# Patient Record
Sex: FEMALE | Race: WHITE | Employment: FULL TIME | URBAN - METROPOLITAN AREA
[De-identification: names, ages, dates, MRNs, and addresses within clinical notes are randomized per-mention and may not be internally consistent; named-entity substitution may affect disease eponyms.]

---

## 2019-03-21 ENCOUNTER — HOSPITAL ENCOUNTER (OUTPATIENT)
Age: 39
Setting detail: SPECIMEN
Discharge: HOME | End: 2019-03-21
Payer: COMMERCIAL

## 2019-03-21 LAB
ABSOLUTE EOS #: 0.41 K/UL (ref 0–0.44)
ABSOLUTE IMMATURE GRANULOCYTE: <0.03 K/UL (ref 0–0.3)
ABSOLUTE LYMPH #: 2.27 K/UL (ref 1.1–3.7)
ABSOLUTE MONO #: 0.56 K/UL (ref 0.1–1.2)
ALBUMIN SERPL-MCNC: 4.3 G/DL (ref 3.5–5.2)
ALBUMIN/GLOBULIN RATIO: 1.5 (ref 1–2.5)
ALP BLD-CCNC: 118 U/L (ref 35–104)
ALT SERPL-CCNC: 39 U/L (ref 5–33)
ANION GAP SERPL CALCULATED.3IONS-SCNC: 13 MMOL/L (ref 9–17)
AST SERPL-CCNC: 46 U/L
BASOPHILS # BLD: 1 % (ref 0–2)
BASOPHILS ABSOLUTE: 0.04 K/UL (ref 0–0.2)
BILIRUB SERPL-MCNC: 0.72 MG/DL (ref 0.3–1.2)
BUN BLDV-MCNC: 10 MG/DL (ref 6–20)
BUN/CREAT BLD: ABNORMAL (ref 9–20)
CALCIUM SERPL-MCNC: 9.3 MG/DL (ref 8.6–10.4)
CHLORIDE BLD-SCNC: 106 MMOL/L (ref 98–107)
CHOLESTEROL/HDL RATIO: 5
CHOLESTEROL: 164 MG/DL
CO2: 24 MMOL/L (ref 20–31)
CREAT SERPL-MCNC: 0.75 MG/DL (ref 0.5–0.9)
DIFFERENTIAL TYPE: ABNORMAL
EOSINOPHILS RELATIVE PERCENT: 6 % (ref 1–4)
FOLATE: 4.3 NG/ML
GFR AFRICAN AMERICAN: >60 ML/MIN
GFR NON-AFRICAN AMERICAN: >60 ML/MIN
GFR SERPL CREATININE-BSD FRML MDRD: ABNORMAL ML/MIN/{1.73_M2}
GFR SERPL CREATININE-BSD FRML MDRD: ABNORMAL ML/MIN/{1.73_M2}
GLUCOSE BLD-MCNC: 90 MG/DL (ref 70–99)
HCT VFR BLD CALC: 48.8 % (ref 36.3–47.1)
HDLC SERPL-MCNC: 33 MG/DL
HEMOGLOBIN: 15.9 G/DL (ref 11.9–15.1)
IMMATURE GRANULOCYTES: 0 %
LDL CHOLESTEROL: 92 MG/DL (ref 0–130)
LYMPHOCYTES # BLD: 34 % (ref 24–43)
MAGNESIUM: 2.3 MG/DL (ref 1.6–2.6)
MCH RBC QN AUTO: 28.9 PG (ref 25.2–33.5)
MCHC RBC AUTO-ENTMCNC: 32.6 G/DL (ref 28.4–34.8)
MCV RBC AUTO: 88.6 FL (ref 82.6–102.9)
MONOCYTES # BLD: 8 % (ref 3–12)
NRBC AUTOMATED: 0 PER 100 WBC
PDW BLD-RTO: 13.8 % (ref 11.8–14.4)
PLATELET # BLD: ABNORMAL K/UL (ref 138–453)
PLATELET ESTIMATE: ABNORMAL
PLATELET, FLUORESCENCE: 208 K/UL (ref 138–453)
PLATELET, IMMATURE FRACTION: 11.7 % (ref 1.1–10.3)
PMV BLD AUTO: ABNORMAL FL (ref 8.1–13.5)
POTASSIUM SERPL-SCNC: 4.6 MMOL/L (ref 3.7–5.3)
PREALBUMIN: 19 MG/DL (ref 20–40)
RBC # BLD: 5.51 M/UL (ref 3.95–5.11)
RBC # BLD: ABNORMAL 10*6/UL
SEG NEUTROPHILS: 51 % (ref 36–65)
SEGMENTED NEUTROPHILS ABSOLUTE COUNT: 3.4 K/UL (ref 1.5–8.1)
SODIUM BLD-SCNC: 143 MMOL/L (ref 135–144)
THYROXINE, FREE: 0.82 NG/DL (ref 0.93–1.7)
TOTAL PROTEIN: 7.2 G/DL (ref 6.4–8.3)
TRIGL SERPL-MCNC: 196 MG/DL
TSH SERPL DL<=0.05 MIU/L-ACNC: 15.13 MIU/L (ref 0.3–5)
VITAMIN B-12: 342 PG/ML (ref 232–1245)
VITAMIN D 25-HYDROXY: 10.5 NG/ML (ref 30–100)
VLDLC SERPL CALC-MCNC: ABNORMAL MG/DL (ref 1–30)
WBC # BLD: 6.7 K/UL (ref 3.5–11.3)
WBC # BLD: ABNORMAL 10*3/UL

## 2019-04-11 ENCOUNTER — HOSPITAL ENCOUNTER (OUTPATIENT)
Dept: ULTRASOUND IMAGING | Age: 39
Discharge: HOME | End: 2019-04-11
Payer: COMMERCIAL

## 2019-04-11 DIAGNOSIS — E03.9 HYPOTHYROIDISM, UNSPECIFIED TYPE: ICD-10-CM

## 2019-04-11 PROCEDURE — 76536 US EXAM OF HEAD AND NECK: CPT

## 2019-05-15 ENCOUNTER — TELEPHONE (OUTPATIENT)
Dept: BARIATRICS/WEIGHT MGMT | Age: 39
End: 2019-05-15

## 2019-05-15 NOTE — TELEPHONE ENCOUNTER
Pt has int visit with Dr. Iraj Dumont on 5/17/2019. She had gastric bypass surgery Nov. 15, 2018 In [place] Dr. Landon Mejía po visit was scheduled for [facility] [place]. 17. 2018- she had moved to [place] by then-no post op visits with bariatric doctor. She has been waiting to \"get insurance\" to have a visit with us. Is taking vitamins-will bring all meds to 5/1/19 visit.

## 2019-05-17 ENCOUNTER — OFFICE VISIT (OUTPATIENT)
Dept: BARIATRICS/WEIGHT MGMT | Age: 39
End: 2019-05-17
Payer: COMMERCIAL

## 2019-05-17 VITALS
SYSTOLIC BLOOD PRESSURE: 114 MMHG | HEIGHT: 63 IN | HEART RATE: 72 BPM | WEIGHT: 269.2 LBS | DIASTOLIC BLOOD PRESSURE: 70 MMHG | RESPIRATION RATE: 18 BRPM | TEMPERATURE: 98.2 F | BODY MASS INDEX: 47.7 KG/M2

## 2019-05-17 DIAGNOSIS — F41.9 ANXIETY: ICD-10-CM

## 2019-05-17 DIAGNOSIS — E66.01 MORBID OBESITY (HCC): Primary | ICD-10-CM

## 2019-05-17 DIAGNOSIS — E03.9 HYPOTHYROIDISM, UNSPECIFIED TYPE: ICD-10-CM

## 2019-05-17 DIAGNOSIS — K21.9 GASTROESOPHAGEAL REFLUX DISEASE, ESOPHAGITIS PRESENCE NOT SPECIFIED: ICD-10-CM

## 2019-05-17 DIAGNOSIS — Z98.84 STATUS POST GASTRIC BYPASS FOR OBESITY: ICD-10-CM

## 2019-05-17 DIAGNOSIS — Z13.21 SCREENING FOR MALNUTRITION: ICD-10-CM

## 2019-05-17 PROCEDURE — 99203 OFFICE O/P NEW LOW 30 MIN: CPT | Performed by: SURGERY

## 2019-05-17 PROCEDURE — 4004F PT TOBACCO SCREEN RCVD TLK: CPT | Performed by: SURGERY

## 2019-05-17 PROCEDURE — G8419 CALC BMI OUT NRM PARAM NOF/U: HCPCS | Performed by: SURGERY

## 2019-05-17 PROCEDURE — G8427 DOCREV CUR MEDS BY ELIG CLIN: HCPCS | Performed by: SURGERY

## 2019-05-17 RX ORDER — M-VIT,TX,IRON,MINS/CALC/FOLIC 27MG-0.4MG
1 TABLET ORAL DAILY
COMMUNITY
End: 2019-08-14 | Stop reason: ALTCHOICE

## 2019-05-17 RX ORDER — LEVOTHYROXINE SODIUM 0.15 MG/1
1 TABLET ORAL DAILY
Refills: 0 | COMMUNITY
Start: 2019-04-06 | End: 2019-08-14 | Stop reason: ALTCHOICE

## 2019-05-17 RX ORDER — ESTRADIOL 2 MG/1
1 TABLET ORAL DAILY
Refills: 6 | COMMUNITY
Start: 2019-04-06

## 2019-05-17 RX ORDER — IBUPROFEN 200 MG
1 CAPSULE ORAL DAILY
Status: ON HOLD | COMMUNITY
End: 2019-09-27 | Stop reason: HOSPADM

## 2019-05-17 RX ORDER — SPIRONOLACTONE 50 MG/1
1 TABLET, FILM COATED ORAL PRN
Refills: 6 | COMMUNITY
Start: 2019-04-08

## 2019-05-17 RX ORDER — KETOTIFEN FUMARATE 0.25 MG/ML
SOLUTION OPHTHALMIC PRN
Refills: 6 | Status: ON HOLD | COMMUNITY
Start: 2019-05-09 | End: 2020-10-31

## 2019-05-17 RX ORDER — POLYVINYL ALCOHOL 14 MG/ML
SOLUTION/ DROPS OPHTHALMIC PRN
Refills: 3 | Status: ON HOLD | COMMUNITY
Start: 2019-05-11 | End: 2020-10-31 | Stop reason: ALTCHOICE

## 2019-05-17 RX ORDER — FAMOTIDINE 20 MG/1
1 TABLET, FILM COATED ORAL DAILY
Refills: 6 | COMMUNITY
Start: 2019-04-06 | End: 2019-08-14 | Stop reason: ALTCHOICE

## 2019-05-17 RX ORDER — FUROSEMIDE 40 MG/1
1 TABLET ORAL PRN
Refills: 6 | COMMUNITY
Start: 2019-04-06

## 2019-05-17 RX ORDER — CHOLECALCIFEROL (VITAMIN D3) 125 MCG
1 CAPSULE ORAL DAILY
Refills: 6 | COMMUNITY
Start: 2019-04-06

## 2019-05-17 ASSESSMENT — ENCOUNTER SYMPTOMS
EYE PAIN: 0
CHEST TIGHTNESS: 0
SHORTNESS OF BREATH: 0
WHEEZING: 0
RECTAL PAIN: 0
ALLERGIC/IMMUNOLOGIC NEGATIVE: 1
RHINORRHEA: 0
COLOR CHANGE: 0
VOMITING: 0
DIARRHEA: 0
ABDOMINAL PAIN: 0
CHOKING: 0
FACIAL SWELLING: 0
STRIDOR: 0
BLOOD IN STOOL: 0
TROUBLE SWALLOWING: 0
COUGH: 0
CONSTIPATION: 0
ABDOMINAL DISTENTION: 0
SINUS PRESSURE: 0
EYE ITCHING: 1
EYE REDNESS: 0
NAUSEA: 0
SORE THROAT: 0
EYE DISCHARGE: 0
ANAL BLEEDING: 0
APNEA: 0
BACK PAIN: 0
PHOTOPHOBIA: 0
VOICE CHANGE: 0

## 2019-05-18 NOTE — PROGRESS NOTES
Subjective:      Patient ID: Moises Pepe is a 45 y.o. female. Chief Complaint   Patient presents with    Bariatric, Initial Visit     New patient ; previous bariatric surg in [place] ; also has thyroid nodule      HPI  Sandhya Mckeon is a 51-year-old female who presents for initial evaluation at the weight management program secondary to her morbid obesity. BMI 47. She underwent a laparoscopic Adam-en-Y gastric bypass in November 2018 in [place]. She then had to move to [place] in December. She states she never had a follow-up after surgery. She states starting her weight loss 30 she was 422 pounds. At surgery time she was 389 pounds. Currently 269 pounds. She admits currently taking a Flintstones vitamin, vitamin B-12 and a multivitamin daily. She is taking calcium twice a day. She brought her medications with her. She states she has always had problems with her thyroid. She has had hypothyroidism for a long time and has known multinodular goiter. She has symptoms with heaviness and pressure on her neck because of it. Notices the thyroid enlargement in the mere and with swallowing. Currently taking Synthroid. Had been discussing with physicians in [place] about radioactive iodine treatment versus surgical intervention with total thyroidectomy. She states her overall weight loss goal is to weigh around 150 pounds. She denies having any issues with food intake other than a eggs and bread. She has been tolerating most foods and staying well-hydrated. Admits she would like to become more active and join a gym. She states she used to smoke 2 packs per day but is now down to half a pack a day. Occasionally does state being also. Denies current chest or abdominal pain. No hematochezia or melena. No new urinary complaints. She denies having any kind of laboratory work done since surgery. Review of Systems   Constitutional: Positive for appetite change and chills.  Negative for activity change, diaphoresis, fatigue, fever and unexpected weight change. HENT: Negative for congestion, dental problem, drooling, ear discharge, ear pain, facial swelling, hearing loss, mouth sores, nosebleeds, postnasal drip, rhinorrhea, sinus pressure, sneezing, sore throat, tinnitus, trouble swallowing and voice change. Eyes: Positive for itching. Negative for photophobia, pain, discharge, redness and visual disturbance. Respiratory: Negative for apnea, cough, choking, chest tightness, shortness of breath, wheezing and stridor. Cardiovascular: Positive for leg swelling. Negative for chest pain and palpitations. Gastrointestinal: Negative for abdominal distention, abdominal pain, anal bleeding, blood in stool, constipation, diarrhea, nausea, rectal pain and vomiting. Endocrine: Positive for heat intolerance. Negative for cold intolerance, polydipsia, polyphagia and polyuria. Genitourinary: Negative for decreased urine volume, difficulty urinating, dyspareunia, dysuria, enuresis, flank pain, frequency, genital sores, hematuria, menstrual problem, pelvic pain, urgency, vaginal bleeding, vaginal discharge and vaginal pain. Musculoskeletal: Negative for arthralgias, back pain, gait problem, joint swelling, myalgias, neck pain and neck stiffness. Skin: Negative for color change, pallor, rash and wound. Allergic/Immunologic: Negative. Neurological: Negative for dizziness, tremors, seizures, syncope, facial asymmetry, speech difficulty, weakness, light-headedness, numbness and headaches. Hematological: Negative for adenopathy. Does not bruise/bleed easily. Psychiatric/Behavioral: Negative for agitation, behavioral problems, confusion, decreased concentration, dysphoric mood, hallucinations, self-injury, sleep disturbance and suicidal ideas. The patient is nervous/anxious and is hyperactive.         Past Medical History:   Diagnosis Date    Anxiety     GERD (gastroesophageal reflux disease)     Hypothyroidism     Thyroid nodule        Past Surgical History:   Procedure Laterality Date    BARIATRIC SURGERY       SECTION      CHOLECYSTECTOMY      HYSTERECTOMY      TUBAL LIGATION         Current Outpatient Medications   Medication Sig Dispense Refill    vitamin B-12 (CYANOCOBALAMIN) 500 MCG tablet Take 1 tablet by mouth daily  6    levothyroxine (SYNTHROID) 150 MCG tablet Take 1 tablet by mouth daily  0    famotidine (PEPCID) 20 MG tablet Take 1 tablet by mouth daily  6    spironolactone (ALDACTONE) 50 MG tablet Take 1 tablet by mouth daily  6    EQ EYE ITCH RELIEF 0.025 % ophthalmic solution as needed  6    furosemide (LASIX) 40 MG tablet Take 1 tablet by mouth daily  6    ARTIFICIAL TEARS 1.4 % ophthalmic solution as needed  3    estradiol (ESTRACE) 2 MG tablet Take 1 tablet by mouth daily  6    calcium citrate (CALCITRATE) 950 MG tablet Take 1 tablet by mouth daily      Multiple Vitamins-Minerals (THERAPEUTIC MULTIVITAMIN-MINERALS) tablet Take 1 tablet by mouth daily Indications: centrium      Multiple Vitamin (MULTI VITAMIN DAILY PO) Take by mouth Indications: flinstones      White Petrolatum-Mineral Oil ([employer] EYE LUBRICANT NIGHTTIME) OINT Apply to eye       No current facility-administered medications for this visit.         Allergies   Allergen Reactions    Latex Itching and Rash       Family History   Problem Relation Age of Onset    Other Mother         gallbladder disease    Diabetes Father     Hypertension Father     Arthritis Father     Diabetes Paternal Grandfather     Cancer Paternal Grandmother     Diabetes Maternal Grandmother     Obesity Maternal Grandmother     Diabetes Maternal Grandfather        Social History     Socioeconomic History    Marital status:      Spouse name: Not on file    Number of children: Not on file    Years of education: Not on file    Highest education level: Not on file   Occupational History    Not on file   Social Needs  Financial resource strain: Not on file   Rj-Haja insecurity:     Worry: Not on file     Inability: Not on file    Transportation needs:     Medical: Not on file     Non-medical: Not on file   Tobacco Use    Smoking status: Current Every Day Smoker     Packs/day: 0.50    Smokeless tobacco: Never Used   Substance and Sexual Activity    Alcohol use: Yes     Comment: social holidays     Drug use: Never    Sexual activity: Not on file   Lifestyle    Physical activity:     Days per week: Not on file     Minutes per session: Not on file    Stress: Not on file   Relationships    Social connections:     Talks on phone: Not on file     Gets together: Not on file     Attends [employer] service: Not on file     Active member of club or organization: Not on file     Attends meetings of clubs or organizations: Not on file     Relationship status: Not on file    Intimate partner violence:     Fear of current or ex partner: Not on file     Emotionally abused: Not on file     Physically abused: Not on file     Forced sexual activity: Not on file   Other Topics Concern    Not on file   Social History Narrative    Not on file     Vitals:    05/17/19 1215   BP: 114/70   Pulse: 72   Resp: 18   Temp: 98.2 °F (36.8 °C)     Body mass index is 47.69 kg/m². Weight: 269 lb 3.2 oz (122.1 kg)     Objective:   Physical Exam   Constitutional: She is oriented to person, place, and time. She appears well-developed and well-nourished. No distress. HENT:   Head: Normocephalic and atraumatic. Right Ear: External ear normal.   Left Ear: External ear normal.   Nose: Nose normal.   Mouth/Throat: Oropharynx is clear and moist.   Eyes: Conjunctivae and EOM are normal. Right eye exhibits no discharge. Left eye exhibits no discharge. No scleral icterus. Neck: Normal range of motion. Neck supple. Cardiovascular: Normal rate, regular rhythm, normal heart sounds and intact distal pulses.    Pulmonary/Chest: Effort normal and breath fitness/exercise discussed with patient - encouraged fitness Center. 6.  Encourage smoking cessation. Discussed with patient possible risks and side effects of smoking with recent surgery. 7.  Follow-up in one month at weight management program at [address] [place] [address]. 8.  Signs and symptoms reviewed with patient that would be concerning and need her to return to office for re-evaluation. Patient states she will call if she has questions or concerns. 9.  Follow-up with general surgery office in regards to further discussions and care for thyroid disease. Recommended endocrinology consultation/evaluation as well. 10.  Psychology evaluation as needed  11. EGD as needed  12. Encouraged support groups  13. Discussed the pros and cons along with possible side effects/complications of weight loss medications. All questions answered. More than 30 minutes spent with patient today. Greater than 50% of the time was involved counseling, educaton and coordinating care.         Lynna Claude, MD

## 2019-05-22 ENCOUNTER — TELEPHONE (OUTPATIENT)
Dept: BARIATRICS/WEIGHT MGMT | Age: 39
End: 2019-05-22

## 2019-05-22 ENCOUNTER — HOSPITAL ENCOUNTER (OUTPATIENT)
Age: 39
Discharge: HOME | End: 2019-05-22
Payer: COMMERCIAL

## 2019-05-22 DIAGNOSIS — Z98.84 STATUS POST GASTRIC BYPASS FOR OBESITY: ICD-10-CM

## 2019-05-22 DIAGNOSIS — Z13.21 SCREENING FOR MALNUTRITION: ICD-10-CM

## 2019-05-22 LAB
ALBUMIN SERPL-MCNC: 4 G/DL (ref 3.5–5.1)
ALP BLD-CCNC: 134 U/L (ref 38–126)
ALT SERPL-CCNC: 19 U/L (ref 11–66)
ANION GAP SERPL CALCULATED.3IONS-SCNC: 16 MEQ/L (ref 8–16)
AST SERPL-CCNC: 26 U/L (ref 5–40)
AVERAGE GLUCOSE: 93 MG/DL (ref 70–126)
BASOPHILS # BLD: 0.5 %
BASOPHILS ABSOLUTE: 0 THOU/MM3 (ref 0–0.1)
BILIRUB SERPL-MCNC: 0.6 MG/DL (ref 0.3–1.2)
BUN BLDV-MCNC: 15 MG/DL (ref 7–22)
CALCIUM SERPL-MCNC: 9.6 MG/DL (ref 8.5–10.5)
CHLORIDE BLD-SCNC: 104 MEQ/L (ref 98–111)
CO2: 22 MEQ/L (ref 23–33)
CREAT SERPL-MCNC: 0.8 MG/DL (ref 0.4–1.2)
EOSINOPHIL # BLD: 5.5 %
EOSINOPHILS ABSOLUTE: 0.4 THOU/MM3 (ref 0–0.4)
ERYTHROCYTE [DISTWIDTH] IN BLOOD BY AUTOMATED COUNT: 13.1 % (ref 11.5–14.5)
ERYTHROCYTE [DISTWIDTH] IN BLOOD BY AUTOMATED COUNT: 42.6 FL (ref 35–45)
FERRITIN: 110 NG/ML (ref 10–291)
FOLATE: 5.7 NG/ML (ref 4.8–24.2)
GFR SERPL CREATININE-BSD FRML MDRD: 80 ML/MIN/1.73M2
GLUCOSE BLD-MCNC: 88 MG/DL (ref 70–108)
HBA1C MFR BLD: 5.1 % (ref 4.4–6.4)
HCT VFR BLD CALC: 48.9 % (ref 37–47)
HEMOGLOBIN: 16.1 GM/DL (ref 12–16)
IMMATURE GRANS (ABS): 0.02 THOU/MM3 (ref 0–0.07)
IMMATURE GRANULOCYTES: 0.3 %
IRON: 109 UG/DL (ref 50–170)
LYMPHOCYTES # BLD: 39.3 %
LYMPHOCYTES ABSOLUTE: 3.1 THOU/MM3 (ref 1–4.8)
MCH RBC QN AUTO: 29.3 PG (ref 26–33)
MCHC RBC AUTO-ENTMCNC: 32.9 GM/DL (ref 32.2–35.5)
MCV RBC AUTO: 89.1 FL (ref 81–99)
MONOCYTES # BLD: 7.7 %
MONOCYTES ABSOLUTE: 0.6 THOU/MM3 (ref 0.4–1.3)
NUCLEATED RED BLOOD CELLS: 0 /100 WBC
PLATELET # BLD: 263 THOU/MM3 (ref 130–400)
PMV BLD AUTO: 12 FL (ref 9.4–12.4)
POTASSIUM SERPL-SCNC: 4.3 MEQ/L (ref 3.5–5.2)
PREALBUMIN: 17.2 MG/DL (ref 20–40)
PTH INTACT: 57.9 PG/ML (ref 15–65)
RBC # BLD: 5.49 MILL/MM3 (ref 4.2–5.4)
SEG NEUTROPHILS: 46.7 %
SEGMENTED NEUTROPHILS ABSOLUTE COUNT: 3.6 THOU/MM3 (ref 1.8–7.7)
SODIUM BLD-SCNC: 142 MEQ/L (ref 135–145)
T4 FREE: 0.98 NG/DL (ref 0.93–1.76)
TOTAL IRON BINDING CAPACITY: 334 UG/DL (ref 171–450)
TOTAL PROTEIN: 7.9 G/DL (ref 6.1–8)
TSH SERPL DL<=0.05 MIU/L-ACNC: 14.28 UIU/ML (ref 0.4–4.2)
VITAMIN B-12: 366 PG/ML (ref 211–911)
VITAMIN D 25-HYDROXY: 12 NG/ML (ref 30–100)
WBC # BLD: 7.8 THOU/MM3 (ref 4.8–10.8)

## 2019-05-22 PROCEDURE — 84255 ASSAY OF SELENIUM: CPT

## 2019-05-22 PROCEDURE — 84630 ASSAY OF ZINC: CPT

## 2019-05-22 PROCEDURE — 80053 COMPREHEN METABOLIC PANEL: CPT

## 2019-05-22 PROCEDURE — 83540 ASSAY OF IRON: CPT

## 2019-05-22 PROCEDURE — 83036 HEMOGLOBIN GLYCOSYLATED A1C: CPT

## 2019-05-22 PROCEDURE — 36415 COLL VENOUS BLD VENIPUNCTURE: CPT

## 2019-05-22 PROCEDURE — 84439 ASSAY OF FREE THYROXINE: CPT

## 2019-05-22 PROCEDURE — 85025 COMPLETE CBC W/AUTO DIFF WBC: CPT

## 2019-05-22 PROCEDURE — 84134 ASSAY OF PREALBUMIN: CPT

## 2019-05-22 PROCEDURE — 84443 ASSAY THYROID STIM HORMONE: CPT

## 2019-05-22 PROCEDURE — 82728 ASSAY OF FERRITIN: CPT

## 2019-05-22 PROCEDURE — 83970 ASSAY OF PARATHORMONE: CPT

## 2019-05-22 PROCEDURE — 84590 ASSAY OF VITAMIN A: CPT

## 2019-05-22 PROCEDURE — 84425 ASSAY OF VITAMIN B-1: CPT

## 2019-05-22 PROCEDURE — 82306 VITAMIN D 25 HYDROXY: CPT

## 2019-05-22 PROCEDURE — 82607 VITAMIN B-12: CPT

## 2019-05-22 PROCEDURE — 83550 IRON BINDING TEST: CPT

## 2019-05-22 PROCEDURE — 82525 ASSAY OF COPPER: CPT

## 2019-05-22 PROCEDURE — 82746 ASSAY OF FOLIC ACID SERUM: CPT

## 2019-05-23 ENCOUNTER — OFFICE VISIT (OUTPATIENT)
Dept: BARIATRICS/WEIGHT MGMT | Age: 39
End: 2019-05-23

## 2019-05-23 DIAGNOSIS — Z98.84 STATUS POST BARIATRIC SURGERY: Primary | ICD-10-CM

## 2019-05-23 DIAGNOSIS — E55.9 VITAMIN D DEFICIENCY: Primary | ICD-10-CM

## 2019-05-23 RX ORDER — CHOLECALCIFEROL (VITAMIN D3) 1250 MCG
1 CAPSULE ORAL WEEKLY
Qty: 12 CAPSULE | Refills: 0 | Status: ON HOLD | OUTPATIENT
Start: 2019-05-23 | End: 2019-09-27 | Stop reason: HOSPADM

## 2019-05-23 NOTE — PROGRESS NOTES
Patient is a 45 y.o. female seen for initial MNT visit for six month post op bypass. Pt requesting RD visit as no follow up care since gastric bypass six months ago. Vitals from current and previous visits:  Vitals 6/67/9995   SYSTOLIC 005   DIASTOLIC 70   Site Right Lower Arm   Position Sitting   Cuff Size Large Adult   Pulse 72   Temp 98.2   Resp 18   Weight 269 lb 3.2 oz   Height 5' 3\"   BMI (wt*703/ht~2) 47.68 kg/m2        Recent Post Op Lab Work:    Some lab work pending that was done yesterday. Await copper, selenium, zinc, and B1 lab levels to return. Lab Results   Component Value Date    VITD25 12 05/22/2019       Date of Surgery: November 2018   Type of Surgery: gastric bypass in [place], [place]. Started with program June 2018 weight was 422 lbs. States after high school weight was ~ 189 lbs     Obesity Classification: Class III    Initial Weight: 389 lbs at pre-op teaching class   Excess  Body Weight Pre-Op: 248  lbs    Weight Loss: 120 lbs. Patient's Target Weight =  141 lbs for a BMI of ~25  Percentage of Excess Body Weight Lost to date is : 48 %    How pleased are you with your current weight loss: Pt is pleased with weight loss    Are you experiencing any physical problems post surgery: No: - States bowels move ok    Are you experiencing any dietary problems post surgery: Yes- heart burn with tomato products. Reports will get n/v if eats too fast- this happens less than once a week  Food Intolerances: Is not tolerating  eggs and bread that isn't toasted, hotdogs. How frequently are you eating? Has difficulty eating breakfast, will eat lunch and dinner. May snack on a cracker in the morning. In afternoon may have some popcorn  How long does it take you to finish a meal? Pt taking time to eat  How full do you feel after eating?  Pt feels full after eats      Protein intake: <60 grams/day without regular protein supplementation or drinking milk  Patient taking protein supplement: No.  Brand of Supplement:     Fluid intake: Drinks Unsweet Tea and 24 oz water four times a day. Multivitamin/mineral intake: Two Centrum  Adult MVI Tablets a day = 2,000IU's D3, 3 mg Thiamin, 36 mg iron, zinc 22 mg, copper- 1.0 mg, selenium - 110 mcg  Also taking One Lyman Chewable Complete- 1.5 mg Thiamin, Vitamin D-600 IU, Zinc- 12 mg, Copper- 2mg    Calcium intake: Yes. Citracal two per day = 630 mg /day and 500 IU's Vitamin D3. Advised pt to increase the Citracal to two tablets twice a day = 1260 mg Calcium per day to meet ASMBS guidelines. Other: B12- 500 micrograms daily  Pepcid 20 mg once a day    Does patient exercise: no exercise  Is planning to join [employer]. Assessment  Suspect inadequate protein intake without routine protein supplementation and skipping breakfast.  Protein goal is 60-80 grams daily. Taking vitamins to prevent deficiencies. Adequate water intake. Plan  Plan/Recommendations: Will have PA order Vitamin D3 50,000IU's weekly times twelve weeks. Increase Calcium to two tablets twice a day. Continue current MVI's and B12. Will adjust these vitamin recommendations if needed pending rest of lab work results  Educational handout reviewed with patient re: protein source foods and grams. Additional instructions below. 1. Continue bariatric vitamins as you are currently taking. Can increase your CitraCal to two tablets twice a day. 2. Goal continues to be 60-80 grams protein per day. Focus on choosing protein foods first at meals to remain full and maintain muscle mass while you continue to lose from body fat stores. 3. Regular physical activity is important if desire to continue weight loss efforts. Recommend regular cardiac activity and strength training 2-3 days per week. 4.  Water goal is 64 oz per day and make sure no liquids 30 minutes before meals and no liquids 30 minutes after meals  5.   Take 20-30 minutes to eat meals and chew all foods well for best tolerance    Follow up with your family doctor about your Thyroid level and medication. Your TSH level was 14.2  T4-0.98      Recommended Follow-Up: 9 month post op with RD and $25 co-pay. Order one year post gastric bypass lab work at 10 month post op visit.     Brandt Villanueva RD, LD  Dietitian- [facility]

## 2019-05-23 NOTE — PATIENT INSTRUCTIONS
1. Continue bariatric vitamins as you are currently taking. Can increase your CitraCal to two tablets twice a day. 2. Goal continues to be 60-80 grams protein per day. Focus on choosing protein foods first at meals to remain full and maintain muscle mass while you continue to lose from body fat stores. 3. Regular physical activity is important if desire to continue weight loss efforts. Recommend regular cardiac activity and strength training 2-3 days per week. 4.  Water goal is 64 oz per day and make sure no liquids 30 minutes before meals and no liquids 30 minutes after meals  5. Take 20-30 minutes to eat meals and chew all foods well for best tolerance    Follow up with your family doctor about your Thyroid level and medication.   Your TSH level was 14.2  T4-0.98

## 2019-05-25 LAB
RETINOL (VITAMIN A): NORMAL
SELENIUM: 130 UG/L (ref 23–190)
VITAMIN B1 WHOLE BLOOD: 90 NMOL/L (ref 70–180)
ZINC: 69.4 UG/DL (ref 60–120)

## 2019-05-26 LAB — COPPER: 193.1 UG/DL (ref 80–155)

## 2019-05-27 ENCOUNTER — HOSPITAL ENCOUNTER (EMERGENCY)
Age: 39
Discharge: HOME | End: 2019-05-27
Attending: FAMILY MEDICINE
Payer: COMMERCIAL

## 2019-05-27 ENCOUNTER — APPOINTMENT (OUTPATIENT)
Dept: GENERAL RADIOLOGY | Age: 39
End: 2019-05-27
Payer: COMMERCIAL

## 2019-05-27 VITALS
DIASTOLIC BLOOD PRESSURE: 86 MMHG | RESPIRATION RATE: 18 BRPM | OXYGEN SATURATION: 97 % | BODY MASS INDEX: 46.95 KG/M2 | HEART RATE: 84 BPM | HEIGHT: 63 IN | SYSTOLIC BLOOD PRESSURE: 121 MMHG | WEIGHT: 265 LBS | TEMPERATURE: 97.6 F

## 2019-05-27 DIAGNOSIS — K59.00 CONSTIPATION, UNSPECIFIED CONSTIPATION TYPE: Primary | ICD-10-CM

## 2019-05-27 PROCEDURE — 99283 EMERGENCY DEPT VISIT LOW MDM: CPT

## 2019-05-27 PROCEDURE — 74018 RADEX ABDOMEN 1 VIEW: CPT

## 2019-05-27 PROCEDURE — 2709999900 HC NON-CHARGEABLE SUPPLY

## 2019-05-27 RX ORDER — POLYETHYLENE GLYCOL 3350 17 G/17G
17 POWDER, FOR SOLUTION ORAL DAILY
Qty: 1 BOTTLE | Refills: 0 | Status: SHIPPED | OUTPATIENT
Start: 2019-05-27 | End: 2019-06-03

## 2019-05-27 ASSESSMENT — ENCOUNTER SYMPTOMS
DIARRHEA: 0
SHORTNESS OF BREATH: 0
EYE PAIN: 0
ABDOMINAL PAIN: 1
EYE DISCHARGE: 0
BACK PAIN: 0
CONSTIPATION: 1
RHINORRHEA: 0
VOMITING: 0
SORE THROAT: 0
WHEEZING: 0
COUGH: 0
NAUSEA: 0

## 2019-05-27 ASSESSMENT — PAIN DESCRIPTION - ORIENTATION: ORIENTATION: LOWER

## 2019-05-27 ASSESSMENT — PAIN DESCRIPTION - LOCATION: LOCATION: ABDOMEN

## 2019-05-27 ASSESSMENT — PAIN DESCRIPTION - DESCRIPTORS: DESCRIPTORS: TIGHTNESS

## 2019-05-27 ASSESSMENT — PAIN DESCRIPTION - PAIN TYPE: TYPE: CHRONIC PAIN

## 2019-05-27 ASSESSMENT — PAIN SCALES - GENERAL: PAINLEVEL_OUTOF10: 8

## 2019-05-28 NOTE — ED PROVIDER NOTES
[facility]  eMERGENCY dEPARTMENT eNCOUnter          CHIEF COMPLAINT       Chief Complaint   Patient presents with    Constipation    Fecal Impaction       Nurses Notes reviewed and I agree except as noted in the HPI. HISTORY OF PRESENT ILLNESS    Raúl Pacheco is a 45 y.o. female who presents to the Emergency Department for the evaluation of constipation. The patient reports that she has not had a normal bowel movement in about 1 month. She states she woke up this morning with abdominal pain due to the constipation. Patient tried taking a stool softener and laxatives today, but these provided no relief. The patient explains that she is able to feel stool in the rectum but is not able to push it out. Patient reports having bariatric surgery in November and states that it may be her diet since the surgery that is causing the constipation. There are no other complaints at this time. She has no vomiting. No abdominal distention. No melena or hematochezia. No hematemesis. She denies any  symptoms at this time. She has no cardiac respiratory system symptoms. No headache neck pain or back pain. No extremity or musculoskeletal issues. No fevers or any other constitutional symptoms. The HPI was provided by the patient. REVIEW OF SYSTEMS     Review of Systems   Constitutional: Negative for appetite change, chills, fatigue and fever. HENT: Negative for congestion, ear pain, rhinorrhea and sore throat. Eyes: Negative for pain, discharge and visual disturbance. Respiratory: Negative for cough, shortness of breath and wheezing. Cardiovascular: Negative for chest pain, palpitations and leg swelling. Gastrointestinal: Positive for abdominal pain and constipation. Negative for diarrhea, nausea and vomiting. Genitourinary: Negative for difficulty urinating, dysuria, hematuria and vaginal discharge.    Musculoskeletal: Negative for arthralgias, back pain, joint swelling indicated that her maternal grandfather is . She indicated that her paternal grandmother is alive. She indicated that her paternal grandfather is . family history includes Arthritis in her father; Cancer in her paternal grandmother; Diabetes in her father, maternal grandfather, maternal grandmother, and paternal grandfather; Hypertension in her father; Obesity in her maternal grandmother; Other in her mother. SOCIAL HISTORY      reports that she has been smoking. She has been smoking about 0.50 packs per day. She has never used smokeless tobacco. She reports that she drinks alcohol. She reports that she does not use drugs. PHYSICAL EXAM     INITIAL VITALS:  height is 5' 3\" (1.6 m) and weight is 265 lb (120.2 kg). Her oral temperature is 97.6 °F (36.4 °C). Her blood pressure is 121/86 and her pulse is 84. Her respiration is 18 and oxygen saturation is 97%. Physical Exam   Constitutional: She is oriented to person, place, and time. She appears well-developed and well-nourished. Non-toxic appearance. HENT:   Head: Normocephalic and atraumatic. Right Ear: Tympanic membrane and external ear normal.   Left Ear: Tympanic membrane and external ear normal.   Nose: Nose normal.   Mouth/Throat: Oropharynx is clear and moist and mucous membranes are normal. No oropharyngeal exudate, posterior oropharyngeal edema or posterior oropharyngeal erythema. Eyes: Conjunctivae and EOM are normal.   Neck: Normal range of motion. Neck supple. No JVD present. Cardiovascular: Normal rate, regular rhythm, normal heart sounds, intact distal pulses and normal pulses. Exam reveals no gallop and no friction rub. No murmur heard. Pulmonary/Chest: Effort normal and breath sounds normal. No respiratory distress. She has no decreased breath sounds. She has no wheezes. She has no rhonchi. She has no rales. Abdominal: Soft. Bowel sounds are normal. She exhibits no distension. There is no tenderness.  There is no rebound, no guarding and no CVA tenderness. Genitourinary:   Genitourinary Comments: Soft stool in the rectum. Musculoskeletal: Normal range of motion. She exhibits no edema. Neurological: She is alert and oriented to person, place, and time. She exhibits normal muscle tone. Coordination normal.   Skin: Skin is warm and dry. No rash noted. She is not diaphoretic. Nursing note and vitals reviewed. DIFFERENTIAL DIAGNOSIS:   constipation    DIAGNOSTIC RESULTS     EKG: All EKG's are interpreted by the Emergency Department Physician who either signs or Co-signs this chart in the absence of a cardiologist.  EKG interpreted by Heidi Valdez MD:    None     RADIOLOGY: non-plain film images(s) such as CT, Ultrasound and MRI are read by the radiologist.    XR ABDOMEN (KUB) (SINGLE AP VIEW)   Final Result      Non obstructive bowel gas pattern. No significant stool retention. **This report has been created using voice recognition software. It may contain minor errors which are inherent in voice recognition technology. **      Final report electronically signed by Dr. Jasmyne Sanford on 5/27/2019 9:21 PM           LABS:   Labs Reviewed - No data to display    EMERGENCY DEPARTMENT COURSE:   Vitals:    Vitals:    05/27/19 2008 05/27/19 2010   BP:  121/86   Pulse: 84    Resp: 18    Temp: 97.6 °F (36.4 °C)    TempSrc: Oral    SpO2: 97%    Weight: 265 lb (120.2 kg)    Height: 5' 3\" (1.6 m)          MDM:    Patient presents to the emergency department constipation KUB confirms severe constipation she is clinically well with a normal exam.  An enema  has been mildly to moderately successful. She can be discharged with MiraLAX for outpatient treatment. I asked her to come back to the ED if she is not any better in the next 48-72 hours of miralax. Or if she gets worse at any point she is agreeable to the plan and discharged. X-rays show no significant amounts of retention with normal bowel gas pattern.

## 2019-05-28 NOTE — ED TRIAGE NOTES
Pt to ED with c/o constipation and fecal impaction X 1 month. Pt stated she feels bowel in rectum but unable to push it out. Pt stated she has taken laxatives and stool softeners it has only moved small bowels.

## 2019-06-11 ENCOUNTER — HOSPITAL ENCOUNTER (OUTPATIENT)
Age: 39
Setting detail: SPECIMEN
Discharge: HOME | End: 2019-06-11
Payer: COMMERCIAL

## 2019-06-13 LAB — CYTOLOGY REPORT: NORMAL

## 2019-07-09 ENCOUNTER — TELEPHONE (OUTPATIENT)
Dept: SURGERY | Age: 39
End: 2019-07-09

## 2019-07-22 ENCOUNTER — HOSPITAL ENCOUNTER (EMERGENCY)
Age: 39
Discharge: HOME | End: 2019-07-22
Payer: COMMERCIAL

## 2019-07-22 VITALS
OXYGEN SATURATION: 97 % | SYSTOLIC BLOOD PRESSURE: 122 MMHG | BODY MASS INDEX: 45 KG/M2 | HEART RATE: 74 BPM | RESPIRATION RATE: 16 BRPM | HEIGHT: 63 IN | DIASTOLIC BLOOD PRESSURE: 74 MMHG | WEIGHT: 254 LBS | TEMPERATURE: 97.7 F

## 2019-07-22 DIAGNOSIS — K29.00 ACUTE GASTRITIS WITHOUT HEMORRHAGE, UNSPECIFIED GASTRITIS TYPE: Primary | ICD-10-CM

## 2019-07-22 LAB
ALBUMIN SERPL-MCNC: 4.1 G/DL (ref 3.5–5.1)
ALP BLD-CCNC: 107 U/L (ref 38–126)
ALT SERPL-CCNC: 14 U/L (ref 11–66)
AMPHETAMINE+METHAMPHETAMINE URINE SCREEN: NEGATIVE
ANION GAP SERPL CALCULATED.3IONS-SCNC: 14 MEQ/L (ref 8–16)
AST SERPL-CCNC: 19 U/L (ref 5–40)
BARBITURATE QUANTITATIVE URINE: NEGATIVE
BASOPHILS # BLD: 0.6 %
BASOPHILS ABSOLUTE: 0 THOU/MM3 (ref 0–0.1)
BENZODIAZEPINE QUANTITATIVE URINE: NEGATIVE
BILIRUB SERPL-MCNC: 0.5 MG/DL (ref 0.3–1.2)
BILIRUBIN DIRECT: < 0.2 MG/DL (ref 0–0.3)
BILIRUBIN URINE: ABNORMAL
BLOOD, URINE: NEGATIVE
BUN BLDV-MCNC: 16 MG/DL (ref 7–22)
CALCIUM SERPL-MCNC: 9.3 MG/DL (ref 8.5–10.5)
CANNABINOID QUANTITATIVE URINE: NEGATIVE
CHARACTER, URINE: CLEAR
CHLORIDE BLD-SCNC: 106 MEQ/L (ref 98–111)
CO2: 22 MEQ/L (ref 23–33)
COCAINE METABOLITE QUANTITATIVE URINE: NEGATIVE
COLOR: ABNORMAL
CREAT SERPL-MCNC: 0.7 MG/DL (ref 0.4–1.2)
EOSINOPHIL # BLD: 5.4 %
EOSINOPHILS ABSOLUTE: 0.4 THOU/MM3 (ref 0–0.4)
ERYTHROCYTE [DISTWIDTH] IN BLOOD BY AUTOMATED COUNT: 13.1 % (ref 11.5–14.5)
ERYTHROCYTE [DISTWIDTH] IN BLOOD BY AUTOMATED COUNT: 42.8 FL (ref 35–45)
GFR SERPL CREATININE-BSD FRML MDRD: > 90 ML/MIN/1.73M2
GLUCOSE BLD-MCNC: 94 MG/DL (ref 70–108)
GLUCOSE URINE: NEGATIVE MG/DL
HCT VFR BLD CALC: 46 % (ref 37–47)
HEMOGLOBIN: 15.1 GM/DL (ref 12–16)
ICTOTEST: NEGATIVE
IMMATURE GRANS (ABS): 0.02 THOU/MM3 (ref 0–0.07)
IMMATURE GRANULOCYTES: 0 %
KETONES, URINE: NEGATIVE
LEUKOCYTE ESTERASE, URINE: NEGATIVE
LIPASE: 27.5 U/L (ref 5.6–51.3)
LYMPHOCYTES # BLD: 45.7 %
LYMPHOCYTES ABSOLUTE: 3.1 THOU/MM3 (ref 1–4.8)
MAGNESIUM: 2.2 MG/DL (ref 1.6–2.4)
MCH RBC QN AUTO: 29.3 PG (ref 26–33)
MCHC RBC AUTO-ENTMCNC: 32.8 GM/DL (ref 32.2–35.5)
MCV RBC AUTO: 89.3 FL (ref 81–99)
MONOCYTES # BLD: 6.9 %
MONOCYTES ABSOLUTE: 0.5 THOU/MM3 (ref 0.4–1.3)
NITRITE, URINE: NEGATIVE
NUCLEATED RED BLOOD CELLS: 0 /100 WBC
OPIATES, URINE: NEGATIVE
OSMOLALITY CALCULATION: 284.1 MOSMOL/KG (ref 275–300)
OXYCODONE: NEGATIVE
PH UA: 5.5 (ref 5–9)
PHENCYCLIDINE QUANTITATIVE URINE: NEGATIVE
PLATELET # BLD: 212 THOU/MM3 (ref 130–400)
PMV BLD AUTO: 12 FL (ref 9.4–12.4)
POTASSIUM SERPL-SCNC: 4 MEQ/L (ref 3.5–5.2)
PROTEIN UA: NEGATIVE
RBC # BLD: 5.15 MILL/MM3 (ref 4.2–5.4)
SEG NEUTROPHILS: 41.1 %
SEGMENTED NEUTROPHILS ABSOLUTE COUNT: 2.8 THOU/MM3 (ref 1.8–7.7)
SODIUM BLD-SCNC: 142 MEQ/L (ref 135–145)
SPECIFIC GRAVITY, URINE: >= 1.03 (ref 1–1.03)
TOTAL PROTEIN: 7.5 G/DL (ref 6.1–8)
UROBILINOGEN, URINE: 1 EU/DL (ref 0–1)
WBC # BLD: 6.7 THOU/MM3 (ref 4.8–10.8)

## 2019-07-22 PROCEDURE — 83735 ASSAY OF MAGNESIUM: CPT

## 2019-07-22 PROCEDURE — 81003 URINALYSIS AUTO W/O SCOPE: CPT

## 2019-07-22 PROCEDURE — 96374 THER/PROPH/DIAG INJ IV PUSH: CPT

## 2019-07-22 PROCEDURE — 80307 DRUG TEST PRSMV CHEM ANLYZR: CPT

## 2019-07-22 PROCEDURE — 36415 COLL VENOUS BLD VENIPUNCTURE: CPT

## 2019-07-22 PROCEDURE — 83690 ASSAY OF LIPASE: CPT

## 2019-07-22 PROCEDURE — 6360000002 HC RX W HCPCS: Performed by: PHYSICIAN ASSISTANT

## 2019-07-22 PROCEDURE — 99284 EMERGENCY DEPT VISIT MOD MDM: CPT

## 2019-07-22 PROCEDURE — 2580000003 HC RX 258: Performed by: PHYSICIAN ASSISTANT

## 2019-07-22 PROCEDURE — 80053 COMPREHEN METABOLIC PANEL: CPT

## 2019-07-22 PROCEDURE — 85025 COMPLETE CBC W/AUTO DIFF WBC: CPT

## 2019-07-22 PROCEDURE — 82248 BILIRUBIN DIRECT: CPT

## 2019-07-22 RX ORDER — ONDANSETRON 4 MG/1
4 TABLET, ORALLY DISINTEGRATING ORAL EVERY 8 HOURS PRN
Qty: 20 TABLET | Refills: 0 | Status: SHIPPED | OUTPATIENT
Start: 2019-07-22

## 2019-07-22 RX ORDER — 0.9 % SODIUM CHLORIDE 0.9 %
1000 INTRAVENOUS SOLUTION INTRAVENOUS ONCE
Status: COMPLETED | OUTPATIENT
Start: 2019-07-22 | End: 2019-07-22

## 2019-07-22 RX ORDER — ONDANSETRON 2 MG/ML
4 INJECTION INTRAMUSCULAR; INTRAVENOUS ONCE
Status: COMPLETED | OUTPATIENT
Start: 2019-07-22 | End: 2019-07-22

## 2019-07-22 RX ADMIN — ONDANSETRON 4 MG: 2 INJECTION INTRAMUSCULAR; INTRAVENOUS at 09:15

## 2019-07-22 RX ADMIN — SODIUM CHLORIDE 1000 ML: 9 INJECTION, SOLUTION INTRAVENOUS at 09:15

## 2019-07-22 ASSESSMENT — ENCOUNTER SYMPTOMS
VOMITING: 1
COUGH: 0
COLOR CHANGE: 0
EYE DISCHARGE: 0
DIARRHEA: 0
WHEEZING: 0
SHORTNESS OF BREATH: 0
EYE REDNESS: 0
RHINORRHEA: 0
CONSTIPATION: 0
ABDOMINAL PAIN: 0
BACK PAIN: 0
BLOOD IN STOOL: 0
SORE THROAT: 0

## 2019-07-22 ASSESSMENT — PAIN DESCRIPTION - LOCATION: LOCATION: ABDOMEN

## 2019-07-22 ASSESSMENT — PAIN DESCRIPTION - DESCRIPTORS: DESCRIPTORS: CRAMPING

## 2019-07-22 ASSESSMENT — PAIN DESCRIPTION - ORIENTATION: ORIENTATION: MID

## 2019-07-22 ASSESSMENT — PAIN DESCRIPTION - PAIN TYPE: TYPE: ACUTE PAIN

## 2019-07-22 ASSESSMENT — PAIN SCALES - GENERAL: PAINLEVEL_OUTOF10: 3

## 2019-07-22 ASSESSMENT — PAIN DESCRIPTION - FREQUENCY: FREQUENCY: CONTINUOUS

## 2019-07-22 NOTE — ED NOTES
Patient resting in bed, lights off per patient request. No concerns voiced at this time will continue to monitor      Debara Homans, RN  07/22/19 0988

## 2019-07-23 ASSESSMENT — ENCOUNTER SYMPTOMS: NAUSEA: 1

## 2019-08-08 ENCOUNTER — HOSPITAL ENCOUNTER (OUTPATIENT)
Age: 39
Setting detail: SPECIMEN
Discharge: HOME | End: 2019-08-08
Payer: COMMERCIAL

## 2019-08-08 LAB
THYROXINE, FREE: 0.99 NG/DL (ref 0.93–1.7)
TSH SERPL DL<=0.05 MIU/L-ACNC: 9.99 MIU/L (ref 0.3–5)

## 2019-08-14 ENCOUNTER — PREP FOR PROCEDURE (OUTPATIENT)
Dept: SURGERY | Age: 39
End: 2019-08-14

## 2019-08-14 ENCOUNTER — OFFICE VISIT (OUTPATIENT)
Dept: SURGERY | Age: 39
End: 2019-08-14
Payer: COMMERCIAL

## 2019-08-14 VITALS
TEMPERATURE: 95.1 F | SYSTOLIC BLOOD PRESSURE: 122 MMHG | DIASTOLIC BLOOD PRESSURE: 60 MMHG | WEIGHT: 242.6 LBS | OXYGEN SATURATION: 98 % | BODY MASS INDEX: 42.98 KG/M2 | HEART RATE: 80 BPM | HEIGHT: 63 IN | RESPIRATION RATE: 18 BRPM

## 2019-08-14 DIAGNOSIS — E01.0 THYROMEGALY: Primary | ICD-10-CM

## 2019-08-14 DIAGNOSIS — E03.9 HYPOTHYROIDISM, UNSPECIFIED TYPE: ICD-10-CM

## 2019-08-14 PROCEDURE — 99214 OFFICE O/P EST MOD 30 MIN: CPT | Performed by: SURGERY

## 2019-08-14 PROCEDURE — G8417 CALC BMI ABV UP PARAM F/U: HCPCS | Performed by: SURGERY

## 2019-08-14 PROCEDURE — 4004F PT TOBACCO SCREEN RCVD TLK: CPT | Performed by: SURGERY

## 2019-08-14 PROCEDURE — G8427 DOCREV CUR MEDS BY ELIG CLIN: HCPCS | Performed by: SURGERY

## 2019-08-14 RX ORDER — SODIUM CHLORIDE 9 MG/ML
INJECTION, SOLUTION INTRAVENOUS CONTINUOUS
Status: CANCELLED | OUTPATIENT
Start: 2019-08-14

## 2019-08-14 RX ORDER — LEVOTHYROXINE SODIUM 0.2 MG/1
200 TABLET ORAL DAILY
COMMUNITY

## 2019-08-14 RX ORDER — BUSPIRONE HYDROCHLORIDE 5 MG/1
7.5 TABLET ORAL DAILY
Status: ON HOLD | COMMUNITY
End: 2019-09-19

## 2019-08-23 ASSESSMENT — ENCOUNTER SYMPTOMS
EYE REDNESS: 0
EYE DISCHARGE: 0
SHORTNESS OF BREATH: 0
WHEEZING: 0
SINUS PRESSURE: 0
SORE THROAT: 0
RHINORRHEA: 0
ANAL BLEEDING: 0
COUGH: 0
BACK PAIN: 0
VOMITING: 0
BLOOD IN STOOL: 0
APNEA: 0
DIARRHEA: 0
EYE ITCHING: 0
STRIDOR: 0
ALLERGIC/IMMUNOLOGIC NEGATIVE: 1
TROUBLE SWALLOWING: 1
FACIAL SWELLING: 0
CHEST TIGHTNESS: 0
CONSTIPATION: 0
COLOR CHANGE: 0
RECTAL PAIN: 0
ABDOMINAL DISTENTION: 0
NAUSEA: 0
ABDOMINAL PAIN: 0
PHOTOPHOBIA: 0
CHOKING: 1
VOICE CHANGE: 0
EYE PAIN: 0

## 2019-08-23 NOTE — PROGRESS NOTES
discharge, ear pain, facial swelling, hearing loss, mouth sores, nosebleeds, postnasal drip, rhinorrhea, sinus pressure, sneezing, sore throat, tinnitus and voice change. Eyes: Negative for photophobia, pain, discharge, redness, itching and visual disturbance. Respiratory: Positive for choking. Negative for apnea, cough, chest tightness, shortness of breath, wheezing and stridor. Cardiovascular: Negative for chest pain, palpitations and leg swelling. Gastrointestinal: Negative for abdominal distention, abdominal pain, anal bleeding, blood in stool, constipation, diarrhea, nausea, rectal pain and vomiting. Endocrine: Negative. Genitourinary: Negative for decreased urine volume, difficulty urinating, dyspareunia, dysuria, enuresis, flank pain, frequency, genital sores, hematuria, menstrual problem, pelvic pain, urgency, vaginal bleeding, vaginal discharge and vaginal pain. Musculoskeletal: Negative for arthralgias, back pain, gait problem, joint swelling, myalgias, neck pain and neck stiffness. Skin: Negative for color change, pallor, rash and wound. Allergic/Immunologic: Negative. Neurological: Positive for headaches. Negative for dizziness, tremors, seizures, syncope, facial asymmetry, speech difficulty, weakness, light-headedness and numbness. Hematological: Negative for adenopathy. Does not bruise/bleed easily. Psychiatric/Behavioral: Negative for agitation, behavioral problems, confusion, decreased concentration, dysphoric mood, hallucinations, self-injury, sleep disturbance and suicidal ideas. The patient is not nervous/anxious and is not hyperactive.       Past Medical History:   Diagnosis Date    Adjustment disorder     Anxiety     GERD (gastroesophageal reflux disease)     Hypothyroidism     Obesity     Seasonal allergies     Thyroid nodule        Past Surgical History:   Procedure Laterality Date    BARIATRIC SURGERY  11/15/2018    Dr Sienna Moulton

## 2019-09-11 NOTE — H&P
Subjective:      Patient ID: Emilie Ng is a 45 y.o. female.          Chief Complaint   Patient presents with    Surgical Consult       New patient-referred by JOSUE Osman-Enlarged thyroid goiter      HPI  Chayo Prasad is a 60-year-old female who presents for evaluation of gallbladder disease. She had recently been seen down in the weight management program secondary to her morbid obesity. Underwent bariatric surgery in [place] in 2018. She has been doing really well with weight loss. She has been having complaints of thyroid disease. She states she was originally planning thyroidectomy in [place] but with the move to the area she has not proceeded with it yet. She has history of thyroiditis and worsening thyromegaly causing compression. She feels it is difficult to swallow. Wakes up at night sometimes with a suffocating type of sensation. Thyroid is very visible clinically. Difficulty with her hypothyroidism and control. She has been increasing Synthroid without much success. This has not improved with weight loss since her gastric bypass in member 2018. She does not want to proceed with any type of iodine radiation treatment. Previous ultrasound did not demonstrate many large nodules but overall size of lobes appear to be around 7.6 cm in diameter. Occasional headaches. No nausea or vomiting. Tolerating diet. No chest pain or shortness of breath. No new paresthesia/numbness. Feels tired. She states when her thyroid gets inflamed the size increases even more making compression on throat even worse. Sometimes feels difficult to breathe because of it. Choking sensation occasionally. No new urinary complaints. No hematochezia or melena.     Review of Systems   Constitutional: Positive for fatigue. Negative for activity change, appetite change, chills, diaphoresis, fever and unexpected weight change. HENT: Positive for trouble swallowing.  Negative for congestion, dental problem, drooling, ear discharge, ear pain, facial swelling, hearing loss, mouth sores, nosebleeds, postnasal drip, rhinorrhea, sinus pressure, sneezing, sore throat, tinnitus and voice change. Eyes: Negative for photophobia, pain, discharge, redness, itching and visual disturbance. Respiratory: Positive for choking. Negative for apnea, cough, chest tightness, shortness of breath, wheezing and stridor. Cardiovascular: Negative for chest pain, palpitations and leg swelling. Gastrointestinal: Negative for abdominal distention, abdominal pain, anal bleeding, blood in stool, constipation, diarrhea, nausea, rectal pain and vomiting. Endocrine: Negative. Genitourinary: Negative for decreased urine volume, difficulty urinating, dyspareunia, dysuria, enuresis, flank pain, frequency, genital sores, hematuria, menstrual problem, pelvic pain, urgency, vaginal bleeding, vaginal discharge and vaginal pain. Musculoskeletal: Negative for arthralgias, back pain, gait problem, joint swelling, myalgias, neck pain and neck stiffness. Skin: Negative for color change, pallor, rash and wound. Allergic/Immunologic: Negative. Neurological: Positive for headaches. Negative for dizziness, tremors, seizures, syncope, facial asymmetry, speech difficulty, weakness, light-headedness and numbness. Hematological: Negative for adenopathy. Does not bruise/bleed easily. Psychiatric/Behavioral: Negative for agitation, behavioral problems, confusion, decreased concentration, dysphoric mood, hallucinations, self-injury, sleep disturbance and suicidal ideas.  The patient is not nervous/anxious and is not hyperactive.       Past Medical History        Past Medical History:   Diagnosis Date    Adjustment disorder      Anxiety      GERD (gastroesophageal reflux disease)      Hypothyroidism      Obesity      Seasonal allergies      Thyroid nodule              Past Surgical History         Past Surgical History:   Procedure Laterality gland were obtained with a linear transducer.       FINDINGS:       The right thyroid lobe is enlarged, measuring 7.5 x 2.9 x 3.7 cm. There is heterogeneous echogenicity throughout the thyroid parenchyma. Color Doppler flow is within normal limits. No focal nodule is identified.       The left thyroid lobe is also enlarged measuring 7.6 x 2.7 x 3.8 cm. There is also heterogeneous echogenicity throughout the thyroid parenchyma. No nodule is present. Color Doppler flow is within normal limits.       The thyroid isthmus measures 1.9 cm in the AP dimension and is also enlarged with heterogeneous thyroid parenchyma. No nodule or abnormal color Doppler flow pattern is present.       No abnormal lymph node is identified within the visualized portions of the neck.           Impression       The thyroid is enlarged suggesting a thyroid goiter. Heterogeneous echogenicity is present throughout the thyroid parenchyma which likely corresponds to sequela of prior thyroiditis.                 **This report has been created using voice recognition software. It may contain minor errors which are inherent in voice recognition technology. **       Final report electronically signed by Dr. Balta Goodwin on 4/11/2019 12:58 PM         There is no problem list on file for this patient.     Assessment:   1. Thyromegaly  2. Hypothyroidism  3. Morbid obesity (BMI 42)                Plan:   1. Schedule [facility] for total thyroidectomy. 2. She will undergo pre-operative clearance per anesthesia guidelines with risk factors listed under the past medical history diagnosis & problem list.  3. The risks, benefits and alternatives were discussed with Delfin Mccord including non-operative management. All questions answered. She understands and wishes to proceed with surgical intervention. 4. Restrictions discussed with Delfin Mccord and she expresses understanding.   5. She is advised to call back directly if there are further questions/concerns,

## 2019-09-19 ENCOUNTER — ANESTHESIA (OUTPATIENT)
Dept: OPERATING ROOM | Age: 39
End: 2019-09-19
Payer: COMMERCIAL

## 2019-09-19 ENCOUNTER — HOSPITAL ENCOUNTER (OUTPATIENT)
Age: 39
Discharge: HOME | End: 2019-09-20
Attending: SURGERY | Admitting: SURGERY
Payer: COMMERCIAL

## 2019-09-19 ENCOUNTER — ANESTHESIA EVENT (OUTPATIENT)
Dept: OPERATING ROOM | Age: 39
End: 2019-09-19
Payer: COMMERCIAL

## 2019-09-19 VITALS
OXYGEN SATURATION: 98 % | DIASTOLIC BLOOD PRESSURE: 76 MMHG | SYSTOLIC BLOOD PRESSURE: 114 MMHG | TEMPERATURE: 56.5 F | RESPIRATION RATE: 1 BRPM

## 2019-09-19 DIAGNOSIS — E89.0 S/P THYROIDECTOMY: Primary | ICD-10-CM

## 2019-09-19 PROBLEM — E03.9 HYPOTHYROIDISM: Status: ACTIVE | Noted: 2019-09-19

## 2019-09-19 PROBLEM — E01.0 THYROMEGALY: Status: ACTIVE | Noted: 2019-09-19

## 2019-09-19 LAB
CALCIUM IONIZED: 1.13 MMOL/L (ref 1.12–1.32)
POTASSIUM SERPL-SCNC: 4.4 MEQ/L (ref 3.5–5.2)

## 2019-09-19 PROCEDURE — 2500000003 HC RX 250 WO HCPCS: Performed by: SURGERY

## 2019-09-19 PROCEDURE — 2500000003 HC RX 250 WO HCPCS: Performed by: NURSE ANESTHETIST, CERTIFIED REGISTERED

## 2019-09-19 PROCEDURE — 3600000003 HC SURGERY LEVEL 3 BASE: Performed by: SURGERY

## 2019-09-19 PROCEDURE — 6360000002 HC RX W HCPCS: Performed by: ANESTHESIOLOGY

## 2019-09-19 PROCEDURE — 3700000000 HC ANESTHESIA ATTENDED CARE: Performed by: SURGERY

## 2019-09-19 PROCEDURE — 94760 N-INVAS EAR/PLS OXIMETRY 1: CPT

## 2019-09-19 PROCEDURE — 3700000001 HC ADD 15 MINUTES (ANESTHESIA): Performed by: SURGERY

## 2019-09-19 PROCEDURE — 2580000003 HC RX 258

## 2019-09-19 PROCEDURE — 2580000003 HC RX 258: Performed by: SURGERY

## 2019-09-19 PROCEDURE — 7100000001 HC PACU RECOVERY - ADDTL 15 MIN: Performed by: SURGERY

## 2019-09-19 PROCEDURE — 7100000000 HC PACU RECOVERY - FIRST 15 MIN: Performed by: SURGERY

## 2019-09-19 PROCEDURE — 88307 TISSUE EXAM BY PATHOLOGIST: CPT

## 2019-09-19 PROCEDURE — 84132 ASSAY OF SERUM POTASSIUM: CPT

## 2019-09-19 PROCEDURE — 6360000002 HC RX W HCPCS: Performed by: SURGERY

## 2019-09-19 PROCEDURE — 2709999900 HC NON-CHARGEABLE SUPPLY

## 2019-09-19 PROCEDURE — 82330 ASSAY OF CALCIUM: CPT

## 2019-09-19 PROCEDURE — 2709999900 HC NON-CHARGEABLE SUPPLY: Performed by: SURGERY

## 2019-09-19 PROCEDURE — 36415 COLL VENOUS BLD VENIPUNCTURE: CPT

## 2019-09-19 PROCEDURE — 2720000010 HC SURG SUPPLY STERILE: Performed by: SURGERY

## 2019-09-19 PROCEDURE — 6370000000 HC RX 637 (ALT 250 FOR IP): Performed by: SURGERY

## 2019-09-19 PROCEDURE — 3600000013 HC SURGERY LEVEL 3 ADDTL 15MIN: Performed by: SURGERY

## 2019-09-19 PROCEDURE — 60240 REMOVAL OF THYROID: CPT | Performed by: SURGERY

## 2019-09-19 PROCEDURE — 6360000002 HC RX W HCPCS: Performed by: NURSE ANESTHETIST, CERTIFIED REGISTERED

## 2019-09-19 RX ORDER — DEXAMETHASONE SODIUM PHOSPHATE 4 MG/ML
INJECTION, SOLUTION INTRA-ARTICULAR; INTRALESIONAL; INTRAMUSCULAR; INTRAVENOUS; SOFT TISSUE PRN
Status: DISCONTINUED | OUTPATIENT
Start: 2019-09-19 | End: 2019-09-19 | Stop reason: SDUPTHER

## 2019-09-19 RX ORDER — MEPERIDINE HYDROCHLORIDE 25 MG/ML
12.5 INJECTION INTRAMUSCULAR; INTRAVENOUS; SUBCUTANEOUS EVERY 5 MIN PRN
Status: DISCONTINUED | OUTPATIENT
Start: 2019-09-19 | End: 2019-09-19 | Stop reason: HOSPADM

## 2019-09-19 RX ORDER — SODIUM CHLORIDE 9 MG/ML
INJECTION, SOLUTION INTRAVENOUS CONTINUOUS
Status: DISCONTINUED | OUTPATIENT
Start: 2019-09-19 | End: 2019-09-20 | Stop reason: HOSPADM

## 2019-09-19 RX ORDER — FUROSEMIDE 40 MG/1
40 TABLET ORAL DAILY
Status: DISCONTINUED | OUTPATIENT
Start: 2019-09-19 | End: 2019-09-20 | Stop reason: HOSPADM

## 2019-09-19 RX ORDER — FENTANYL CITRATE 50 UG/ML
50 INJECTION, SOLUTION INTRAMUSCULAR; INTRAVENOUS EVERY 5 MIN PRN
Status: DISCONTINUED | OUTPATIENT
Start: 2019-09-19 | End: 2019-09-19 | Stop reason: HOSPADM

## 2019-09-19 RX ORDER — LANOLIN ALCOHOL/MO/W.PET/CERES
500 CREAM (GRAM) TOPICAL DAILY
Status: DISCONTINUED | OUTPATIENT
Start: 2019-09-20 | End: 2019-09-20 | Stop reason: HOSPADM

## 2019-09-19 RX ORDER — MORPHINE SULFATE 4 MG/ML
4 INJECTION, SOLUTION INTRAMUSCULAR; INTRAVENOUS
Status: DISCONTINUED | OUTPATIENT
Start: 2019-09-19 | End: 2019-09-20 | Stop reason: HOSPADM

## 2019-09-19 RX ORDER — GLYCOPYRROLATE 1 MG/5 ML
SYRINGE (ML) INTRAVENOUS PRN
Status: DISCONTINUED | OUTPATIENT
Start: 2019-09-19 | End: 2019-09-19 | Stop reason: SDUPTHER

## 2019-09-19 RX ORDER — PROMETHAZINE HYDROCHLORIDE 25 MG/ML
6.25 INJECTION, SOLUTION INTRAMUSCULAR; INTRAVENOUS
Status: DISCONTINUED | OUTPATIENT
Start: 2019-09-19 | End: 2019-09-19

## 2019-09-19 RX ORDER — FENTANYL CITRATE 50 UG/ML
INJECTION, SOLUTION INTRAMUSCULAR; INTRAVENOUS PRN
Status: DISCONTINUED | OUTPATIENT
Start: 2019-09-19 | End: 2019-09-19 | Stop reason: SDUPTHER

## 2019-09-19 RX ORDER — ONDANSETRON 2 MG/ML
4 INJECTION INTRAMUSCULAR; INTRAVENOUS
Status: DISCONTINUED | OUTPATIENT
Start: 2019-09-19 | End: 2019-09-19 | Stop reason: HOSPADM

## 2019-09-19 RX ORDER — PROPOFOL 10 MG/ML
INJECTION, EMULSION INTRAVENOUS PRN
Status: DISCONTINUED | OUTPATIENT
Start: 2019-09-19 | End: 2019-09-19 | Stop reason: SDUPTHER

## 2019-09-19 RX ORDER — HYDROCODONE BITARTRATE AND ACETAMINOPHEN 5; 325 MG/1; MG/1
1 TABLET ORAL EVERY 6 HOURS PRN
Status: DISCONTINUED | OUTPATIENT
Start: 2019-09-19 | End: 2019-09-20 | Stop reason: HOSPADM

## 2019-09-19 RX ORDER — MIDAZOLAM HYDROCHLORIDE 1 MG/ML
INJECTION INTRAMUSCULAR; INTRAVENOUS PRN
Status: DISCONTINUED | OUTPATIENT
Start: 2019-09-19 | End: 2019-09-19 | Stop reason: SDUPTHER

## 2019-09-19 RX ORDER — KETOROLAC TROMETHAMINE 30 MG/ML
INJECTION, SOLUTION INTRAMUSCULAR; INTRAVENOUS PRN
Status: DISCONTINUED | OUTPATIENT
Start: 2019-09-19 | End: 2019-09-19 | Stop reason: SDUPTHER

## 2019-09-19 RX ORDER — FENTANYL CITRATE 50 UG/ML
25 INJECTION, SOLUTION INTRAMUSCULAR; INTRAVENOUS EVERY 5 MIN PRN
Status: DISCONTINUED | OUTPATIENT
Start: 2019-09-19 | End: 2019-09-19 | Stop reason: HOSPADM

## 2019-09-19 RX ORDER — LEVOTHYROXINE SODIUM 0.1 MG/1
200 TABLET ORAL DAILY
Status: DISCONTINUED | OUTPATIENT
Start: 2019-09-20 | End: 2019-09-20 | Stop reason: HOSPADM

## 2019-09-19 RX ORDER — HYDROCODONE BITARTRATE AND ACETAMINOPHEN 5; 325 MG/1; MG/1
2 TABLET ORAL EVERY 6 HOURS PRN
Status: DISCONTINUED | OUTPATIENT
Start: 2019-09-19 | End: 2019-09-20 | Stop reason: HOSPADM

## 2019-09-19 RX ORDER — PROMETHAZINE HYDROCHLORIDE 25 MG/ML
25 INJECTION, SOLUTION INTRAMUSCULAR; INTRAVENOUS ONCE
Status: COMPLETED | OUTPATIENT
Start: 2019-09-19 | End: 2019-09-19

## 2019-09-19 RX ORDER — NEOSTIGMINE METHYLSULFATE 5 MG/5 ML
SYRINGE (ML) INTRAVENOUS PRN
Status: DISCONTINUED | OUTPATIENT
Start: 2019-09-19 | End: 2019-09-19 | Stop reason: SDUPTHER

## 2019-09-19 RX ORDER — SODIUM CHLORIDE 9 MG/ML
INJECTION, SOLUTION INTRAVENOUS CONTINUOUS
Status: DISCONTINUED | OUTPATIENT
Start: 2019-09-19 | End: 2019-09-19

## 2019-09-19 RX ORDER — ROCURONIUM BROMIDE 10 MG/ML
INJECTION, SOLUTION INTRAVENOUS PRN
Status: DISCONTINUED | OUTPATIENT
Start: 2019-09-19 | End: 2019-09-19 | Stop reason: SDUPTHER

## 2019-09-19 RX ORDER — ONDANSETRON 2 MG/ML
4 INJECTION INTRAMUSCULAR; INTRAVENOUS EVERY 6 HOURS PRN
Status: DISCONTINUED | OUTPATIENT
Start: 2019-09-19 | End: 2019-09-20 | Stop reason: HOSPADM

## 2019-09-19 RX ORDER — MORPHINE SULFATE 2 MG/ML
2 INJECTION, SOLUTION INTRAMUSCULAR; INTRAVENOUS
Status: DISCONTINUED | OUTPATIENT
Start: 2019-09-19 | End: 2019-09-20 | Stop reason: HOSPADM

## 2019-09-19 RX ORDER — SODIUM CHLORIDE 0.9 % (FLUSH) 0.9 %
10 SYRINGE (ML) INJECTION PRN
Status: DISCONTINUED | OUTPATIENT
Start: 2019-09-19 | End: 2019-09-20 | Stop reason: HOSPADM

## 2019-09-19 RX ORDER — ERGOCALCIFEROL 1.25 MG/1
50000 CAPSULE ORAL WEEKLY
Status: DISCONTINUED | OUTPATIENT
Start: 2019-09-25 | End: 2019-09-20 | Stop reason: HOSPADM

## 2019-09-19 RX ORDER — HYOSCYAMINE SULFATE 0.125 MG
125 TABLET,DISINTEGRATING ORAL EVERY 4 HOURS PRN
Status: DISCONTINUED | OUTPATIENT
Start: 2019-09-19 | End: 2019-09-20 | Stop reason: HOSPADM

## 2019-09-19 RX ORDER — LIDOCAINE HCL/PF 100 MG/5ML
SYRINGE (ML) INJECTION PRN
Status: DISCONTINUED | OUTPATIENT
Start: 2019-09-19 | End: 2019-09-19 | Stop reason: SDUPTHER

## 2019-09-19 RX ORDER — CALCIUM CARBONATE 500(1250)
1000 TABLET ORAL DAILY
Status: DISCONTINUED | OUTPATIENT
Start: 2019-09-20 | End: 2019-09-20 | Stop reason: HOSPADM

## 2019-09-19 RX ORDER — ONDANSETRON 2 MG/ML
INJECTION INTRAMUSCULAR; INTRAVENOUS PRN
Status: DISCONTINUED | OUTPATIENT
Start: 2019-09-19 | End: 2019-09-19 | Stop reason: SDUPTHER

## 2019-09-19 RX ORDER — ESTRADIOL 1 MG/1
2 TABLET ORAL DAILY
Status: DISCONTINUED | OUTPATIENT
Start: 2019-09-20 | End: 2019-09-20 | Stop reason: HOSPADM

## 2019-09-19 RX ORDER — BUPIVACAINE HYDROCHLORIDE AND EPINEPHRINE 5; 5 MG/ML; UG/ML
INJECTION, SOLUTION EPIDURAL; INTRACAUDAL; PERINEURAL PRN
Status: DISCONTINUED | OUTPATIENT
Start: 2019-09-19 | End: 2019-09-19 | Stop reason: HOSPADM

## 2019-09-19 RX ORDER — SPIRONOLACTONE 25 MG/1
50 TABLET ORAL DAILY
Status: DISCONTINUED | OUTPATIENT
Start: 2019-09-20 | End: 2019-09-20 | Stop reason: HOSPADM

## 2019-09-19 RX ORDER — SODIUM CHLORIDE 0.9 % (FLUSH) 0.9 %
10 SYRINGE (ML) INJECTION EVERY 12 HOURS SCHEDULED
Status: DISCONTINUED | OUTPATIENT
Start: 2019-09-19 | End: 2019-09-20 | Stop reason: HOSPADM

## 2019-09-19 RX ORDER — LABETALOL 20 MG/4 ML (5 MG/ML) INTRAVENOUS SYRINGE
5 EVERY 10 MIN PRN
Status: DISCONTINUED | OUTPATIENT
Start: 2019-09-19 | End: 2019-09-19 | Stop reason: HOSPADM

## 2019-09-19 RX ADMIN — ROCURONIUM BROMIDE 50 MG: 10 INJECTION INTRAVENOUS at 14:06

## 2019-09-19 RX ADMIN — SODIUM CHLORIDE: 9 INJECTION, SOLUTION INTRAVENOUS at 12:20

## 2019-09-19 RX ADMIN — FENTANYL CITRATE 50 MCG: 50 INJECTION INTRAMUSCULAR; INTRAVENOUS at 16:20

## 2019-09-19 RX ADMIN — ROCURONIUM BROMIDE 10 MG: 10 INJECTION INTRAVENOUS at 15:03

## 2019-09-19 RX ADMIN — FENTANYL CITRATE 50 MCG: 50 INJECTION INTRAMUSCULAR; INTRAVENOUS at 15:01

## 2019-09-19 RX ADMIN — HYDROCODONE BITARTRATE AND ACETAMINOPHEN 2 TABLET: 5; 325 TABLET ORAL at 22:50

## 2019-09-19 RX ADMIN — Medication 2 MG: at 15:57

## 2019-09-19 RX ADMIN — PROMETHAZINE HYDROCHLORIDE 25 MG: 25 INJECTION INTRAMUSCULAR; INTRAVENOUS at 16:25

## 2019-09-19 RX ADMIN — FAMOTIDINE 20 MG: 10 INJECTION INTRAVENOUS at 20:28

## 2019-09-19 RX ADMIN — Medication 0.4 MG: at 15:57

## 2019-09-19 RX ADMIN — DEXAMETHASONE SODIUM PHOSPHATE 10 MG: 4 INJECTION, SOLUTION INTRAMUSCULAR; INTRAVENOUS at 14:06

## 2019-09-19 RX ADMIN — FENTANYL CITRATE 100 MCG: 50 INJECTION INTRAMUSCULAR; INTRAVENOUS at 14:06

## 2019-09-19 RX ADMIN — Medication 0.2 MG: at 15:43

## 2019-09-19 RX ADMIN — SODIUM CHLORIDE: 9 INJECTION, SOLUTION INTRAVENOUS at 20:29

## 2019-09-19 RX ADMIN — Medication 2 G: at 14:06

## 2019-09-19 RX ADMIN — ONDANSETRON HYDROCHLORIDE 4 MG: 4 INJECTION, SOLUTION INTRAMUSCULAR; INTRAVENOUS at 15:42

## 2019-09-19 RX ADMIN — MIDAZOLAM HYDROCHLORIDE 2 MG: 1 INJECTION, SOLUTION INTRAMUSCULAR; INTRAVENOUS at 14:03

## 2019-09-19 RX ADMIN — Medication 100 MG: at 14:06

## 2019-09-19 RX ADMIN — SODIUM CHLORIDE: 9 INJECTION, SOLUTION INTRAVENOUS at 14:02

## 2019-09-19 RX ADMIN — KETOROLAC TROMETHAMINE 30 MG: 30 INJECTION, SOLUTION INTRAMUSCULAR; INTRAVENOUS at 15:44

## 2019-09-19 RX ADMIN — FENTANYL CITRATE 50 MCG: 50 INJECTION INTRAMUSCULAR; INTRAVENOUS at 16:15

## 2019-09-19 RX ADMIN — PROPOFOL 200 MG: 10 INJECTION, EMULSION INTRAVENOUS at 14:06

## 2019-09-19 RX ADMIN — ROCURONIUM BROMIDE 10 MG: 10 INJECTION INTRAVENOUS at 15:24

## 2019-09-19 RX ADMIN — FENTANYL CITRATE 50 MCG: 50 INJECTION INTRAMUSCULAR; INTRAVENOUS at 15:27

## 2019-09-19 RX ADMIN — FENTANYL CITRATE 50 MCG: 50 INJECTION INTRAMUSCULAR; INTRAVENOUS at 14:37

## 2019-09-19 ASSESSMENT — PAIN SCALES - GENERAL
PAINLEVEL_OUTOF10: 5
PAINLEVEL_OUTOF10: 8
PAINLEVEL_OUTOF10: 0
PAINLEVEL_OUTOF10: 0
PAINLEVEL_OUTOF10: 7
PAINLEVEL_OUTOF10: 6
PAINLEVEL_OUTOF10: 8

## 2019-09-19 ASSESSMENT — PULMONARY FUNCTION TESTS
PIF_VALUE: 28
PIF_VALUE: 28
PIF_VALUE: 2
PIF_VALUE: 28
PIF_VALUE: 29
PIF_VALUE: 28
PIF_VALUE: 27
PIF_VALUE: 3
PIF_VALUE: 31
PIF_VALUE: 28
PIF_VALUE: 30
PIF_VALUE: 29
PIF_VALUE: 27
PIF_VALUE: 28
PIF_VALUE: 28
PIF_VALUE: 18
PIF_VALUE: 26
PIF_VALUE: 27
PIF_VALUE: 28
PIF_VALUE: 27
PIF_VALUE: 29
PIF_VALUE: 28
PIF_VALUE: 28
PIF_VALUE: 30
PIF_VALUE: 28
PIF_VALUE: 26
PIF_VALUE: 28
PIF_VALUE: 28
PIF_VALUE: 16
PIF_VALUE: 5
PIF_VALUE: 30
PIF_VALUE: 0
PIF_VALUE: 28
PIF_VALUE: 25
PIF_VALUE: 28
PIF_VALUE: 28
PIF_VALUE: 27
PIF_VALUE: 2
PIF_VALUE: 28
PIF_VALUE: 25
PIF_VALUE: 28
PIF_VALUE: 28
PIF_VALUE: 31
PIF_VALUE: 26
PIF_VALUE: 29
PIF_VALUE: 29
PIF_VALUE: 2
PIF_VALUE: 29
PIF_VALUE: 28
PIF_VALUE: 1
PIF_VALUE: 28
PIF_VALUE: 29
PIF_VALUE: 3
PIF_VALUE: 26
PIF_VALUE: 28
PIF_VALUE: 27
PIF_VALUE: 28
PIF_VALUE: 30
PIF_VALUE: 28
PIF_VALUE: 29
PIF_VALUE: 26
PIF_VALUE: 28
PIF_VALUE: 18
PIF_VALUE: 28
PIF_VALUE: 28
PIF_VALUE: 26
PIF_VALUE: 28
PIF_VALUE: 26
PIF_VALUE: 1
PIF_VALUE: 28
PIF_VALUE: 26
PIF_VALUE: 29
PIF_VALUE: 25
PIF_VALUE: 28
PIF_VALUE: 29
PIF_VALUE: 2
PIF_VALUE: 28
PIF_VALUE: 2
PIF_VALUE: 28
PIF_VALUE: 26
PIF_VALUE: 1
PIF_VALUE: 28
PIF_VALUE: 29
PIF_VALUE: 28
PIF_VALUE: 28
PIF_VALUE: 27
PIF_VALUE: 29
PIF_VALUE: 28
PIF_VALUE: 27
PIF_VALUE: 29
PIF_VALUE: 27
PIF_VALUE: 28
PIF_VALUE: 26
PIF_VALUE: 3
PIF_VALUE: 27
PIF_VALUE: 1
PIF_VALUE: 26
PIF_VALUE: 28
PIF_VALUE: 29
PIF_VALUE: 28
PIF_VALUE: 28
PIF_VALUE: 31
PIF_VALUE: 18
PIF_VALUE: 30
PIF_VALUE: 28
PIF_VALUE: 28

## 2019-09-19 ASSESSMENT — PAIN - FUNCTIONAL ASSESSMENT: PAIN_FUNCTIONAL_ASSESSMENT: 0-10

## 2019-09-19 ASSESSMENT — PAIN DESCRIPTION - ONSET
ONSET: ON-GOING
ONSET: ON-GOING

## 2019-09-19 ASSESSMENT — PAIN DESCRIPTION - PAIN TYPE
TYPE: SURGICAL PAIN
TYPE: SURGICAL PAIN

## 2019-09-19 ASSESSMENT — PAIN DESCRIPTION - PROGRESSION
CLINICAL_PROGRESSION: NOT CHANGED
CLINICAL_PROGRESSION: NOT CHANGED

## 2019-09-19 ASSESSMENT — PAIN DESCRIPTION - FREQUENCY
FREQUENCY: CONTINUOUS
FREQUENCY: CONTINUOUS

## 2019-09-19 ASSESSMENT — PAIN DESCRIPTION - LOCATION
LOCATION: NECK
LOCATION: NECK

## 2019-09-19 ASSESSMENT — PAIN DESCRIPTION - ORIENTATION
ORIENTATION: ANTERIOR
ORIENTATION: ANTERIOR

## 2019-09-19 ASSESSMENT — PAIN DESCRIPTION - DESCRIPTORS
DESCRIPTORS: ACHING
DESCRIPTORS: ACHING

## 2019-09-19 NOTE — ANESTHESIA PRE PROCEDURE
Last Dose    ceFAZolin (ANCEF) 2 g in dextrose 5 % 50 mL IVPB  2 g Intravenous 30 Min Pre-Op Irving Burt MD        0.9 % sodium chloride infusion   Intravenous Continuous Ladi Diaz  mL/hr at 23/71/45 1220         Allergies:     Allergies   Allergen Reactions    Latex Itching and Rash       Problem List:    Patient Active Problem List   Diagnosis Code    Hypothyroidism E03.9       Past Medical History:        Diagnosis Date    Adjustment disorder     Anxiety     GERD (gastroesophageal reflux disease)     Hypothyroidism     Obesity     Seasonal allergies     Thyroid nodule        Past Surgical History:        Procedure Laterality Date    BARIATRIC SURGERY  11/15/2018    Dr Eulalio Campos  [id number]    x3    [place]LE[place]    [place]    OTHER SURGICAL HISTORY  11/15/2018    [facility] liver biopsy,extensive complex lysis of adhesions from pelvis and small bowel from omentum -Dr Pascual Apley      age 4-[address] [facility]  2009    [place]-Dr Christina Wyatt    UPPER GASTROINTESTINAL ENDOSCOPY  11/15/2018    Dr Avelino Leonard       Social History:    Social History     Tobacco Use    Smoking status: Current Every Day Smoker     Packs/day: 0.50    Smokeless tobacco: Never Used   Substance Use Topics    Alcohol use: Yes     Comment: social holidays                                 Ready to quit: Not Answered  Counseling given: Not Answered      Vital Signs (Current):   Vitals:    09/19/19 1142   BP: 102/65   Pulse: 62   Resp: 16   Temp: 98.2 °F (36.8 °C)   TempSrc: Temporal   SpO2: 98%   Weight: 236 lb 12.8 oz (107.4 kg)   Height: 5' 3\" (1.6 m)                                              BP Readings from Last 3 Encounters:   09/19/19 102/65   08/14/19 122/60   07/22/19 122/74       NPO Status: Time of last liquid consumption: 2200                        Time of last solid consumption: 2200                        Date of last liquid consumption: 09/18/19                        Date of last solid food consumption: 09/18/19    BMI:   Wt Readings from Last 3 Encounters:   09/19/19 236 lb 12.8 oz (107.4 kg)   08/14/19 242 lb 9.6 oz (110 kg)   07/22/19 254 lb (115.2 kg)     Body mass index is 41.95 kg/m². CBC:   Lab Results   Component Value Date    WBC 6.7 07/22/2019    RBC 5.15 07/22/2019    HGB 15.1 07/22/2019    HCT 46.0 07/22/2019    MCV 89.3 07/22/2019    RDW 13.8 03/21/2019     07/22/2019       CMP:   Lab Results   Component Value Date     07/22/2019    K 4.4 09/19/2019     07/22/2019    CO2 22 07/22/2019    BUN 16 07/22/2019    CREATININE 0.7 07/22/2019    GFRAA >60 03/21/2019    LABGLOM >90 07/22/2019    GLUCOSE 94 07/22/2019    PROT 7.5 07/22/2019    CALCIUM 9.3 07/22/2019    BILITOT 0.5 07/22/2019    ALKPHOS 107 07/22/2019    AST 19 07/22/2019    ALT 14 07/22/2019       POC Tests: No results for input(s): POCGLU, POCNA, POCK, POCCL, POCBUN, POCHEMO, POCHCT in the last 72 hours. Coags: No results found for: PROTIME, INR, APTT    HCG (If Applicable): No results found for: PREGTESTUR, PREGSERUM, HCG, HCGQUANT     ABGs: No results found for: PHART, PO2ART, SFM0AVG, MCE9OKD, BEART, G5GCYNTY     Type & Screen (If Applicable):  No results found for: LABABO, LABRH    Anesthesia Evaluation   no history of anesthetic complications:   Airway: Mallampati: II  TM distance: >3 FB   Neck ROM: full  Mouth opening: > = 3 FB Dental:          Pulmonary:normal exam              Patient did not smoke on day of surgery. Cardiovascular:  Exercise tolerance: good (>4 METS),                     Neuro/Psych:   (+) psychiatric history:            GI/Hepatic/Renal:   (+) GERD:, morbid obesity          Endo/Other:    (+) hypothyroidism::., .          Pt had no PAT visit       Abdominal:   (+) obese,         Vascular: negative vascular ROS.                                        Anesthesia Plan      general

## 2019-09-20 VITALS
RESPIRATION RATE: 18 BRPM | WEIGHT: 236.8 LBS | BODY MASS INDEX: 41.96 KG/M2 | SYSTOLIC BLOOD PRESSURE: 100 MMHG | HEIGHT: 63 IN | TEMPERATURE: 97.6 F | OXYGEN SATURATION: 96 % | DIASTOLIC BLOOD PRESSURE: 67 MMHG | HEART RATE: 55 BPM

## 2019-09-20 LAB
ANION GAP SERPL CALCULATED.3IONS-SCNC: 15 MEQ/L (ref 8–16)
BUN BLDV-MCNC: 19 MG/DL (ref 7–22)
CALCIUM IONIZED: 0.84 MMOL/L (ref 1.12–1.32)
CALCIUM SERPL-MCNC: 8.1 MG/DL (ref 8.5–10.5)
CHLORIDE BLD-SCNC: 106 MEQ/L (ref 98–111)
CO2: 19 MEQ/L (ref 23–33)
CREAT SERPL-MCNC: 0.9 MG/DL (ref 0.4–1.2)
GFR SERPL CREATININE-BSD FRML MDRD: 70 ML/MIN/1.73M2
GLUCOSE BLD-MCNC: 165 MG/DL (ref 70–108)
HCT VFR BLD CALC: 40.6 % (ref 37–47)
HEMOGLOBIN: 13.5 GM/DL (ref 12–16)
POTASSIUM SERPL-SCNC: 4.7 MEQ/L (ref 3.5–5.2)
SODIUM BLD-SCNC: 140 MEQ/L (ref 135–145)

## 2019-09-20 PROCEDURE — 96361 HYDRATE IV INFUSION ADD-ON: CPT

## 2019-09-20 PROCEDURE — 96374 THER/PROPH/DIAG INJ IV PUSH: CPT

## 2019-09-20 PROCEDURE — 80048 BASIC METABOLIC PNL TOTAL CA: CPT

## 2019-09-20 PROCEDURE — 99024 POSTOP FOLLOW-UP VISIT: CPT | Performed by: NURSE PRACTITIONER

## 2019-09-20 PROCEDURE — 85014 HEMATOCRIT: CPT

## 2019-09-20 PROCEDURE — 36415 COLL VENOUS BLD VENIPUNCTURE: CPT

## 2019-09-20 PROCEDURE — 2500000003 HC RX 250 WO HCPCS: Performed by: SURGERY

## 2019-09-20 PROCEDURE — 85018 HEMOGLOBIN: CPT

## 2019-09-20 PROCEDURE — 96360 HYDRATION IV INFUSION INIT: CPT

## 2019-09-20 PROCEDURE — 6370000000 HC RX 637 (ALT 250 FOR IP): Performed by: SURGERY

## 2019-09-20 PROCEDURE — 82330 ASSAY OF CALCIUM: CPT

## 2019-09-20 PROCEDURE — 2580000003 HC RX 258: Performed by: SURGERY

## 2019-09-20 RX ORDER — HYDROCODONE BITARTRATE AND ACETAMINOPHEN 5; 325 MG/1; MG/1
1 TABLET ORAL EVERY 6 HOURS PRN
Qty: 25 TABLET | Refills: 0 | Status: SHIPPED | OUTPATIENT
Start: 2019-09-20 | End: 2019-09-27

## 2019-09-20 RX ADMIN — CALCIUM 1000 MG: 500 TABLET ORAL at 07:43

## 2019-09-20 RX ADMIN — HYDROCODONE BITARTRATE AND ACETAMINOPHEN 2 TABLET: 5; 325 TABLET ORAL at 04:21

## 2019-09-20 RX ADMIN — Medication 10 ML: at 07:43

## 2019-09-20 RX ADMIN — FAMOTIDINE 20 MG: 10 INJECTION INTRAVENOUS at 07:43

## 2019-09-20 RX ADMIN — LEVOTHYROXINE SODIUM 200 MCG: 0.1 TABLET ORAL at 06:42

## 2019-09-20 ASSESSMENT — PAIN SCALES - GENERAL
PAINLEVEL_OUTOF10: 5
PAINLEVEL_OUTOF10: 4
PAINLEVEL_OUTOF10: 0
PAINLEVEL_OUTOF10: 7

## 2019-09-20 NOTE — PROGRESS NOTES
1605  Pt. Responds slightly to name on adm. To pacu. Anterior neck dressing intact and dry. Ice applied. 1615  Pt. Becoming tearful. Pt. Complains of pain in neck. Pt. Medicated for pain. 1620  Pt. Taking ice chips for \"sore throat\". 1625  Pt. Medicated for nausea. 1630  Pt. Asleep. o2 sat < 90%. respers < 10.  o2 per nasal cannula applied at 2 liters. 1640  Pt. Resting soundly with eyes closed. 1  Dr. Jeanne Rosario to see pt. Pt. Continues to rest soundly with eyes closed. 1700  Report called to Manuel 83  pacu criteria met. Transfer to [facility]. No family in waiting room at this time.
EBEN Pierce - CNP on 9/20/2019 at 7:47 AM     Patient seen and examined independently by me early this AM. Above discussed and I agree with Christopher Nicole CNP. See my additional comments below for updated orders and plan. Labs, cultures, and radiographs where available were reviewed. I discussed patient concerns with the patient's nurse and instructions were given. Please see our orders for the updated patient care plan. -Advance diet as tolerated. Decrease IV fluids. Normal phonation. DVT prophylaxis. Calcium levels okay. No signs of hypocalcemia. Pathology pending. Synthroid. Home today. Follow-up in office.     Electronically signed by Ladonna Skiff, MD on 9/20/2019 at 4:28 PM

## 2019-09-20 NOTE — OP NOTE
[address] [place] [address]                                OPERATIVE REPORT    PATIENT NAME: Obed Friday                :        1980  MED REC NO:   [id number]                           ROOM:       5912  ACCOUNT NO:   [de-identified]                           ADMIT DATE: 2019  PROVIDER:     Thalia Cooks, M.D.    DATE OF PROCEDURE:  2019    PREOPERATIVE DIAGNOSES:  1. Thyromegaly. 2.  Hypothyroidism. 3.  Dysphagia. POSTOPERATIVE DIAGNOSES:  1. Thyromegaly. 2.  Hypothyroidism. 3.  Dysphagia. PROCEDURE:  Total thyroidectomy. SURGEON:  Thalia Cooks, MD    ASSISTANT:  Antonio Lopez. ALICIA Pacheco    ANESTHESIA:  General/local.    ESTIMATED BLOOD LOSS:  20 mL. DRAINS:  None. COMPLICATIONS:  None. DISPOSITION:  Stable to the recovery room. INDICATIONS:  The patient is a 68-year-old female who I had seen in the  office secondary to worsening dysphagia and choking secondary to  enlarging thyroid. She was found to have hypothyroidism along with  thyromegaly. Both operative and nonoperative intervention plans were  discussed. She understood and wished to proceed with surgery. The  risks of surgery were then further discussed. Some of the risks  included, but were not limited to bleeding, infection, the need for  reoperation, severe chronic postoperative pain or nausea,  cardiopulmonary complications, anesthetic complications, seroma/hematoma  formation, wound breakdown, recurrent laryngeal nerve damage,  hypocalcemia secondary to parathyroid damage, chronic pain and death. After all of the questions were answered in their entirety and the  patient was completely aware of the current situation, she elected to  proceed with the procedure.     DESCRIPTION OF THE PROCEDURE:  After informed consent was signed and  placed on the chart, the patient was taken back to the operating room  and placed supine on the operating room table. General anesthesia was  induced. She tolerated this well throughout the case. All pressure  points were padded. She was on preoperative antibiotics. Bilateral  lower extremity sequential compression devices were placed prior to  incision. Her neck and upper chest were all prepped and draped in the  usual sterile standard fashion after it was slightly hyperextended. A  timeout occurred prior to the operation, which not only identified the  patient, but also the planned procedure to be performed. At the end of  the timeout, there were no questions or concerns. I began the operation by making a symmetrical skin crease incision about  2 cm above the sternal notch. This was deepened through the deep dermal  and subcutaneous tissues. Platysma muscle dissected through. The  infra-platysma flaps were then developed cephalad towards the thyroid  cartilage in the lateral towards the SCM muscle. Further dissection was  then carried out anteriorly to the sternal notch with electrocautery. The sternohyoid, sternothyroid and omohyoid muscles were then retracted  laterally. Thyroid was exposed. This appeared enlarged and very hard. It was multinodular throughout. I began the operation there on the  right side. I gently displaced the right lobe more towards the midline  and the middle thyroid vein was identified and taken with the harmonic. I then mobilized the right upper pole as we retracted more inferiorly  and medially exposing the superior thyroid artery and vein. These were  all skeletonized and taken close to the gland, trying to avoid the  superior laryngeal nerve. The lobe was further retracted medially and what was felt to be the  recurrent laryngeal nerve was identified, but this was difficult given  the hardness of the thyroid along with the scar throughout the area next  to the trachea. This was felt to be fair throughout the rest of the  dissection.   The inferior thyroid artery and vein were taken. What was  felt to be parathyroids were identified and spared. The posterior  medial thyroid was then taken off of the trachea. The isthmus was then  divided. This right lobe was then sent to Pathology for permanent with  a stitch in the superior pole. Similar dissection was then carried out  on the left side. The recurrent laryngeal nerve was much more clearly  able to be identified there on the left side and felt to be protected  throughout the rest of the operation. Dissection was carried out  similarly there using the harmonic. Eventually then the left lobe was  sent to Pathology for permanent with again a stitch in the superior  pole. Irrigation. Irrigant return was clear. Hemostasis appeared to  be adequate, about 20 mL of blood loss. Fibrillar was then placed to  ensure hemostasis. Strap muscles were reapproximated with interrupted  Vicryl suture. Platysma muscle was then reapproximated with interrupted  Vicryl suture. Skin then reapproximated with a running 4-0 Vicryl in a  subcuticular fashion. Closed incision was then cleaned, dried and  Steri-Strips applied. Marcaine 0.5%  with epinephrine was used for  local anesthetic. The patient tolerated the injection of local  anesthetic without difficulty. Sponge, needle and instrumentation count  were correct at the end of the procedure. The patient tolerated the  procedure well with no apparent complications and only about 10 to 20 mL  of blood loss. She was brought out of general anesthesia and then  transferred to postanesthesia care unit in stable condition.         Prince Gomez M.D.    D: 09/19/2019 16:01:59       T: 09/19/2019 23:45:35     [email or web address]  Job#: [id number]     Doc#: [id number]    CC:

## 2019-09-21 ENCOUNTER — HOSPITAL ENCOUNTER (EMERGENCY)
Age: 39
Discharge: HOME | DRG: 427 | End: 2019-09-22
Attending: EMERGENCY MEDICINE
Payer: COMMERCIAL

## 2019-09-21 VITALS
HEART RATE: 68 BPM | TEMPERATURE: 98 F | RESPIRATION RATE: 20 BRPM | SYSTOLIC BLOOD PRESSURE: 119 MMHG | OXYGEN SATURATION: 98 % | DIASTOLIC BLOOD PRESSURE: 78 MMHG

## 2019-09-21 DIAGNOSIS — E89.0 STATUS POST COMPLETE THYROIDECTOMY: ICD-10-CM

## 2019-09-21 DIAGNOSIS — R20.2 NUMBNESS AND TINGLING: ICD-10-CM

## 2019-09-21 DIAGNOSIS — R20.0 NUMBNESS AND TINGLING: ICD-10-CM

## 2019-09-21 DIAGNOSIS — E83.51 HYPOCALCEMIA: Primary | ICD-10-CM

## 2019-09-21 LAB
ALBUMIN SERPL-MCNC: 3.8 G/DL (ref 3.5–5.1)
ALP BLD-CCNC: 111 U/L (ref 38–126)
ALT SERPL-CCNC: 11 U/L (ref 11–66)
ANION GAP SERPL CALCULATED.3IONS-SCNC: 13 MEQ/L (ref 8–16)
AST SERPL-CCNC: 23 U/L (ref 5–40)
BASOPHILS # BLD: 0.3 %
BASOPHILS ABSOLUTE: 0 THOU/MM3 (ref 0–0.1)
BILIRUB SERPL-MCNC: 0.2 MG/DL (ref 0.3–1.2)
BUN BLDV-MCNC: 18 MG/DL (ref 7–22)
CALCIUM IONIZED: 0.87 MMOL/L (ref 1.12–1.32)
CALCIUM SERPL-MCNC: 7 MG/DL (ref 8.5–10.5)
CHLORIDE BLD-SCNC: 103 MEQ/L (ref 98–111)
CO2: 24 MEQ/L (ref 23–33)
CREAT SERPL-MCNC: 1 MG/DL (ref 0.4–1.2)
EKG ATRIAL RATE: 67 BPM
EKG P AXIS: 62 DEGREES
EKG P-R INTERVAL: 136 MS
EKG Q-T INTERVAL: 410 MS
EKG QRS DURATION: 70 MS
EKG QTC CALCULATION (BAZETT): 433 MS
EKG R AXIS: 69 DEGREES
EKG T AXIS: 53 DEGREES
EKG VENTRICULAR RATE: 67 BPM
EOSINOPHIL # BLD: 3.6 %
EOSINOPHILS ABSOLUTE: 0.4 THOU/MM3 (ref 0–0.4)
ERYTHROCYTE [DISTWIDTH] IN BLOOD BY AUTOMATED COUNT: 13.3 % (ref 11.5–14.5)
ERYTHROCYTE [DISTWIDTH] IN BLOOD BY AUTOMATED COUNT: 44.3 FL (ref 35–45)
GFR SERPL CREATININE-BSD FRML MDRD: 62 ML/MIN/1.73M2
GLUCOSE BLD-MCNC: 97 MG/DL (ref 70–108)
HCT VFR BLD CALC: 40.3 % (ref 37–47)
HEMOGLOBIN: 13 GM/DL (ref 12–16)
IMMATURE GRANS (ABS): 0.03 THOU/MM3 (ref 0–0.07)
IMMATURE GRANULOCYTES: 0 %
LYMPHOCYTES # BLD: 32.5 %
LYMPHOCYTES ABSOLUTE: 3.2 THOU/MM3 (ref 1–4.8)
MAGNESIUM: 1.6 MG/DL (ref 1.6–2.4)
MCH RBC QN AUTO: 29.2 PG (ref 26–33)
MCHC RBC AUTO-ENTMCNC: 32.3 GM/DL (ref 32.2–35.5)
MCV RBC AUTO: 90.6 FL (ref 81–99)
MONOCYTES # BLD: 5.2 %
MONOCYTES ABSOLUTE: 0.5 THOU/MM3 (ref 0.4–1.3)
NUCLEATED RED BLOOD CELLS: 0 /100 WBC
OSMOLALITY CALCULATION: 281.2 MOSMOL/KG (ref 275–300)
PLATELET # BLD: 206 THOU/MM3 (ref 130–400)
PMV BLD AUTO: 11.8 FL (ref 9.4–12.4)
POTASSIUM SERPL-SCNC: 3.9 MEQ/L (ref 3.5–5.2)
RBC # BLD: 4.45 MILL/MM3 (ref 4.2–5.4)
SEG NEUTROPHILS: 58.1 %
SEGMENTED NEUTROPHILS ABSOLUTE COUNT: 5.7 THOU/MM3 (ref 1.8–7.7)
SODIUM BLD-SCNC: 140 MEQ/L (ref 135–145)
TOTAL PROTEIN: 6.8 G/DL (ref 6.1–8)
WBC # BLD: 9.8 THOU/MM3 (ref 4.8–10.8)

## 2019-09-21 PROCEDURE — 85025 COMPLETE CBC W/AUTO DIFF WBC: CPT

## 2019-09-21 PROCEDURE — 93005 ELECTROCARDIOGRAM TRACING: CPT | Performed by: EMERGENCY MEDICINE

## 2019-09-21 PROCEDURE — 80053 COMPREHEN METABOLIC PANEL: CPT

## 2019-09-21 PROCEDURE — 36415 COLL VENOUS BLD VENIPUNCTURE: CPT

## 2019-09-21 PROCEDURE — 83735 ASSAY OF MAGNESIUM: CPT

## 2019-09-21 PROCEDURE — 99284 EMERGENCY DEPT VISIT MOD MDM: CPT

## 2019-09-21 PROCEDURE — 82330 ASSAY OF CALCIUM: CPT

## 2019-09-21 ASSESSMENT — ENCOUNTER SYMPTOMS
SHORTNESS OF BREATH: 0
EYE ITCHING: 0
EYE DISCHARGE: 0
ABDOMINAL DISTENTION: 0
ABDOMINAL PAIN: 0
WHEEZING: 0
NAUSEA: 0
DIARRHEA: 0
COUGH: 0
RHINORRHEA: 0
VOMITING: 0

## 2019-09-22 PROCEDURE — 96366 THER/PROPH/DIAG IV INF ADDON: CPT

## 2019-09-22 PROCEDURE — 96365 THER/PROPH/DIAG IV INF INIT: CPT

## 2019-09-22 PROCEDURE — 2580000003 HC RX 258: Performed by: EMERGENCY MEDICINE

## 2019-09-22 PROCEDURE — 6360000002 HC RX W HCPCS: Performed by: EMERGENCY MEDICINE

## 2019-09-22 RX ADMIN — CALCIUM GLUCONATE 2 G: 98 INJECTION, SOLUTION INTRAVENOUS at 00:00

## 2019-09-23 ENCOUNTER — TELEPHONE (OUTPATIENT)
Dept: SURGERY | Age: 39
End: 2019-09-23

## 2019-09-23 ENCOUNTER — OFFICE VISIT (OUTPATIENT)
Dept: SURGERY | Age: 39
End: 2019-09-23

## 2019-09-23 ENCOUNTER — HOSPITAL ENCOUNTER (INPATIENT)
Age: 39
LOS: 4 days | Discharge: HOME | DRG: 427 | End: 2019-09-27
Attending: SURGERY | Admitting: SURGERY
Payer: COMMERCIAL

## 2019-09-23 ENCOUNTER — HOSPITAL ENCOUNTER (OUTPATIENT)
Age: 39
Discharge: HOME | DRG: 427 | End: 2019-09-23
Attending: SURGERY | Admitting: SURGERY
Payer: COMMERCIAL

## 2019-09-23 VITALS
HEART RATE: 80 BPM | BODY MASS INDEX: 42.88 KG/M2 | HEIGHT: 63 IN | TEMPERATURE: 96.4 F | SYSTOLIC BLOOD PRESSURE: 120 MMHG | OXYGEN SATURATION: 96 % | RESPIRATION RATE: 18 BRPM | WEIGHT: 242 LBS | DIASTOLIC BLOOD PRESSURE: 62 MMHG

## 2019-09-23 DIAGNOSIS — E83.51 HYPOCALCEMIA: Primary | ICD-10-CM

## 2019-09-23 DIAGNOSIS — E89.0 S/P TOTAL THYROIDECTOMY: ICD-10-CM

## 2019-09-23 DIAGNOSIS — E83.51 HYPOCALCEMIA: ICD-10-CM

## 2019-09-23 LAB
ANION GAP SERPL CALCULATED.3IONS-SCNC: 14 MEQ/L (ref 8–16)
BUN BLDV-MCNC: 14 MG/DL (ref 7–22)
CALCIUM IONIZED: 0.8 MMOL/L (ref 1.12–1.32)
CALCIUM IONIZED: 0.81 MMOL/L (ref 1.12–1.32)
CALCIUM SERPL-MCNC: 6.8 MG/DL (ref 8.5–10.5)
CHLORIDE BLD-SCNC: 101 MEQ/L (ref 98–111)
CO2: 27 MEQ/L (ref 23–33)
CREAT SERPL-MCNC: 0.8 MG/DL (ref 0.4–1.2)
EKG ATRIAL RATE: 57 BPM
EKG P AXIS: 54 DEGREES
EKG P-R INTERVAL: 138 MS
EKG Q-T INTERVAL: 472 MS
EKG QRS DURATION: 72 MS
EKG QTC CALCULATION (BAZETT): 459 MS
EKG R AXIS: 61 DEGREES
EKG T AXIS: 42 DEGREES
EKG VENTRICULAR RATE: 57 BPM
GFR SERPL CREATININE-BSD FRML MDRD: 80 ML/MIN/1.73M2
GLUCOSE BLD-MCNC: 108 MG/DL (ref 70–108)
MAGNESIUM: 1.4 MG/DL (ref 1.6–2.4)
POTASSIUM SERPL-SCNC: 3.8 MEQ/L (ref 3.5–5.2)
SODIUM BLD-SCNC: 142 MEQ/L (ref 135–145)
VITAMIN D 25-HYDROXY: 17 NG/ML (ref 30–100)

## 2019-09-23 PROCEDURE — 6370000000 HC RX 637 (ALT 250 FOR IP): Performed by: NURSE PRACTITIONER

## 2019-09-23 PROCEDURE — 83735 ASSAY OF MAGNESIUM: CPT

## 2019-09-23 PROCEDURE — 2580000003 HC RX 258: Performed by: NURSE PRACTITIONER

## 2019-09-23 PROCEDURE — 82330 ASSAY OF CALCIUM: CPT

## 2019-09-23 PROCEDURE — 99024 POSTOP FOLLOW-UP VISIT: CPT | Performed by: NURSE PRACTITIONER

## 2019-09-23 PROCEDURE — 80048 BASIC METABOLIC PNL TOTAL CA: CPT

## 2019-09-23 PROCEDURE — 6360000002 HC RX W HCPCS: Performed by: NURSE PRACTITIONER

## 2019-09-23 PROCEDURE — 2709999900 HC NON-CHARGEABLE SUPPLY

## 2019-09-23 PROCEDURE — APPSS45 APP SPLIT SHARED TIME 31-45 MINUTES: Performed by: NURSE PRACTITIONER

## 2019-09-23 PROCEDURE — 93005 ELECTROCARDIOGRAM TRACING: CPT | Performed by: NURSE PRACTITIONER

## 2019-09-23 PROCEDURE — 36415 COLL VENOUS BLD VENIPUNCTURE: CPT

## 2019-09-23 PROCEDURE — 1200000003 HC TELEMETRY R&B

## 2019-09-23 PROCEDURE — 94760 N-INVAS EAR/PLS OXIMETRY 1: CPT

## 2019-09-23 PROCEDURE — 82306 VITAMIN D 25 HYDROXY: CPT

## 2019-09-23 RX ORDER — MAGNESIUM SULFATE IN WATER 40 MG/ML
2 INJECTION, SOLUTION INTRAVENOUS ONCE
Status: COMPLETED | OUTPATIENT
Start: 2019-09-23 | End: 2019-09-23

## 2019-09-23 RX ORDER — CALCIUM CARBONATE 200(500)MG
500 TABLET,CHEWABLE ORAL 4 TIMES DAILY PRN
Status: DISCONTINUED | OUTPATIENT
Start: 2019-09-23 | End: 2019-09-23

## 2019-09-23 RX ORDER — HYDROCODONE BITARTRATE AND ACETAMINOPHEN 5; 325 MG/1; MG/1
1 TABLET ORAL EVERY 4 HOURS PRN
Status: DISCONTINUED | OUTPATIENT
Start: 2019-09-23 | End: 2019-09-27 | Stop reason: HOSPADM

## 2019-09-23 RX ORDER — ESTRADIOL 1 MG/1
2 TABLET ORAL DAILY
Status: DISCONTINUED | OUTPATIENT
Start: 2019-09-23 | End: 2019-09-27 | Stop reason: HOSPADM

## 2019-09-23 RX ORDER — LEVOTHYROXINE SODIUM 0.1 MG/1
200 TABLET ORAL DAILY
Status: DISCONTINUED | OUTPATIENT
Start: 2019-09-23 | End: 2019-09-27 | Stop reason: HOSPADM

## 2019-09-23 RX ORDER — SODIUM CHLORIDE 0.9 % (FLUSH) 0.9 %
10 SYRINGE (ML) INJECTION EVERY 12 HOURS SCHEDULED
Status: DISCONTINUED | OUTPATIENT
Start: 2019-09-23 | End: 2019-09-27 | Stop reason: HOSPADM

## 2019-09-23 RX ORDER — HYDROCODONE BITARTRATE AND ACETAMINOPHEN 5; 325 MG/1; MG/1
2 TABLET ORAL EVERY 4 HOURS PRN
Status: DISCONTINUED | OUTPATIENT
Start: 2019-09-23 | End: 2019-09-27 | Stop reason: HOSPADM

## 2019-09-23 RX ORDER — SODIUM CHLORIDE, SODIUM LACTATE, POTASSIUM CHLORIDE, CALCIUM CHLORIDE 600; 310; 30; 20 MG/100ML; MG/100ML; MG/100ML; MG/100ML
INJECTION, SOLUTION INTRAVENOUS CONTINUOUS
Status: DISCONTINUED | OUTPATIENT
Start: 2019-09-23 | End: 2019-09-24

## 2019-09-23 RX ORDER — FUROSEMIDE 40 MG/1
40 TABLET ORAL DAILY
Status: DISCONTINUED | OUTPATIENT
Start: 2019-09-23 | End: 2019-09-27 | Stop reason: HOSPADM

## 2019-09-23 RX ORDER — ONDANSETRON 2 MG/ML
4 INJECTION INTRAMUSCULAR; INTRAVENOUS EVERY 6 HOURS PRN
Status: DISCONTINUED | OUTPATIENT
Start: 2019-09-23 | End: 2019-09-27 | Stop reason: HOSPADM

## 2019-09-23 RX ORDER — LANOLIN ALCOHOL/MO/W.PET/CERES
500 CREAM (GRAM) TOPICAL DAILY
Status: DISCONTINUED | OUTPATIENT
Start: 2019-09-23 | End: 2019-09-27 | Stop reason: HOSPADM

## 2019-09-23 RX ORDER — SODIUM CHLORIDE 0.9 % (FLUSH) 0.9 %
10 SYRINGE (ML) INJECTION PRN
Status: DISCONTINUED | OUTPATIENT
Start: 2019-09-23 | End: 2019-09-27 | Stop reason: HOSPADM

## 2019-09-23 RX ORDER — CALCIUM CARBONATE 200(500)MG
500 TABLET,CHEWABLE ORAL 4 TIMES DAILY
Status: DISCONTINUED | OUTPATIENT
Start: 2019-09-23 | End: 2019-09-25

## 2019-09-23 RX ORDER — ACETAMINOPHEN 325 MG/1
650 TABLET ORAL EVERY 4 HOURS PRN
Status: DISCONTINUED | OUTPATIENT
Start: 2019-09-23 | End: 2019-09-27 | Stop reason: HOSPADM

## 2019-09-23 RX ORDER — SPIRONOLACTONE 25 MG/1
50 TABLET ORAL DAILY
Status: DISCONTINUED | OUTPATIENT
Start: 2019-09-23 | End: 2019-09-27 | Stop reason: HOSPADM

## 2019-09-23 RX ORDER — CALCITRIOL 0.25 UG/1
0.25 CAPSULE, LIQUID FILLED ORAL 2 TIMES DAILY
Status: DISCONTINUED | OUTPATIENT
Start: 2019-09-23 | End: 2019-09-24

## 2019-09-23 RX ADMIN — CALCITRIOL 0.25 MCG: 0.25 CAPSULE ORAL at 15:36

## 2019-09-23 RX ADMIN — MAGNESIUM SULFATE HEPTAHYDRATE 2 G: 40 INJECTION, SOLUTION INTRAVENOUS at 15:39

## 2019-09-23 RX ADMIN — HYDROCODONE BITARTRATE AND ACETAMINOPHEN 2 TABLET: 5; 325 TABLET ORAL at 15:28

## 2019-09-23 RX ADMIN — HYDROCODONE BITARTRATE AND ACETAMINOPHEN 2 TABLET: 5; 325 TABLET ORAL at 21:13

## 2019-09-23 RX ADMIN — SODIUM CHLORIDE, POTASSIUM CHLORIDE, SODIUM LACTATE AND CALCIUM CHLORIDE: 600; 310; 30; 20 INJECTION, SOLUTION INTRAVENOUS at 15:27

## 2019-09-23 RX ADMIN — CALCITRIOL 0.25 MCG: 0.25 CAPSULE ORAL at 21:09

## 2019-09-23 RX ADMIN — ANTACID TABLETS 500 MG: 500 TABLET, CHEWABLE ORAL at 21:09

## 2019-09-23 RX ADMIN — CALCIUM GLUCONATE 2 G: 98 INJECTION, SOLUTION INTRAVENOUS at 17:25

## 2019-09-23 ASSESSMENT — ENCOUNTER SYMPTOMS
ABDOMINAL DISTENTION: 0
EYE REDNESS: 0
STRIDOR: 0
WHEEZING: 0
EYE PAIN: 0
SINUS PRESSURE: 0
BLOOD IN STOOL: 0
TROUBLE SWALLOWING: 0
DIARRHEA: 0
ABDOMINAL PAIN: 0
SORE THROAT: 0
EYE ITCHING: 0
EYE DISCHARGE: 0
NAUSEA: 0
COUGH: 0
SHORTNESS OF BREATH: 0
CONSTIPATION: 0
BACK PAIN: 0
VOICE CHANGE: 0
CHOKING: 0
RHINORRHEA: 0
FACIAL SWELLING: 0
ANAL BLEEDING: 0
PHOTOPHOBIA: 0
COLOR CHANGE: 0
APNEA: 0
CHEST TIGHTNESS: 0
RECTAL PAIN: 0
VOMITING: 0

## 2019-09-23 ASSESSMENT — PAIN DESCRIPTION - LOCATION
LOCATION: NECK
LOCATION: NECK

## 2019-09-23 ASSESSMENT — PAIN DESCRIPTION - PAIN TYPE
TYPE: ACUTE PAIN;SURGICAL PAIN

## 2019-09-23 ASSESSMENT — PAIN SCALES - GENERAL
PAINLEVEL_OUTOF10: 7
PAINLEVEL_OUTOF10: 0

## 2019-09-23 ASSESSMENT — PAIN DESCRIPTION - ONSET: ONSET: ON-GOING

## 2019-09-23 ASSESSMENT — PAIN DESCRIPTION - PROGRESSION: CLINICAL_PROGRESSION: NOT CHANGED

## 2019-09-23 ASSESSMENT — PAIN - FUNCTIONAL ASSESSMENT: PAIN_FUNCTIONAL_ASSESSMENT: ACTIVITIES ARE NOT PREVENTED

## 2019-09-23 ASSESSMENT — PAIN DESCRIPTION - FREQUENCY: FREQUENCY: CONTINUOUS

## 2019-09-23 ASSESSMENT — PAIN DESCRIPTION - DESCRIPTORS: DESCRIPTORS: ACHING

## 2019-09-23 NOTE — PROGRESS NOTES
Oral BID Argyle Kava, APRN - CNP   0.25 mcg at 09/23/19 1536    lactated ringers infusion   Intravenous Continuous Argyle Kava, APRN -  mL/hr at 09/23/19 1527      glycerin-hypromellose- 0.2-0.2-1 % opthalmic solution 1 drop  1 drop Both Eyes PRN Argyle Kava, APRN - CNP        estradiol (ESTRACE) tablet 2 mg  2 mg Oral Daily Argyle Kava, APRN - CNP        levothyroxine (SYNTHROID) tablet 200 mcg  200 mcg Oral Daily Argyle Kava, APRN - CNP        spironolactone (ALDACTONE) tablet 50 mg  50 mg Oral Daily Argyle Kava, APRN - CNP        vitamin B-12 (CYANOCOBALAMIN) tablet 500 mcg  500 mcg Oral Daily Argyle Kava, APRN - CNP        ondansetron [facility]) injection 4 mg  4 mg Intravenous Q6H PRN Argyle Kava, APRN - CNP        magnesium replacement protocol   Other RX Placeholder Lily Card MD        magnesium sulfate 2 g in 50 mL IVPB premix  2 g Intravenous Once Argyle Kava, APRN - CNP 25 mL/hr at 09/23/19 1539 2 g at 09/23/19 1539    calcium carbonate (TUMS) chewable tablet 500 mg  500 mg Oral 4x Daily Argyle Kava, APRN - CNP        furosemide (LASIX) tablet 40 mg  40 mg Oral Daily Argyle Kava, APRN - CNP        calcium gluconate 2 g in dextrose 5 % 100 mL IVPB  2 g Intravenous Once Argyle Kava, APRN - CNP         Allergies   Allergen Reactions    Latex Itching and Rash       Subjective:     Review of Systems   Constitutional: Positive for activity change, appetite change and fatigue. Negative for chills, diaphoresis, fever and unexpected weight change. HENT: Negative for congestion, dental problem, drooling, ear discharge, ear pain, facial swelling, hearing loss, mouth sores, nosebleeds, postnasal drip, rhinorrhea, sinus pressure, sneezing, sore throat, tinnitus, trouble swallowing and voice change. Eyes: Negative for photophobia, pain, discharge, redness, itching and visual disturbance.    Respiratory: Negative for apnea, Electronically signed by EBEN Balderas CNP on 9/23/2019 at 3:49 PM

## 2019-09-24 LAB
CALCIUM IONIZED: 0.75 MMOL/L (ref 1.12–1.32)
CALCIUM IONIZED: 0.79 MMOL/L (ref 1.12–1.32)
CALCIUM IONIZED: 0.82 MMOL/L (ref 1.12–1.32)
CALCIUM IONIZED: 0.86 MMOL/L (ref 1.12–1.32)

## 2019-09-24 PROCEDURE — 82330 ASSAY OF CALCIUM: CPT

## 2019-09-24 PROCEDURE — 2580000003 HC RX 258: Performed by: NURSE PRACTITIONER

## 2019-09-24 PROCEDURE — 93010 ELECTROCARDIOGRAM REPORT: CPT | Performed by: INTERNAL MEDICINE

## 2019-09-24 PROCEDURE — 6370000000 HC RX 637 (ALT 250 FOR IP): Performed by: NURSE PRACTITIONER

## 2019-09-24 PROCEDURE — 99024 POSTOP FOLLOW-UP VISIT: CPT | Performed by: NURSE PRACTITIONER

## 2019-09-24 PROCEDURE — 1200000003 HC TELEMETRY R&B

## 2019-09-24 PROCEDURE — APPSS30 APP SPLIT SHARED TIME 16-30 MINUTES: Performed by: NURSE PRACTITIONER

## 2019-09-24 PROCEDURE — 2580000003 HC RX 258: Performed by: SURGERY

## 2019-09-24 PROCEDURE — 2709999900 HC NON-CHARGEABLE SUPPLY

## 2019-09-24 PROCEDURE — 36415 COLL VENOUS BLD VENIPUNCTURE: CPT

## 2019-09-24 PROCEDURE — 6360000002 HC RX W HCPCS: Performed by: SURGERY

## 2019-09-24 RX ORDER — CALCITRIOL 0.25 UG/1
0.5 CAPSULE, LIQUID FILLED ORAL DAILY
Status: DISCONTINUED | OUTPATIENT
Start: 2019-09-24 | End: 2019-09-25

## 2019-09-24 RX ADMIN — CALCIUM GLUCONATE 2 G: 98 INJECTION, SOLUTION INTRAVENOUS at 09:17

## 2019-09-24 RX ADMIN — ANTACID TABLETS 500 MG: 500 TABLET, CHEWABLE ORAL at 18:00

## 2019-09-24 RX ADMIN — ANTACID TABLETS 500 MG: 500 TABLET, CHEWABLE ORAL at 09:14

## 2019-09-24 RX ADMIN — CALCIUM GLUCONATE 2 G: 98 INJECTION, SOLUTION INTRAVENOUS at 02:09

## 2019-09-24 RX ADMIN — CALCITRIOL 0.5 MCG: 0.25 CAPSULE ORAL at 09:13

## 2019-09-24 RX ADMIN — ESTRADIOL 2 MG: 1 TABLET ORAL at 10:35

## 2019-09-24 RX ADMIN — FUROSEMIDE 40 MG: 40 TABLET ORAL at 09:16

## 2019-09-24 RX ADMIN — SODIUM CHLORIDE, POTASSIUM CHLORIDE, SODIUM LACTATE AND CALCIUM CHLORIDE: 600; 310; 30; 20 INJECTION, SOLUTION INTRAVENOUS at 11:12

## 2019-09-24 RX ADMIN — LEVOTHYROXINE SODIUM 200 MCG: 100 TABLET ORAL at 05:32

## 2019-09-24 RX ADMIN — Medication 500 MCG: at 09:17

## 2019-09-24 RX ADMIN — HYDROCODONE BITARTRATE AND ACETAMINOPHEN 2 TABLET: 5; 325 TABLET ORAL at 23:22

## 2019-09-24 RX ADMIN — SPIRONOLACTONE 50 MG: 25 TABLET ORAL at 09:17

## 2019-09-24 RX ADMIN — CALCIUM GLUCONATE 2 G: 98 INJECTION, SOLUTION INTRAVENOUS at 23:56

## 2019-09-24 RX ADMIN — ANTACID TABLETS 500 MG: 500 TABLET, CHEWABLE ORAL at 13:37

## 2019-09-24 ASSESSMENT — PAIN DESCRIPTION - ONSET
ONSET: ON-GOING
ONSET_2: ON-GOING

## 2019-09-24 ASSESSMENT — PAIN SCALES - GENERAL
PAINLEVEL_OUTOF10: 0
PAINLEVEL_OUTOF10: 0
PAINLEVEL_OUTOF10: 2
PAINLEVEL_OUTOF10: 0
PAINLEVEL_OUTOF10: 5
PAINLEVEL_OUTOF10: 0
PAINLEVEL_OUTOF10: 0
PAINLEVEL_OUTOF10: 2
PAINLEVEL_OUTOF10: 0
PAINLEVEL_OUTOF10: 0
PAINLEVEL_OUTOF10: 7
PAINLEVEL_OUTOF10: 0

## 2019-09-24 ASSESSMENT — PAIN - FUNCTIONAL ASSESSMENT: PAIN_FUNCTIONAL_ASSESSMENT: ACTIVITIES ARE NOT PREVENTED

## 2019-09-24 ASSESSMENT — PAIN DESCRIPTION - LOCATION
LOCATION_2: LEG
LOCATION: NECK

## 2019-09-24 ASSESSMENT — PAIN DESCRIPTION - ORIENTATION
ORIENTATION: ANTERIOR
ORIENTATION_2: POSTERIOR;RIGHT;LEFT

## 2019-09-24 ASSESSMENT — PAIN DESCRIPTION - INTENSITY: RATING_2: 7

## 2019-09-24 ASSESSMENT — PAIN DESCRIPTION - PAIN TYPE
TYPE_2: ACUTE PAIN
TYPE: ACUTE PAIN;SURGICAL PAIN

## 2019-09-24 ASSESSMENT — PAIN DESCRIPTION - FREQUENCY: FREQUENCY: CONTINUOUS

## 2019-09-24 ASSESSMENT — PAIN DESCRIPTION - PROGRESSION
CLINICAL_PROGRESSION: NOT CHANGED
CLINICAL_PROGRESSION_2: NOT CHANGED

## 2019-09-24 ASSESSMENT — PAIN DESCRIPTION - DURATION: DURATION_2: INTERMITTENT

## 2019-09-24 ASSESSMENT — PAIN DESCRIPTION - DESCRIPTORS
DESCRIPTORS: ACHING
DESCRIPTORS_2: DISCOMFORT;ACHING

## 2019-09-25 LAB
ANION GAP SERPL CALCULATED.3IONS-SCNC: 17 MEQ/L (ref 8–16)
BUN BLDV-MCNC: 13 MG/DL (ref 7–22)
CALCIUM IONIZED: 0.78 MMOL/L (ref 1.12–1.32)
CALCIUM IONIZED: 0.84 MMOL/L (ref 1.12–1.32)
CALCIUM IONIZED: 0.85 MMOL/L (ref 1.12–1.32)
CALCIUM IONIZED: 0.87 MMOL/L (ref 1.12–1.32)
CALCIUM IONIZED: 0.88 MMOL/L (ref 1.12–1.32)
CALCIUM SERPL-MCNC: 7.1 MG/DL (ref 8.5–10.5)
CHLORIDE BLD-SCNC: 100 MEQ/L (ref 98–111)
CO2: 26 MEQ/L (ref 23–33)
CREAT SERPL-MCNC: 0.8 MG/DL (ref 0.4–1.2)
GFR SERPL CREATININE-BSD FRML MDRD: 80 ML/MIN/1.73M2
GLUCOSE BLD-MCNC: 84 MG/DL (ref 70–108)
POTASSIUM SERPL-SCNC: 4 MEQ/L (ref 3.5–5.2)
SODIUM BLD-SCNC: 143 MEQ/L (ref 135–145)

## 2019-09-25 PROCEDURE — 6370000000 HC RX 637 (ALT 250 FOR IP): Performed by: NURSE PRACTITIONER

## 2019-09-25 PROCEDURE — 80048 BASIC METABOLIC PNL TOTAL CA: CPT

## 2019-09-25 PROCEDURE — APPSS30 APP SPLIT SHARED TIME 16-30 MINUTES: Performed by: NURSE PRACTITIONER

## 2019-09-25 PROCEDURE — 94760 N-INVAS EAR/PLS OXIMETRY 1: CPT

## 2019-09-25 PROCEDURE — 6360000002 HC RX W HCPCS: Performed by: NURSE PRACTITIONER

## 2019-09-25 PROCEDURE — 99024 POSTOP FOLLOW-UP VISIT: CPT | Performed by: NURSE PRACTITIONER

## 2019-09-25 PROCEDURE — 36415 COLL VENOUS BLD VENIPUNCTURE: CPT

## 2019-09-25 PROCEDURE — 2580000003 HC RX 258: Performed by: NURSE PRACTITIONER

## 2019-09-25 PROCEDURE — 6360000002 HC RX W HCPCS: Performed by: SURGERY

## 2019-09-25 PROCEDURE — 1200000003 HC TELEMETRY R&B

## 2019-09-25 PROCEDURE — 2709999900 HC NON-CHARGEABLE SUPPLY

## 2019-09-25 PROCEDURE — 2580000003 HC RX 258: Performed by: SURGERY

## 2019-09-25 PROCEDURE — 82330 ASSAY OF CALCIUM: CPT

## 2019-09-25 RX ORDER — CALCITRIOL 0.25 UG/1
0.5 CAPSULE, LIQUID FILLED ORAL 2 TIMES DAILY
Status: DISCONTINUED | OUTPATIENT
Start: 2019-09-25 | End: 2019-09-27 | Stop reason: HOSPADM

## 2019-09-25 RX ORDER — CALCIUM CARBONATE 500(1250)
500 TABLET ORAL DAILY
Status: DISCONTINUED | OUTPATIENT
Start: 2019-09-25 | End: 2019-09-26

## 2019-09-25 RX ADMIN — CALCIUM GLUCONATE 2 G: 94 INJECTION, SOLUTION INTRAVENOUS at 16:48

## 2019-09-25 RX ADMIN — CALCITRIOL 0.5 MCG: 0.25 CAPSULE ORAL at 20:00

## 2019-09-25 RX ADMIN — LEVOTHYROXINE SODIUM 200 MCG: 100 TABLET ORAL at 06:33

## 2019-09-25 RX ADMIN — SODIUM CHLORIDE, PRESERVATIVE FREE 10 ML: 5 INJECTION INTRAVENOUS at 20:00

## 2019-09-25 RX ADMIN — CALCIUM GLUCONATE 2 G: 98 INJECTION, SOLUTION INTRAVENOUS at 10:36

## 2019-09-25 RX ADMIN — Medication 500 MCG: at 10:35

## 2019-09-25 RX ADMIN — CALCIUM 500 MG: 500 TABLET ORAL at 10:34

## 2019-09-25 RX ADMIN — SPIRONOLACTONE 50 MG: 25 TABLET ORAL at 10:35

## 2019-09-25 RX ADMIN — CALCITRIOL 0.5 MCG: 0.25 CAPSULE ORAL at 10:34

## 2019-09-25 RX ADMIN — CALCIUM GLUCONATE 2 G: 98 INJECTION, SOLUTION INTRAVENOUS at 22:12

## 2019-09-25 RX ADMIN — FUROSEMIDE 40 MG: 40 TABLET ORAL at 10:35

## 2019-09-25 RX ADMIN — NICOTINE 1 PUFF: 4 INHALANT RESPIRATORY (INHALATION) at 15:30

## 2019-09-25 RX ADMIN — SODIUM CHLORIDE, PRESERVATIVE FREE 10 ML: 5 INJECTION INTRAVENOUS at 10:35

## 2019-09-25 RX ADMIN — NICOTINE 1 PUFF: 4 INHALANT RESPIRATORY (INHALATION) at 23:19

## 2019-09-25 RX ADMIN — ESTRADIOL 2 MG: 1 TABLET ORAL at 10:35

## 2019-09-25 ASSESSMENT — PAIN SCALES - GENERAL
PAINLEVEL_OUTOF10: 0
PAINLEVEL_OUTOF10: 5

## 2019-09-25 ASSESSMENT — PAIN DESCRIPTION - LOCATION: LOCATION: LEG

## 2019-09-25 ASSESSMENT — PAIN DESCRIPTION - PAIN TYPE: TYPE: ACUTE PAIN

## 2019-09-26 LAB
ANION GAP SERPL CALCULATED.3IONS-SCNC: 19 MEQ/L (ref 8–16)
BUN BLDV-MCNC: 12 MG/DL (ref 7–22)
CALCIUM IONIZED: 0.78 MMOL/L (ref 1.12–1.32)
CALCIUM IONIZED: 0.8 MMOL/L (ref 1.12–1.32)
CALCIUM SERPL-MCNC: 7.3 MG/DL (ref 8.5–10.5)
CHLORIDE BLD-SCNC: 95 MEQ/L (ref 98–111)
CO2: 29 MEQ/L (ref 23–33)
CREAT SERPL-MCNC: 0.8 MG/DL (ref 0.4–1.2)
GFR SERPL CREATININE-BSD FRML MDRD: 80 ML/MIN/1.73M2
GLUCOSE BLD-MCNC: 124 MG/DL (ref 70–108)
MAGNESIUM: 1.8 MG/DL (ref 1.6–2.4)
POTASSIUM SERPL-SCNC: 4.2 MEQ/L (ref 3.5–5.2)
PTH INTACT: 3 PG/ML (ref 15–65)
SODIUM BLD-SCNC: 143 MEQ/L (ref 135–145)

## 2019-09-26 PROCEDURE — 36415 COLL VENOUS BLD VENIPUNCTURE: CPT

## 2019-09-26 PROCEDURE — 80048 BASIC METABOLIC PNL TOTAL CA: CPT

## 2019-09-26 PROCEDURE — 6370000000 HC RX 637 (ALT 250 FOR IP): Performed by: INTERNAL MEDICINE

## 2019-09-26 PROCEDURE — 1200000003 HC TELEMETRY R&B

## 2019-09-26 PROCEDURE — APPSS30 APP SPLIT SHARED TIME 16-30 MINUTES: Performed by: NURSE PRACTITIONER

## 2019-09-26 PROCEDURE — 2580000003 HC RX 258: Performed by: SURGERY

## 2019-09-26 PROCEDURE — 6370000000 HC RX 637 (ALT 250 FOR IP): Performed by: NURSE PRACTITIONER

## 2019-09-26 PROCEDURE — 99024 POSTOP FOLLOW-UP VISIT: CPT | Performed by: NURSE PRACTITIONER

## 2019-09-26 PROCEDURE — 82330 ASSAY OF CALCIUM: CPT

## 2019-09-26 PROCEDURE — 6360000002 HC RX W HCPCS: Performed by: INTERNAL MEDICINE

## 2019-09-26 PROCEDURE — 2580000003 HC RX 258: Performed by: INTERNAL MEDICINE

## 2019-09-26 PROCEDURE — 2580000003 HC RX 258: Performed by: NURSE PRACTITIONER

## 2019-09-26 PROCEDURE — 83735 ASSAY OF MAGNESIUM: CPT

## 2019-09-26 PROCEDURE — 83970 ASSAY OF PARATHORMONE: CPT

## 2019-09-26 PROCEDURE — 6360000002 HC RX W HCPCS: Performed by: SURGERY

## 2019-09-26 RX ORDER — CALCIUM CARBONATE 500(1250)
1500 TABLET ORAL
Status: DISCONTINUED | OUTPATIENT
Start: 2019-09-26 | End: 2019-09-27 | Stop reason: HOSPADM

## 2019-09-26 RX ADMIN — CALCITRIOL 0.5 MCG: 0.25 CAPSULE ORAL at 10:01

## 2019-09-26 RX ADMIN — ESTRADIOL 2 MG: 1 TABLET ORAL at 10:02

## 2019-09-26 RX ADMIN — CALCIUM 1500 MG: 500 TABLET ORAL at 20:14

## 2019-09-26 RX ADMIN — Medication 10 ML: at 17:25

## 2019-09-26 RX ADMIN — Medication 500 MCG: at 10:02

## 2019-09-26 RX ADMIN — LEVOTHYROXINE SODIUM 200 MCG: 100 TABLET ORAL at 05:14

## 2019-09-26 RX ADMIN — CALCIUM 1500 MG: 500 TABLET ORAL at 16:47

## 2019-09-26 RX ADMIN — CALCIUM GLUCONATE 2 G: 98 INJECTION, SOLUTION INTRAVENOUS at 07:30

## 2019-09-26 RX ADMIN — CALCITRIOL 0.5 MCG: 0.25 CAPSULE ORAL at 20:14

## 2019-09-26 RX ADMIN — CALCIUM 500 MG: 500 TABLET ORAL at 10:01

## 2019-09-26 RX ADMIN — CALCIUM GLUCONATE 2 G: 98 INJECTION, SOLUTION INTRAVENOUS at 17:23

## 2019-09-26 RX ADMIN — SODIUM CHLORIDE, PRESERVATIVE FREE 10 ML: 5 INJECTION INTRAVENOUS at 10:06

## 2019-09-26 ASSESSMENT — PAIN SCALES - GENERAL
PAINLEVEL_OUTOF10: 0

## 2019-09-27 VITALS
SYSTOLIC BLOOD PRESSURE: 109 MMHG | WEIGHT: 237 LBS | HEIGHT: 63 IN | RESPIRATION RATE: 18 BRPM | BODY MASS INDEX: 41.99 KG/M2 | OXYGEN SATURATION: 97 % | HEART RATE: 62 BPM | TEMPERATURE: 97.7 F | DIASTOLIC BLOOD PRESSURE: 61 MMHG

## 2019-09-27 PROBLEM — E89.2 HYPOPARATHYROIDISM AFTER PROCEDURE (HCC): Status: ACTIVE | Noted: 2019-09-27

## 2019-09-27 LAB
CALCIUM IONIZED: 0.78 MMOL/L (ref 1.12–1.32)
CALCIUM IONIZED: 0.86 MMOL/L (ref 1.12–1.32)
T4 FREE: 1.29 NG/DL (ref 0.93–1.76)

## 2019-09-27 PROCEDURE — APPSS30 APP SPLIT SHARED TIME 16-30 MINUTES: Performed by: NURSE PRACTITIONER

## 2019-09-27 PROCEDURE — 2580000003 HC RX 258: Performed by: NURSE PRACTITIONER

## 2019-09-27 PROCEDURE — 6360000002 HC RX W HCPCS: Performed by: INTERNAL MEDICINE

## 2019-09-27 PROCEDURE — 6370000000 HC RX 637 (ALT 250 FOR IP): Performed by: INTERNAL MEDICINE

## 2019-09-27 PROCEDURE — 2580000003 HC RX 258: Performed by: INTERNAL MEDICINE

## 2019-09-27 PROCEDURE — 36415 COLL VENOUS BLD VENIPUNCTURE: CPT

## 2019-09-27 PROCEDURE — 99024 POSTOP FOLLOW-UP VISIT: CPT | Performed by: NURSE PRACTITIONER

## 2019-09-27 PROCEDURE — 6370000000 HC RX 637 (ALT 250 FOR IP): Performed by: NURSE PRACTITIONER

## 2019-09-27 PROCEDURE — 84439 ASSAY OF FREE THYROXINE: CPT

## 2019-09-27 PROCEDURE — 82330 ASSAY OF CALCIUM: CPT

## 2019-09-27 RX ORDER — CALCITRIOL 0.5 UG/1
0.5 CAPSULE, LIQUID FILLED ORAL 2 TIMES DAILY
Qty: 60 CAPSULE | Refills: 0 | Status: ON HOLD | OUTPATIENT
Start: 2019-09-27 | End: 2019-10-02 | Stop reason: HOSPADM

## 2019-09-27 RX ORDER — CALCIUM CARBONATE 500(1250)
1500 TABLET ORAL
Qty: 30 TABLET | Refills: 3 | Status: ON HOLD | OUTPATIENT
Start: 2019-09-27 | End: 2019-10-02 | Stop reason: HOSPADM

## 2019-09-27 RX ADMIN — CALCIUM GLUCONATE 2 G: 98 INJECTION, SOLUTION INTRAVENOUS at 05:58

## 2019-09-27 RX ADMIN — Medication 500 MCG: at 09:25

## 2019-09-27 RX ADMIN — CALCIUM GLUCONATE 2 G: 98 INJECTION, SOLUTION INTRAVENOUS at 01:35

## 2019-09-27 RX ADMIN — CALCIUM 1500 MG: 500 TABLET ORAL at 13:25

## 2019-09-27 RX ADMIN — SODIUM CHLORIDE, PRESERVATIVE FREE 10 ML: 5 INJECTION INTRAVENOUS at 09:27

## 2019-09-27 RX ADMIN — ESTRADIOL 2 MG: 1 TABLET ORAL at 09:25

## 2019-09-27 RX ADMIN — CALCITRIOL 0.5 MCG: 0.25 CAPSULE ORAL at 09:25

## 2019-09-27 RX ADMIN — CALCIUM 1500 MG: 500 TABLET ORAL at 09:25

## 2019-09-27 RX ADMIN — LEVOTHYROXINE SODIUM 200 MCG: 100 TABLET ORAL at 05:58

## 2019-09-27 ASSESSMENT — PAIN SCALES - GENERAL
PAINLEVEL_OUTOF10: 0
PAINLEVEL_OUTOF10: 0

## 2019-09-28 ENCOUNTER — HOSPITAL ENCOUNTER (INPATIENT)
Age: 39
LOS: 4 days | Discharge: HOME | DRG: 425 | End: 2019-10-02
Attending: INTERNAL MEDICINE | Admitting: INTERNAL MEDICINE
Payer: COMMERCIAL

## 2019-09-28 DIAGNOSIS — E83.42 HYPOMAGNESEMIA: ICD-10-CM

## 2019-09-28 DIAGNOSIS — E83.51 HYPOCALCEMIA: Primary | ICD-10-CM

## 2019-09-28 PROBLEM — Z98.84 HISTORY OF ROUX-EN-Y GASTRIC BYPASS: Status: ACTIVE | Noted: 2019-09-28

## 2019-09-28 LAB
ALBUMIN SERPL-MCNC: 3.9 G/DL (ref 3.5–5.1)
ALP BLD-CCNC: 126 U/L (ref 38–126)
ALT SERPL-CCNC: 10 U/L (ref 11–66)
ANION GAP SERPL CALCULATED.3IONS-SCNC: 16 MEQ/L (ref 8–16)
AST SERPL-CCNC: 15 U/L (ref 5–40)
BASOPHILS # BLD: 0.6 %
BASOPHILS ABSOLUTE: 0.1 THOU/MM3 (ref 0–0.1)
BILIRUB SERPL-MCNC: 0.4 MG/DL (ref 0.3–1.2)
BUN BLDV-MCNC: 15 MG/DL (ref 7–22)
CALCIUM IONIZED: 0.74 MMOL/L (ref 1.12–1.32)
CALCIUM IONIZED: 0.85 MMOL/L (ref 1.12–1.32)
CALCIUM SERPL-MCNC: 6.9 MG/DL (ref 8.5–10.5)
CHLORIDE BLD-SCNC: 96 MEQ/L (ref 98–111)
CO2: 29 MEQ/L (ref 23–33)
CREAT SERPL-MCNC: 0.8 MG/DL (ref 0.4–1.2)
EOSINOPHIL # BLD: 6.8 %
EOSINOPHILS ABSOLUTE: 0.6 THOU/MM3 (ref 0–0.4)
ERYTHROCYTE [DISTWIDTH] IN BLOOD BY AUTOMATED COUNT: 13.2 % (ref 11.5–14.5)
ERYTHROCYTE [DISTWIDTH] IN BLOOD BY AUTOMATED COUNT: 43.4 FL (ref 35–45)
GFR SERPL CREATININE-BSD FRML MDRD: 80 ML/MIN/1.73M2
GLUCOSE BLD-MCNC: 91 MG/DL (ref 70–108)
HCT VFR BLD CALC: 44.4 % (ref 37–47)
HEMOGLOBIN: 14.5 GM/DL (ref 12–16)
IMMATURE GRANS (ABS): 0.02 THOU/MM3 (ref 0–0.07)
IMMATURE GRANULOCYTES: 0.2 %
LYMPHOCYTES # BLD: 30.9 %
LYMPHOCYTES ABSOLUTE: 2.8 THOU/MM3 (ref 1–4.8)
MAGNESIUM: 1.9 MG/DL (ref 1.6–2.4)
MCH RBC QN AUTO: 29.4 PG (ref 26–33)
MCHC RBC AUTO-ENTMCNC: 32.7 GM/DL (ref 32.2–35.5)
MCV RBC AUTO: 90.1 FL (ref 81–99)
MONOCYTES # BLD: 6.9 %
MONOCYTES ABSOLUTE: 0.6 THOU/MM3 (ref 0.4–1.3)
NUCLEATED RED BLOOD CELLS: 0 /100 WBC
OSMOLALITY CALCULATION: 281.7 MOSMOL/KG (ref 275–300)
PLATELET # BLD: 257 THOU/MM3 (ref 130–400)
PMV BLD AUTO: 11 FL (ref 9.4–12.4)
POTASSIUM SERPL-SCNC: 4.3 MEQ/L (ref 3.5–5.2)
PTH INTACT: 2.9 PG/ML (ref 15–65)
RBC # BLD: 4.93 MILL/MM3 (ref 4.2–5.4)
SEG NEUTROPHILS: 54.6 %
SEGMENTED NEUTROPHILS ABSOLUTE COUNT: 4.9 THOU/MM3 (ref 1.8–7.7)
SODIUM BLD-SCNC: 141 MEQ/L (ref 135–145)
T4 FREE: 1.36 NG/DL (ref 0.93–1.76)
TOTAL PROTEIN: 7.4 G/DL (ref 6.1–8)
TSH SERPL DL<=0.05 MIU/L-ACNC: 6.35 UIU/ML (ref 0.4–4.2)
WBC # BLD: 9 THOU/MM3 (ref 4.8–10.8)

## 2019-09-28 PROCEDURE — 80053 COMPREHEN METABOLIC PANEL: CPT

## 2019-09-28 PROCEDURE — 84443 ASSAY THYROID STIM HORMONE: CPT

## 2019-09-28 PROCEDURE — 99284 EMERGENCY DEPT VISIT MOD MDM: CPT

## 2019-09-28 PROCEDURE — 2709999900 HC NON-CHARGEABLE SUPPLY

## 2019-09-28 PROCEDURE — 6360000002 HC RX W HCPCS: Performed by: INTERNAL MEDICINE

## 2019-09-28 PROCEDURE — 84439 ASSAY OF FREE THYROXINE: CPT

## 2019-09-28 PROCEDURE — 83970 ASSAY OF PARATHORMONE: CPT

## 2019-09-28 PROCEDURE — 99222 1ST HOSP IP/OBS MODERATE 55: CPT | Performed by: INTERNAL MEDICINE

## 2019-09-28 PROCEDURE — 83735 ASSAY OF MAGNESIUM: CPT

## 2019-09-28 PROCEDURE — 2580000003 HC RX 258: Performed by: INTERNAL MEDICINE

## 2019-09-28 PROCEDURE — 36415 COLL VENOUS BLD VENIPUNCTURE: CPT

## 2019-09-28 PROCEDURE — 1200000003 HC TELEMETRY R&B

## 2019-09-28 PROCEDURE — 85025 COMPLETE CBC W/AUTO DIFF WBC: CPT

## 2019-09-28 PROCEDURE — 82330 ASSAY OF CALCIUM: CPT

## 2019-09-28 RX ORDER — PSEUDOEPHEDRINE HCL 120 MG/1
120 TABLET, FILM COATED, EXTENDED RELEASE ORAL DAILY
Status: DISCONTINUED | OUTPATIENT
Start: 2019-09-29 | End: 2019-09-28 | Stop reason: SDUPTHER

## 2019-09-28 RX ORDER — LEVOTHYROXINE SODIUM 0.1 MG/1
200 TABLET ORAL DAILY
Status: DISCONTINUED | OUTPATIENT
Start: 2019-09-29 | End: 2019-10-02 | Stop reason: HOSPADM

## 2019-09-28 RX ORDER — SODIUM CHLORIDE 0.9 % (FLUSH) 0.9 %
10 SYRINGE (ML) INJECTION EVERY 12 HOURS SCHEDULED
Status: DISCONTINUED | OUTPATIENT
Start: 2019-09-28 | End: 2019-10-02 | Stop reason: HOSPADM

## 2019-09-28 RX ORDER — SODIUM CHLORIDE 0.9 % (FLUSH) 0.9 %
10 SYRINGE (ML) INJECTION PRN
Status: DISCONTINUED | OUTPATIENT
Start: 2019-09-28 | End: 2019-10-02 | Stop reason: HOSPADM

## 2019-09-28 RX ORDER — ONDANSETRON 2 MG/ML
4 INJECTION INTRAMUSCULAR; INTRAVENOUS EVERY 6 HOURS PRN
Status: DISCONTINUED | OUTPATIENT
Start: 2019-09-28 | End: 2019-10-02 | Stop reason: HOSPADM

## 2019-09-28 RX ORDER — ESTRADIOL 1 MG/1
2 TABLET ORAL DAILY
Status: DISCONTINUED | OUTPATIENT
Start: 2019-09-29 | End: 2019-10-02 | Stop reason: HOSPADM

## 2019-09-28 RX ORDER — M-VIT,TX,IRON,MINS/CALC/FOLIC 27MG-0.4MG
1 TABLET ORAL DAILY
Status: DISCONTINUED | OUTPATIENT
Start: 2019-09-29 | End: 2019-10-02 | Stop reason: HOSPADM

## 2019-09-28 RX ORDER — CALCIUM CARBONATE 500(1250)
2000 TABLET ORAL
Status: DISCONTINUED | OUTPATIENT
Start: 2019-09-28 | End: 2019-09-29

## 2019-09-28 RX ORDER — POTASSIUM CHLORIDE 7.45 MG/ML
10 INJECTION INTRAVENOUS PRN
Status: DISCONTINUED | OUTPATIENT
Start: 2019-09-28 | End: 2019-10-02 | Stop reason: HOSPADM

## 2019-09-28 RX ORDER — CETIRIZINE HYDROCHLORIDE 10 MG/1
10 TABLET ORAL DAILY
Status: DISCONTINUED | OUTPATIENT
Start: 2019-09-29 | End: 2019-10-02 | Stop reason: HOSPADM

## 2019-09-28 RX ORDER — POLYVINYL ALCOHOL 14 MG/ML
1 SOLUTION/ DROPS OPHTHALMIC PRN
Status: DISCONTINUED | OUTPATIENT
Start: 2019-09-28 | End: 2019-10-02 | Stop reason: HOSPADM

## 2019-09-28 RX ORDER — POTASSIUM CHLORIDE 20 MEQ/1
40 TABLET, EXTENDED RELEASE ORAL PRN
Status: DISCONTINUED | OUTPATIENT
Start: 2019-09-28 | End: 2019-10-02 | Stop reason: HOSPADM

## 2019-09-28 RX ORDER — LORATADINE AND PSEUDOEPHEDRINE 10; 240 MG/1; MG/1
1 TABLET, EXTENDED RELEASE ORAL DAILY
Status: DISCONTINUED | OUTPATIENT
Start: 2019-09-28 | End: 2019-09-28 | Stop reason: SDUPTHER

## 2019-09-28 RX ORDER — CETIRIZINE HYDROCHLORIDE 10 MG/1
10 TABLET ORAL DAILY
Status: DISCONTINUED | OUTPATIENT
Start: 2019-09-29 | End: 2019-09-28 | Stop reason: SDUPTHER

## 2019-09-28 RX ORDER — FUROSEMIDE 40 MG/1
40 TABLET ORAL DAILY PRN
Status: DISCONTINUED | OUTPATIENT
Start: 2019-09-28 | End: 2019-10-02 | Stop reason: HOSPADM

## 2019-09-28 RX ORDER — SPIRONOLACTONE 25 MG/1
50 TABLET ORAL DAILY PRN
Status: DISCONTINUED | OUTPATIENT
Start: 2019-09-28 | End: 2019-10-02 | Stop reason: HOSPADM

## 2019-09-28 RX ORDER — CALCITRIOL 0.25 UG/1
0.5 CAPSULE, LIQUID FILLED ORAL 2 TIMES DAILY
Status: DISCONTINUED | OUTPATIENT
Start: 2019-09-29 | End: 2019-09-29

## 2019-09-28 RX ORDER — PSEUDOEPHEDRINE HCL 120 MG/1
120 TABLET, FILM COATED, EXTENDED RELEASE ORAL DAILY
Status: DISCONTINUED | OUTPATIENT
Start: 2019-09-29 | End: 2019-10-02 | Stop reason: HOSPADM

## 2019-09-28 RX ORDER — LANOLIN ALCOHOL/MO/W.PET/CERES
500 CREAM (GRAM) TOPICAL DAILY
Status: DISCONTINUED | OUTPATIENT
Start: 2019-09-29 | End: 2019-10-02 | Stop reason: HOSPADM

## 2019-09-28 RX ADMIN — CALCIUM GLUCONATE 2 G: 98 INJECTION, SOLUTION INTRAVENOUS at 16:39

## 2019-09-28 RX ADMIN — SODIUM CHLORIDE, PRESERVATIVE FREE 10 ML: 5 INJECTION INTRAVENOUS at 21:08

## 2019-09-28 ASSESSMENT — PAIN DESCRIPTION - FREQUENCY: FREQUENCY: INTERMITTENT

## 2019-09-28 ASSESSMENT — PAIN DESCRIPTION - PAIN TYPE: TYPE: ACUTE PAIN

## 2019-09-28 ASSESSMENT — PAIN DESCRIPTION - PROGRESSION: CLINICAL_PROGRESSION: GRADUALLY WORSENING

## 2019-09-28 ASSESSMENT — PAIN DESCRIPTION - DESCRIPTORS: DESCRIPTORS: SPASM

## 2019-09-28 ASSESSMENT — PAIN DESCRIPTION - LOCATION: LOCATION: HAND

## 2019-09-28 ASSESSMENT — PAIN SCALES - GENERAL
PAINLEVEL_OUTOF10: 0
PAINLEVEL_OUTOF10: 0
PAINLEVEL_OUTOF10: 10

## 2019-09-28 ASSESSMENT — PAIN DESCRIPTION - ORIENTATION: ORIENTATION: LEFT;RIGHT

## 2019-09-29 LAB
ANION GAP SERPL CALCULATED.3IONS-SCNC: 17 MEQ/L (ref 8–16)
BUN BLDV-MCNC: 17 MG/DL (ref 7–22)
CALCIUM IONIZED: 0.73 MMOL/L (ref 1.12–1.32)
CALCIUM IONIZED: 0.8 MMOL/L (ref 1.12–1.32)
CALCIUM IONIZED: 0.83 MMOL/L (ref 1.12–1.32)
CALCIUM IONIZED: 0.89 MMOL/L (ref 1.12–1.32)
CALCIUM SERPL-MCNC: 6.6 MG/DL (ref 8.5–10.5)
CHLORIDE BLD-SCNC: 97 MEQ/L (ref 98–111)
CO2: 27 MEQ/L (ref 23–33)
CREAT SERPL-MCNC: 0.8 MG/DL (ref 0.4–1.2)
GFR SERPL CREATININE-BSD FRML MDRD: 80 ML/MIN/1.73M2
GLUCOSE BLD-MCNC: 91 MG/DL (ref 70–108)
PHOSPHORUS: 8.3 MG/DL (ref 2.4–4.7)
POTASSIUM REFLEX MAGNESIUM: 4.1 MEQ/L (ref 3.5–5.2)
SODIUM BLD-SCNC: 141 MEQ/L (ref 135–145)

## 2019-09-29 PROCEDURE — 84100 ASSAY OF PHOSPHORUS: CPT

## 2019-09-29 PROCEDURE — 99233 SBSQ HOSP IP/OBS HIGH 50: CPT | Performed by: INTERNAL MEDICINE

## 2019-09-29 PROCEDURE — 6360000002 HC RX W HCPCS: Performed by: PHYSICIAN ASSISTANT

## 2019-09-29 PROCEDURE — 2709999900 HC NON-CHARGEABLE SUPPLY

## 2019-09-29 PROCEDURE — 6370000000 HC RX 637 (ALT 250 FOR IP): Performed by: INTERNAL MEDICINE

## 2019-09-29 PROCEDURE — 1200000003 HC TELEMETRY R&B

## 2019-09-29 PROCEDURE — 82330 ASSAY OF CALCIUM: CPT

## 2019-09-29 PROCEDURE — 6360000002 HC RX W HCPCS: Performed by: INTERNAL MEDICINE

## 2019-09-29 PROCEDURE — G0008 ADMIN INFLUENZA VIRUS VAC: HCPCS | Performed by: INTERNAL MEDICINE

## 2019-09-29 PROCEDURE — 36415 COLL VENOUS BLD VENIPUNCTURE: CPT

## 2019-09-29 PROCEDURE — 2580000003 HC RX 258: Performed by: PHYSICIAN ASSISTANT

## 2019-09-29 PROCEDURE — 80048 BASIC METABOLIC PNL TOTAL CA: CPT

## 2019-09-29 PROCEDURE — 90686 IIV4 VACC NO PRSV 0.5 ML IM: CPT | Performed by: INTERNAL MEDICINE

## 2019-09-29 RX ORDER — CALCITRIOL 0.25 UG/1
1 CAPSULE, LIQUID FILLED ORAL
Status: DISCONTINUED | OUTPATIENT
Start: 2019-09-30 | End: 2019-09-29 | Stop reason: SDUPTHER

## 2019-09-29 RX ORDER — CALCITRIOL 0.5 UG/1
1 CAPSULE, LIQUID FILLED ORAL
Status: DISCONTINUED | OUTPATIENT
Start: 2019-09-29 | End: 2019-09-30

## 2019-09-29 RX ORDER — CALCITRIOL 0.25 UG/1
1 CAPSULE, LIQUID FILLED ORAL 2 TIMES DAILY
Status: DISCONTINUED | OUTPATIENT
Start: 2019-09-29 | End: 2019-09-29

## 2019-09-29 RX ORDER — PSEUDOEPHEDRINE HCL 30 MG
1000 TABLET ORAL
Status: DISCONTINUED | OUTPATIENT
Start: 2019-09-29 | End: 2019-10-01

## 2019-09-29 RX ADMIN — CALCITRIOL 1 MCG: 0.25 CAPSULE ORAL at 13:15

## 2019-09-29 RX ADMIN — MULTIPLE VITAMINS W/ MINERALS TAB 1 TABLET: TAB at 08:30

## 2019-09-29 RX ADMIN — LEVOTHYROXINE SODIUM 200 MCG: 100 TABLET ORAL at 05:58

## 2019-09-29 RX ADMIN — CALCITRIOL 1 MCG: 0.5 CAPSULE, LIQUID FILLED ORAL at 18:22

## 2019-09-29 RX ADMIN — CALCIUM GLUCONATE 2 G: 98 INJECTION, SOLUTION INTRAVENOUS at 05:58

## 2019-09-29 RX ADMIN — CALCIUM GLUCONATE 2 G: 98 INJECTION, SOLUTION INTRAVENOUS at 18:21

## 2019-09-29 RX ADMIN — Medication 1000 MG: at 18:20

## 2019-09-29 RX ADMIN — CALCIUM GLUCONATE 2 G: 98 INJECTION, SOLUTION INTRAVENOUS at 11:52

## 2019-09-29 RX ADMIN — ESTRADIOL 2 MG: 1 TABLET ORAL at 08:30

## 2019-09-29 RX ADMIN — INFLUENZA A VIRUS A/BRISBANE/02/2018 IVR-190 (H1N1) ANTIGEN (PROPIOLACTONE INACTIVATED), INFLUENZA A VIRUS A/KANSAS/14/2017 X-327 (H3N2) ANTIGEN (PROPIOLACTONE INACTIVATED), INFLUENZA B VIRUS B/MARYLAND/15/2016 ANTIGEN (PROPIOLACTONE INACTIVATED), INFLUENZA B VIRUS B/PHUKET/3073/2013 BVR-1B ANTIGEN (PROPIOLACTONE INACTIVATED) 0.5 ML: 15; 15; 15; 15 INJECTION, SUSPENSION INTRAMUSCULAR at 08:34

## 2019-09-29 RX ADMIN — Medication 500 MCG: at 08:30

## 2019-09-29 ASSESSMENT — PAIN SCALES - GENERAL
PAINLEVEL_OUTOF10: 0

## 2019-09-30 LAB
CALCIUM IONIZED: 0.82 MMOL/L (ref 1.12–1.32)
CALCIUM IONIZED: 0.91 MMOL/L (ref 1.12–1.32)

## 2019-09-30 PROCEDURE — 36415 COLL VENOUS BLD VENIPUNCTURE: CPT

## 2019-09-30 PROCEDURE — 6370000000 HC RX 637 (ALT 250 FOR IP): Performed by: INTERNAL MEDICINE

## 2019-09-30 PROCEDURE — 2580000003 HC RX 258: Performed by: INTERNAL MEDICINE

## 2019-09-30 PROCEDURE — 99232 SBSQ HOSP IP/OBS MODERATE 35: CPT | Performed by: INTERNAL MEDICINE

## 2019-09-30 PROCEDURE — APPNB30 APP NON BILLABLE TIME 0-30 MINS: Performed by: NURSE PRACTITIONER

## 2019-09-30 PROCEDURE — 82330 ASSAY OF CALCIUM: CPT

## 2019-09-30 PROCEDURE — 2580000003 HC RX 258: Performed by: PHYSICIAN ASSISTANT

## 2019-09-30 PROCEDURE — 1200000003 HC TELEMETRY R&B

## 2019-09-30 PROCEDURE — 2709999900 HC NON-CHARGEABLE SUPPLY

## 2019-09-30 PROCEDURE — 6360000002 HC RX W HCPCS: Performed by: PHYSICIAN ASSISTANT

## 2019-09-30 RX ORDER — CALCITRIOL 0.25 UG/1
1 CAPSULE, LIQUID FILLED ORAL 4 TIMES DAILY
Status: DISCONTINUED | OUTPATIENT
Start: 2019-10-01 | End: 2019-09-30

## 2019-09-30 RX ORDER — CALCITRIOL 0.25 UG/1
1 CAPSULE, LIQUID FILLED ORAL 4 TIMES DAILY
Status: DISCONTINUED | OUTPATIENT
Start: 2019-09-30 | End: 2019-10-01

## 2019-09-30 RX ADMIN — CALCITRIOL 1 MCG: 0.5 CAPSULE, LIQUID FILLED ORAL at 17:02

## 2019-09-30 RX ADMIN — CALCITRIOL 1 MCG: 0.5 CAPSULE, LIQUID FILLED ORAL at 09:37

## 2019-09-30 RX ADMIN — ESTRADIOL 2 MG: 1 TABLET ORAL at 09:37

## 2019-09-30 RX ADMIN — SODIUM CHLORIDE, PRESERVATIVE FREE 10 ML: 5 INJECTION INTRAVENOUS at 00:09

## 2019-09-30 RX ADMIN — Medication 500 MCG: at 09:38

## 2019-09-30 RX ADMIN — MAGNESIUM GLUCONATE 500 MG ORAL TABLET 400 MG: 500 TABLET ORAL at 21:19

## 2019-09-30 RX ADMIN — Medication 1000 MG: at 12:52

## 2019-09-30 RX ADMIN — Medication 1000 MG: at 21:17

## 2019-09-30 RX ADMIN — CALCITRIOL 1 MCG: 0.5 CAPSULE, LIQUID FILLED ORAL at 12:52

## 2019-09-30 RX ADMIN — CALCIUM GLUCONATE 2 G: 98 INJECTION, SOLUTION INTRAVENOUS at 00:09

## 2019-09-30 RX ADMIN — SODIUM CHLORIDE, PRESERVATIVE FREE 10 ML: 5 INJECTION INTRAVENOUS at 09:39

## 2019-09-30 RX ADMIN — Medication 1000 MG: at 17:02

## 2019-09-30 RX ADMIN — Medication 1000 MG: at 00:09

## 2019-09-30 RX ADMIN — Medication 1000 MG: at 09:36

## 2019-09-30 RX ADMIN — CALCIUM GLUCONATE 2 G: 98 INJECTION, SOLUTION INTRAVENOUS at 15:19

## 2019-09-30 RX ADMIN — SODIUM CHLORIDE, PRESERVATIVE FREE 10 ML: 5 INJECTION INTRAVENOUS at 21:20

## 2019-09-30 RX ADMIN — CALCITRIOL 1 MCG: 0.25 CAPSULE, LIQUID FILLED ORAL at 21:19

## 2019-09-30 RX ADMIN — LEVOTHYROXINE SODIUM 200 MCG: 100 TABLET ORAL at 05:46

## 2019-09-30 RX ADMIN — MULTIPLE VITAMINS W/ MINERALS TAB 1 TABLET: TAB at 09:37

## 2019-09-30 ASSESSMENT — PAIN SCALES - GENERAL
PAINLEVEL_OUTOF10: 0
PAINLEVEL_OUTOF10: 0

## 2019-10-01 LAB
ANION GAP SERPL CALCULATED.3IONS-SCNC: 20 MEQ/L (ref 8–16)
BUN BLDV-MCNC: 15 MG/DL (ref 7–22)
CALCIUM IONIZED: 0.89 MMOL/L (ref 1.12–1.32)
CALCIUM IONIZED: 0.96 MMOL/L (ref 1.12–1.32)
CALCIUM IONIZED: 1 MMOL/L (ref 1.12–1.32)
CALCIUM SERPL-MCNC: 8.8 MG/DL (ref 8.5–10.5)
CHLORIDE BLD-SCNC: 98 MEQ/L (ref 98–111)
CO2: 23 MEQ/L (ref 23–33)
CREAT SERPL-MCNC: 0.8 MG/DL (ref 0.4–1.2)
GFR SERPL CREATININE-BSD FRML MDRD: 80 ML/MIN/1.73M2
GLUCOSE BLD-MCNC: 149 MG/DL (ref 70–108)
POTASSIUM SERPL-SCNC: 4 MEQ/L (ref 3.5–5.2)
SODIUM BLD-SCNC: 141 MEQ/L (ref 135–145)

## 2019-10-01 PROCEDURE — 6370000000 HC RX 637 (ALT 250 FOR IP): Performed by: STUDENT IN AN ORGANIZED HEALTH CARE EDUCATION/TRAINING PROGRAM

## 2019-10-01 PROCEDURE — 2580000003 HC RX 258: Performed by: PHYSICIAN ASSISTANT

## 2019-10-01 PROCEDURE — 2709999900 HC NON-CHARGEABLE SUPPLY

## 2019-10-01 PROCEDURE — 99232 SBSQ HOSP IP/OBS MODERATE 35: CPT | Performed by: FAMILY MEDICINE

## 2019-10-01 PROCEDURE — 6360000002 HC RX W HCPCS: Performed by: PHYSICIAN ASSISTANT

## 2019-10-01 PROCEDURE — 6370000000 HC RX 637 (ALT 250 FOR IP): Performed by: INTERNAL MEDICINE

## 2019-10-01 PROCEDURE — 82330 ASSAY OF CALCIUM: CPT

## 2019-10-01 PROCEDURE — 80048 BASIC METABOLIC PNL TOTAL CA: CPT

## 2019-10-01 PROCEDURE — 36415 COLL VENOUS BLD VENIPUNCTURE: CPT

## 2019-10-01 PROCEDURE — 2580000003 HC RX 258: Performed by: INTERNAL MEDICINE

## 2019-10-01 PROCEDURE — 1200000003 HC TELEMETRY R&B

## 2019-10-01 RX ORDER — PSEUDOEPHEDRINE HCL 30 MG
1800 TABLET ORAL 3 TIMES DAILY
Status: DISCONTINUED | OUTPATIENT
Start: 2019-10-01 | End: 2019-10-01

## 2019-10-01 RX ORDER — PSEUDOEPHEDRINE HCL 30 MG
1000 TABLET ORAL 4 TIMES DAILY
Status: DISCONTINUED | OUTPATIENT
Start: 2019-10-01 | End: 2019-10-02 | Stop reason: HOSPADM

## 2019-10-01 RX ORDER — CALCITRIOL 0.25 UG/1
1 CAPSULE, LIQUID FILLED ORAL 3 TIMES DAILY
Status: DISCONTINUED | OUTPATIENT
Start: 2019-10-01 | End: 2019-10-01

## 2019-10-01 RX ORDER — ERGOCALCIFEROL 1.25 MG/1
50000 CAPSULE ORAL WEEKLY
Status: DISCONTINUED | OUTPATIENT
Start: 2019-10-01 | End: 2019-10-02 | Stop reason: HOSPADM

## 2019-10-01 RX ORDER — CALCITRIOL 0.25 UG/1
1 CAPSULE, LIQUID FILLED ORAL 4 TIMES DAILY
Status: DISCONTINUED | OUTPATIENT
Start: 2019-10-01 | End: 2019-10-02 | Stop reason: HOSPADM

## 2019-10-01 RX ADMIN — CALCIUM GLUCONATE 2 G: 98 INJECTION, SOLUTION INTRAVENOUS at 02:33

## 2019-10-01 RX ADMIN — SODIUM CHLORIDE, PRESERVATIVE FREE 10 ML: 5 INJECTION INTRAVENOUS at 08:20

## 2019-10-01 RX ADMIN — ERGOCALCIFEROL 50000 UNITS: 1.25 CAPSULE ORAL at 14:41

## 2019-10-01 RX ADMIN — CALCITRIOL 1 MCG: 0.25 CAPSULE, LIQUID FILLED ORAL at 18:28

## 2019-10-01 RX ADMIN — CALCITRIOL 1 MCG: 0.25 CAPSULE, LIQUID FILLED ORAL at 22:05

## 2019-10-01 RX ADMIN — ESTRADIOL 2 MG: 1 TABLET ORAL at 08:16

## 2019-10-01 RX ADMIN — Medication 1000 MG: at 18:29

## 2019-10-01 RX ADMIN — LEVOTHYROXINE SODIUM 200 MCG: 100 TABLET ORAL at 08:19

## 2019-10-01 RX ADMIN — CALCIUM GLUCONATE 2 G: 98 INJECTION, SOLUTION INTRAVENOUS at 14:41

## 2019-10-01 RX ADMIN — Medication 1000 MG: at 13:40

## 2019-10-01 RX ADMIN — Medication 1000 MG: at 22:04

## 2019-10-01 RX ADMIN — Medication 500 MCG: at 08:16

## 2019-10-01 RX ADMIN — MULTIPLE VITAMINS W/ MINERALS TAB 1 TABLET: TAB at 08:16

## 2019-10-01 RX ADMIN — Medication 1000 MG: at 08:16

## 2019-10-01 RX ADMIN — CALCITRIOL 1 MCG: 0.25 CAPSULE, LIQUID FILLED ORAL at 13:39

## 2019-10-01 RX ADMIN — MAGNESIUM GLUCONATE 500 MG ORAL TABLET 400 MG: 500 TABLET ORAL at 08:18

## 2019-10-01 RX ADMIN — CALCITRIOL 1 MCG: 0.25 CAPSULE, LIQUID FILLED ORAL at 08:16

## 2019-10-01 ASSESSMENT — PAIN SCALES - GENERAL
PAINLEVEL_OUTOF10: 0

## 2019-10-02 VITALS
HEIGHT: 63 IN | BODY MASS INDEX: 41.99 KG/M2 | TEMPERATURE: 97.9 F | HEART RATE: 81 BPM | DIASTOLIC BLOOD PRESSURE: 71 MMHG | RESPIRATION RATE: 16 BRPM | WEIGHT: 237 LBS | OXYGEN SATURATION: 99 % | SYSTOLIC BLOOD PRESSURE: 97 MMHG

## 2019-10-02 LAB
ANION GAP SERPL CALCULATED.3IONS-SCNC: 14 MEQ/L (ref 8–16)
BUN BLDV-MCNC: 17 MG/DL (ref 7–22)
CALCIUM IONIZED: 0.93 MMOL/L (ref 1.12–1.32)
CALCIUM SERPL-MCNC: 8.8 MG/DL (ref 8.5–10.5)
CHLORIDE BLD-SCNC: 96 MEQ/L (ref 98–111)
CO2: 28 MEQ/L (ref 23–33)
CREAT SERPL-MCNC: 0.8 MG/DL (ref 0.4–1.2)
GFR SERPL CREATININE-BSD FRML MDRD: 80 ML/MIN/1.73M2
GLUCOSE BLD-MCNC: 110 MG/DL (ref 70–108)
MAGNESIUM: 1.8 MG/DL (ref 1.6–2.4)
POTASSIUM SERPL-SCNC: 4.3 MEQ/L (ref 3.5–5.2)
SODIUM BLD-SCNC: 138 MEQ/L (ref 135–145)

## 2019-10-02 PROCEDURE — 6370000000 HC RX 637 (ALT 250 FOR IP): Performed by: PHYSICIAN ASSISTANT

## 2019-10-02 PROCEDURE — 80048 BASIC METABOLIC PNL TOTAL CA: CPT

## 2019-10-02 PROCEDURE — 6370000000 HC RX 637 (ALT 250 FOR IP): Performed by: INTERNAL MEDICINE

## 2019-10-02 PROCEDURE — 99239 HOSP IP/OBS DSCHRG MGMT >30: CPT | Performed by: FAMILY MEDICINE

## 2019-10-02 PROCEDURE — 2580000003 HC RX 258: Performed by: PHYSICIAN ASSISTANT

## 2019-10-02 PROCEDURE — 36415 COLL VENOUS BLD VENIPUNCTURE: CPT

## 2019-10-02 PROCEDURE — 82330 ASSAY OF CALCIUM: CPT

## 2019-10-02 PROCEDURE — 83735 ASSAY OF MAGNESIUM: CPT

## 2019-10-02 PROCEDURE — 2580000003 HC RX 258: Performed by: INTERNAL MEDICINE

## 2019-10-02 PROCEDURE — 6360000002 HC RX W HCPCS: Performed by: PHYSICIAN ASSISTANT

## 2019-10-02 RX ORDER — CALCITRIOL 0.5 UG/1
1 CAPSULE, LIQUID FILLED ORAL 4 TIMES DAILY
Qty: 120 CAPSULE | Refills: 0 | Status: SHIPPED | OUTPATIENT
Start: 2019-10-02 | End: 2021-02-15 | Stop reason: SDUPTHER

## 2019-10-02 RX ORDER — PSEUDOEPHEDRINE HCL 30 MG
1000 TABLET ORAL 4 TIMES DAILY
Qty: 480 TABLET | Refills: 0 | Status: SHIPPED | OUTPATIENT
Start: 2019-10-02 | End: 2020-10-31

## 2019-10-02 RX ORDER — ERGOCALCIFEROL (VITAMIN D2) 1250 MCG
50000 CAPSULE ORAL WEEKLY
Qty: 5 CAPSULE | Refills: 0 | Status: SHIPPED | OUTPATIENT
Start: 2019-10-08 | End: 2021-02-15 | Stop reason: SDUPTHER

## 2019-10-02 RX ORDER — CALCIUM CARBONATE 500(1250)
1000 TABLET ORAL 3 TIMES DAILY
Status: DISCONTINUED | OUTPATIENT
Start: 2019-10-02 | End: 2019-10-02 | Stop reason: HOSPADM

## 2019-10-02 RX ADMIN — CALCITRIOL 1 MCG: 0.25 CAPSULE, LIQUID FILLED ORAL at 09:46

## 2019-10-02 RX ADMIN — MAGNESIUM GLUCONATE 500 MG ORAL TABLET 400 MG: 500 TABLET ORAL at 09:47

## 2019-10-02 RX ADMIN — LEVOTHYROXINE SODIUM 200 MCG: 100 TABLET ORAL at 06:43

## 2019-10-02 RX ADMIN — MULTIPLE VITAMINS W/ MINERALS TAB 1 TABLET: TAB at 09:48

## 2019-10-02 RX ADMIN — ESTRADIOL 2 MG: 1 TABLET ORAL at 09:47

## 2019-10-02 RX ADMIN — Medication 500 MCG: at 09:48

## 2019-10-02 RX ADMIN — SODIUM CHLORIDE, PRESERVATIVE FREE 10 ML: 5 INJECTION INTRAVENOUS at 09:47

## 2019-10-02 RX ADMIN — CALCIUM GLUCONATE 2 G: 98 INJECTION, SOLUTION INTRAVENOUS at 01:30

## 2019-10-02 RX ADMIN — CALCIUM 1000 MG: 500 TABLET ORAL at 09:46

## 2019-10-02 RX ADMIN — Medication 1000 MG: at 09:46

## 2019-10-02 ASSESSMENT — PAIN SCALES - GENERAL
PAINLEVEL_OUTOF10: 0
PAINLEVEL_OUTOF10: 0

## 2019-10-05 ASSESSMENT — ENCOUNTER SYMPTOMS
NAUSEA: 0
EYE DISCHARGE: 0
COUGH: 0
RHINORRHEA: 0
SORE THROAT: 0
DIARRHEA: 0
ABDOMINAL PAIN: 0
WHEEZING: 0
VOMITING: 0
EYE PAIN: 0
BACK PAIN: 0
SHORTNESS OF BREATH: 0

## 2019-10-09 ENCOUNTER — OFFICE VISIT (OUTPATIENT)
Dept: SURGERY | Age: 39
End: 2019-10-09

## 2019-10-09 VITALS
OXYGEN SATURATION: 94 % | BODY MASS INDEX: 42.29 KG/M2 | SYSTOLIC BLOOD PRESSURE: 118 MMHG | HEIGHT: 63 IN | TEMPERATURE: 98.1 F | WEIGHT: 238.7 LBS | RESPIRATION RATE: 18 BRPM | HEART RATE: 103 BPM | DIASTOLIC BLOOD PRESSURE: 60 MMHG

## 2019-10-09 DIAGNOSIS — E89.0 S/P TOTAL THYROIDECTOMY: ICD-10-CM

## 2019-10-09 DIAGNOSIS — E83.51 HYPOCALCEMIA: Primary | ICD-10-CM

## 2019-10-09 PROCEDURE — 99024 POSTOP FOLLOW-UP VISIT: CPT | Performed by: NURSE PRACTITIONER

## 2019-10-09 RX ORDER — CALCITRIOL 0.5 UG/1
1 CAPSULE, LIQUID FILLED ORAL 4 TIMES DAILY
Qty: 56 CAPSULE | Refills: 0 | Status: SHIPPED | OUTPATIENT
Start: 2019-10-09 | End: 2020-06-04 | Stop reason: SDUPTHER

## 2019-10-09 RX ORDER — MAGNESIUM OXIDE 400 MG/1
400 TABLET ORAL DAILY
Qty: 7 TABLET | Refills: 0 | Status: SHIPPED | OUTPATIENT
Start: 2019-10-09 | End: 2019-10-16

## 2019-10-09 ASSESSMENT — ENCOUNTER SYMPTOMS
COUGH: 0
RHINORRHEA: 0
VOMITING: 0
BACK PAIN: 0
ABDOMINAL PAIN: 0
STRIDOR: 0
PHOTOPHOBIA: 0
ABDOMINAL DISTENTION: 0
CHOKING: 0
SINUS PRESSURE: 0
TROUBLE SWALLOWING: 0
SORE THROAT: 0
SHORTNESS OF BREATH: 0
BLOOD IN STOOL: 0
NAUSEA: 0
EYE ITCHING: 0
APNEA: 0
COLOR CHANGE: 0
FACIAL SWELLING: 0
EYE DISCHARGE: 0
WHEEZING: 0
RECTAL PAIN: 0
CHEST TIGHTNESS: 0
DIARRHEA: 0
CONSTIPATION: 0
ANAL BLEEDING: 0
EYE REDNESS: 0
EYE PAIN: 0

## 2019-10-13 ENCOUNTER — HOSPITAL ENCOUNTER (EMERGENCY)
Age: 39
Discharge: HOME | End: 2019-10-14
Payer: COMMERCIAL

## 2019-10-13 DIAGNOSIS — E83.51 HYPOCALCEMIA: Primary | ICD-10-CM

## 2019-10-13 PROBLEM — E01.0 THYROMEGALY: Status: RESOLVED | Noted: 2019-09-19 | Resolved: 2019-10-13

## 2019-10-13 LAB
ALBUMIN SERPL-MCNC: 3.9 G/DL (ref 3.5–5.1)
ALBUMIN SERPL-MCNC: 3.9 G/DL (ref 3.5–5.1)
ALP BLD-CCNC: 133 U/L (ref 38–126)
ALP BLD-CCNC: 138 U/L (ref 38–126)
ALT SERPL-CCNC: 11 U/L (ref 11–66)
ALT SERPL-CCNC: 11 U/L (ref 11–66)
ANION GAP SERPL CALCULATED.3IONS-SCNC: 13 MEQ/L (ref 8–16)
ANION GAP SERPL CALCULATED.3IONS-SCNC: 15 MEQ/L (ref 8–16)
AST SERPL-CCNC: 17 U/L (ref 5–40)
AST SERPL-CCNC: 20 U/L (ref 5–40)
BASOPHILS # BLD: 0.4 %
BASOPHILS ABSOLUTE: 0 THOU/MM3 (ref 0–0.1)
BILIRUB SERPL-MCNC: 0.2 MG/DL (ref 0.3–1.2)
BILIRUB SERPL-MCNC: 0.2 MG/DL (ref 0.3–1.2)
BILIRUBIN DIRECT: < 0.2 MG/DL (ref 0–0.3)
BUN BLDV-MCNC: 19 MG/DL (ref 7–22)
BUN BLDV-MCNC: 20 MG/DL (ref 7–22)
CALCIUM IONIZED: 0.75 MMOL/L (ref 1.12–1.32)
CALCIUM SERPL-MCNC: 6.8 MG/DL (ref 8.5–10.5)
CALCIUM SERPL-MCNC: 6.9 MG/DL (ref 8.5–10.5)
CHLORIDE BLD-SCNC: 100 MEQ/L (ref 98–111)
CHLORIDE BLD-SCNC: 100 MEQ/L (ref 98–111)
CO2: 27 MEQ/L (ref 23–33)
CO2: 29 MEQ/L (ref 23–33)
CREAT SERPL-MCNC: 0.9 MG/DL (ref 0.4–1.2)
CREAT SERPL-MCNC: 0.9 MG/DL (ref 0.4–1.2)
EKG ATRIAL RATE: 88 BPM
EKG P AXIS: 45 DEGREES
EKG P-R INTERVAL: 130 MS
EKG Q-T INTERVAL: 410 MS
EKG QRS DURATION: 70 MS
EKG QTC CALCULATION (BAZETT): 496 MS
EKG R AXIS: 17 DEGREES
EKG T AXIS: 38 DEGREES
EKG VENTRICULAR RATE: 88 BPM
EOSINOPHIL # BLD: 5.2 %
EOSINOPHILS ABSOLUTE: 0.6 THOU/MM3 (ref 0–0.4)
ERYTHROCYTE [DISTWIDTH] IN BLOOD BY AUTOMATED COUNT: 13.2 % (ref 11.5–14.5)
ERYTHROCYTE [DISTWIDTH] IN BLOOD BY AUTOMATED COUNT: 43.6 FL (ref 35–45)
GFR SERPL CREATININE-BSD FRML MDRD: 70 ML/MIN/1.73M2
GFR SERPL CREATININE-BSD FRML MDRD: 70 ML/MIN/1.73M2
GLUCOSE BLD-MCNC: 121 MG/DL (ref 70–108)
GLUCOSE BLD-MCNC: 124 MG/DL (ref 70–108)
HCT VFR BLD CALC: 43.6 % (ref 37–47)
HEMOGLOBIN: 14.2 GM/DL (ref 12–16)
IMMATURE GRANS (ABS): 0.03 THOU/MM3 (ref 0–0.07)
IMMATURE GRANULOCYTES: 0.3 %
LYMPHOCYTES # BLD: 29.7 %
LYMPHOCYTES ABSOLUTE: 3.2 THOU/MM3 (ref 1–4.8)
MCH RBC QN AUTO: 29.5 PG (ref 26–33)
MCHC RBC AUTO-ENTMCNC: 32.6 GM/DL (ref 32.2–35.5)
MCV RBC AUTO: 90.5 FL (ref 81–99)
MONOCYTES # BLD: 4.7 %
MONOCYTES ABSOLUTE: 0.5 THOU/MM3 (ref 0.4–1.3)
NUCLEATED RED BLOOD CELLS: 0 /100 WBC
OSMOLALITY CALCULATION: 286.8 MOSMOL/KG (ref 275–300)
OSMOLALITY CALCULATION: 287 MOSMOL/KG (ref 275–300)
PHOSPHORUS: 6.4 MG/DL (ref 2.4–4.7)
PLATELET # BLD: 258 THOU/MM3 (ref 130–400)
PMV BLD AUTO: 11.3 FL (ref 9.4–12.4)
POTASSIUM REFLEX MAGNESIUM: 4.7 MEQ/L (ref 3.5–5.2)
POTASSIUM SERPL-SCNC: 5.1 MEQ/L (ref 3.5–5.2)
PREGNANCY, SERUM: NEGATIVE
RBC # BLD: 4.82 MILL/MM3 (ref 4.2–5.4)
SEG NEUTROPHILS: 59.7 %
SEGMENTED NEUTROPHILS ABSOLUTE COUNT: 6.4 THOU/MM3 (ref 1.8–7.7)
SODIUM BLD-SCNC: 142 MEQ/L (ref 135–145)
SODIUM BLD-SCNC: 142 MEQ/L (ref 135–145)
TOTAL PROTEIN: 6.8 G/DL (ref 6.1–8)
TOTAL PROTEIN: 6.8 G/DL (ref 6.1–8)
TSH SERPL DL<=0.05 MIU/L-ACNC: 11.92 UIU/ML (ref 0.4–4.2)
WBC # BLD: 10.7 THOU/MM3 (ref 4.8–10.8)

## 2019-10-13 PROCEDURE — 2580000003 HC RX 258: Performed by: PHYSICIAN ASSISTANT

## 2019-10-13 PROCEDURE — 96365 THER/PROPH/DIAG IV INF INIT: CPT

## 2019-10-13 PROCEDURE — 84703 CHORIONIC GONADOTROPIN ASSAY: CPT

## 2019-10-13 PROCEDURE — 84100 ASSAY OF PHOSPHORUS: CPT

## 2019-10-13 PROCEDURE — 82330 ASSAY OF CALCIUM: CPT

## 2019-10-13 PROCEDURE — 6360000002 HC RX W HCPCS: Performed by: PHYSICIAN ASSISTANT

## 2019-10-13 PROCEDURE — 84439 ASSAY OF FREE THYROXINE: CPT

## 2019-10-13 PROCEDURE — 83970 ASSAY OF PARATHORMONE: CPT

## 2019-10-13 PROCEDURE — 80053 COMPREHEN METABOLIC PANEL: CPT

## 2019-10-13 PROCEDURE — 36415 COLL VENOUS BLD VENIPUNCTURE: CPT

## 2019-10-13 PROCEDURE — 82248 BILIRUBIN DIRECT: CPT

## 2019-10-13 PROCEDURE — 93005 ELECTROCARDIOGRAM TRACING: CPT | Performed by: EMERGENCY MEDICINE

## 2019-10-13 PROCEDURE — 84443 ASSAY THYROID STIM HORMONE: CPT

## 2019-10-13 PROCEDURE — 85025 COMPLETE CBC W/AUTO DIFF WBC: CPT

## 2019-10-13 PROCEDURE — 99284 EMERGENCY DEPT VISIT MOD MDM: CPT

## 2019-10-13 RX ADMIN — CALCIUM GLUCONATE 2 G: 98 INJECTION, SOLUTION INTRAVENOUS at 23:28

## 2019-10-13 ASSESSMENT — ENCOUNTER SYMPTOMS
ABDOMINAL PAIN: 0
CONSTIPATION: 0
SHORTNESS OF BREATH: 0
COLOR CHANGE: 0
VOMITING: 0
NAUSEA: 0
DIARRHEA: 0
VOICE CHANGE: 1

## 2019-10-14 VITALS
OXYGEN SATURATION: 99 % | BODY MASS INDEX: 41.64 KG/M2 | RESPIRATION RATE: 16 BRPM | WEIGHT: 235 LBS | SYSTOLIC BLOOD PRESSURE: 117 MMHG | DIASTOLIC BLOOD PRESSURE: 75 MMHG | TEMPERATURE: 98.4 F | HEART RATE: 85 BPM

## 2019-10-14 LAB
PTH INTACT: 3.9 PG/ML (ref 15–65)
T4 FREE: 1.09 NG/DL (ref 0.93–1.76)

## 2019-10-14 PROCEDURE — 93010 ELECTROCARDIOGRAM REPORT: CPT | Performed by: NUCLEAR MEDICINE

## 2019-10-15 ASSESSMENT — ENCOUNTER SYMPTOMS: PHOTOPHOBIA: 0

## 2020-01-06 ENCOUNTER — HOSPITAL ENCOUNTER (EMERGENCY)
Age: 40
Discharge: HOME | End: 2020-01-06
Attending: EMERGENCY MEDICINE
Payer: COMMERCIAL

## 2020-01-06 VITALS
HEART RATE: 62 BPM | RESPIRATION RATE: 16 BRPM | BODY MASS INDEX: 41.64 KG/M2 | OXYGEN SATURATION: 100 % | DIASTOLIC BLOOD PRESSURE: 63 MMHG | SYSTOLIC BLOOD PRESSURE: 103 MMHG | TEMPERATURE: 97.6 F | WEIGHT: 235 LBS

## 2020-01-06 LAB
ALBUMIN SERPL-MCNC: 4.2 G/DL (ref 3.5–5.1)
ALP BLD-CCNC: 97 U/L (ref 38–126)
ALT SERPL-CCNC: 33 U/L (ref 11–66)
ANION GAP SERPL CALCULATED.3IONS-SCNC: 14 MEQ/L (ref 8–16)
AST SERPL-CCNC: 41 U/L (ref 5–40)
BASOPHILS # BLD: 0.9 %
BASOPHILS ABSOLUTE: 0.1 THOU/MM3 (ref 0–0.1)
BILIRUB SERPL-MCNC: 0.4 MG/DL (ref 0.3–1.2)
BILIRUBIN DIRECT: < 0.2 MG/DL (ref 0–0.3)
BUN BLDV-MCNC: 25 MG/DL (ref 7–22)
CALCIUM IONIZED: 0.97 MMOL/L (ref 1.12–1.32)
CALCIUM SERPL-MCNC: 8.7 MG/DL (ref 8.5–10.5)
CHLORIDE BLD-SCNC: 99 MEQ/L (ref 98–111)
CO2: 26 MEQ/L (ref 23–33)
CREAT SERPL-MCNC: 1.2 MG/DL (ref 0.4–1.2)
EKG ATRIAL RATE: 72 BPM
EKG P AXIS: 55 DEGREES
EKG P-R INTERVAL: 142 MS
EKG Q-T INTERVAL: 424 MS
EKG QRS DURATION: 62 MS
EKG QTC CALCULATION (BAZETT): 464 MS
EKG R AXIS: 48 DEGREES
EKG T AXIS: 46 DEGREES
EKG VENTRICULAR RATE: 72 BPM
EOSINOPHIL # BLD: 3.5 %
EOSINOPHILS ABSOLUTE: 0.3 THOU/MM3 (ref 0–0.4)
ERYTHROCYTE [DISTWIDTH] IN BLOOD BY AUTOMATED COUNT: 14.7 % (ref 11.5–14.5)
ERYTHROCYTE [DISTWIDTH] IN BLOOD BY AUTOMATED COUNT: 49 FL (ref 35–45)
GFR SERPL CREATININE-BSD FRML MDRD: 50 ML/MIN/1.73M2
GLUCOSE BLD-MCNC: 81 MG/DL (ref 70–108)
HCT VFR BLD CALC: 44.3 % (ref 37–47)
HEMOGLOBIN: 14.5 GM/DL (ref 12–16)
IMMATURE GRANS (ABS): 0.02 THOU/MM3 (ref 0–0.07)
IMMATURE GRANULOCYTES: 0.2 %
LYMPHOCYTES # BLD: 42 %
LYMPHOCYTES ABSOLUTE: 4 THOU/MM3 (ref 1–4.8)
MAGNESIUM: 2.1 MG/DL (ref 1.6–2.4)
MCH RBC QN AUTO: 29.8 PG (ref 26–33)
MCHC RBC AUTO-ENTMCNC: 32.7 GM/DL (ref 32.2–35.5)
MCV RBC AUTO: 91.2 FL (ref 81–99)
MONOCYTES # BLD: 3.8 %
MONOCYTES ABSOLUTE: 0.4 THOU/MM3 (ref 0.4–1.3)
NUCLEATED RED BLOOD CELLS: 0 /100 WBC
OSMOLALITY CALCULATION: 281 MOSMOL/KG (ref 275–300)
PLATELET # BLD: 192 THOU/MM3 (ref 130–400)
PMV BLD AUTO: 12.3 FL (ref 9.4–12.4)
POTASSIUM REFLEX MAGNESIUM: 4.4 MEQ/L (ref 3.5–5.2)
RBC # BLD: 4.86 MILL/MM3 (ref 4.2–5.4)
SEG NEUTROPHILS: 49.6 %
SEGMENTED NEUTROPHILS ABSOLUTE COUNT: 4.7 THOU/MM3 (ref 1.8–7.7)
SODIUM BLD-SCNC: 139 MEQ/L (ref 135–145)
TOTAL PROTEIN: 7.3 G/DL (ref 6.1–8)
TSH SERPL DL<=0.05 MIU/L-ACNC: 146.5 UIU/ML (ref 0.4–4.2)
WBC # BLD: 9.5 THOU/MM3 (ref 4.8–10.8)

## 2020-01-06 PROCEDURE — 83735 ASSAY OF MAGNESIUM: CPT

## 2020-01-06 PROCEDURE — 6370000000 HC RX 637 (ALT 250 FOR IP): Performed by: EMERGENCY MEDICINE

## 2020-01-06 PROCEDURE — 80076 HEPATIC FUNCTION PANEL: CPT

## 2020-01-06 PROCEDURE — 80048 BASIC METABOLIC PNL TOTAL CA: CPT

## 2020-01-06 PROCEDURE — 96365 THER/PROPH/DIAG IV INF INIT: CPT

## 2020-01-06 PROCEDURE — 99284 EMERGENCY DEPT VISIT MOD MDM: CPT

## 2020-01-06 PROCEDURE — 36415 COLL VENOUS BLD VENIPUNCTURE: CPT

## 2020-01-06 PROCEDURE — 2580000003 HC RX 258: Performed by: EMERGENCY MEDICINE

## 2020-01-06 PROCEDURE — 93010 ELECTROCARDIOGRAM REPORT: CPT | Performed by: INTERNAL MEDICINE

## 2020-01-06 PROCEDURE — 96375 TX/PRO/DX INJ NEW DRUG ADDON: CPT

## 2020-01-06 PROCEDURE — 84443 ASSAY THYROID STIM HORMONE: CPT

## 2020-01-06 PROCEDURE — 93005 ELECTROCARDIOGRAM TRACING: CPT | Performed by: EMERGENCY MEDICINE

## 2020-01-06 PROCEDURE — 6360000002 HC RX W HCPCS: Performed by: EMERGENCY MEDICINE

## 2020-01-06 PROCEDURE — 85025 COMPLETE CBC W/AUTO DIFF WBC: CPT

## 2020-01-06 PROCEDURE — 82330 ASSAY OF CALCIUM: CPT

## 2020-01-06 RX ORDER — HYDROCODONE BITARTRATE AND ACETAMINOPHEN 5; 325 MG/1; MG/1
1 TABLET ORAL ONCE
Status: COMPLETED | OUTPATIENT
Start: 2020-01-06 | End: 2020-01-06

## 2020-01-06 RX ORDER — PREDNISONE 20 MG/1
20 TABLET ORAL 2 TIMES DAILY
Qty: 10 TABLET | Refills: 0 | Status: SHIPPED | OUTPATIENT
Start: 2020-01-06 | End: 2020-01-11

## 2020-01-06 RX ORDER — HYDROCODONE BITARTRATE AND ACETAMINOPHEN 5; 325 MG/1; MG/1
1 TABLET ORAL EVERY 6 HOURS PRN
Qty: 10 TABLET | Refills: 0 | Status: SHIPPED | OUTPATIENT
Start: 2020-01-06 | End: 2020-01-09

## 2020-01-06 RX ORDER — METHYLPREDNISOLONE SODIUM SUCCINATE 125 MG/2ML
125 INJECTION, POWDER, LYOPHILIZED, FOR SOLUTION INTRAMUSCULAR; INTRAVENOUS ONCE
Status: COMPLETED | OUTPATIENT
Start: 2020-01-06 | End: 2020-01-06

## 2020-01-06 RX ADMIN — CALCIUM GLUCONATE 2 G: 98 INJECTION, SOLUTION INTRAVENOUS at 09:58

## 2020-01-06 RX ADMIN — HYDROCODONE BITARTRATE AND ACETAMINOPHEN 1 TABLET: 5; 325 TABLET ORAL at 09:07

## 2020-01-06 RX ADMIN — METHYLPREDNISOLONE SODIUM SUCCINATE 125 MG: 125 INJECTION, POWDER, FOR SOLUTION INTRAMUSCULAR; INTRAVENOUS at 09:08

## 2020-01-06 ASSESSMENT — ENCOUNTER SYMPTOMS
ABDOMINAL DISTENTION: 0
SHORTNESS OF BREATH: 0
NAUSEA: 0
ABDOMINAL PAIN: 0
VOMITING: 0
CONSTIPATION: 0
DIARRHEA: 0
CHEST TIGHTNESS: 0
WHEEZING: 0
RHINORRHEA: 0
SORE THROAT: 0
BACK PAIN: 0
EYE REDNESS: 0
SINUS PRESSURE: 0

## 2020-01-06 ASSESSMENT — PAIN DESCRIPTION - ORIENTATION: ORIENTATION: RIGHT;LEFT

## 2020-01-06 ASSESSMENT — PAIN DESCRIPTION - LOCATION: LOCATION: HAND

## 2020-01-06 ASSESSMENT — PAIN SCALES - GENERAL
PAINLEVEL_OUTOF10: 2
PAINLEVEL_OUTOF10: 1

## 2020-01-06 ASSESSMENT — PAIN DESCRIPTION - DESCRIPTORS: DESCRIPTORS: NUMBNESS

## 2020-01-06 NOTE — ED PROVIDER NOTES
[address] [place] [address] COMPLAINT       Chief Complaint   Patient presents with    Hand Numbness       Nurses Notes reviewed and I agree except as noted inthe HPI. HISTORY OF PRESENT ILLNESS    Mary Najera is a 44 y.o. female who presents to the Emergency Department for the evaluation of bilateral hand numbness. The patient comes here today from home.  is at bedside. The patient has a history of thyroid disease, hypocalcemia, GERD, and gastric bypass. Patient has had 4 admissions for hypocalcemia within the past 3 to 4 months. Patient has had this numbness in the past associated with this diagnosis, but she states her numbness over the past 3 to 4 weeks has been different. She states the numbness and tingling are typically only present to her fingertips, but this is now radiating up into the hand. The patient has been prescribed Calcitriol and Calcium citrate which she has been taking. These medications were adjusted 1 month ago. 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 Symptoms are worse in the right hand. Patient reports very minimal pain rating her current pain at a 1/10 in severity. She has been taking Aleve at home over the past couple of days. She denies numbness/tingling to any other area. She denies chest pain or SOB. She denies nausea, vomiting, or abdominal pain. She denies headache, vision change, dizziness, or lightheadedness. Patient does not appear to be in any distress. There are no other complaints or symptoms. The HPI was provided by the patient. REVIEW OF SYSTEMS     Review of Systems   Constitutional: Negative for activity change, chills and fever. HENT: Negative for congestion, rhinorrhea, sinus pressure and sore throat. Eyes: Negative for redness and visual disturbance.    Respiratory: Negative for chest tightness, shortness of breath and wheezing. Cardiovascular: Negative for chest pain, palpitations and leg swelling. Gastrointestinal: Negative for abdominal distention, abdominal pain, constipation, diarrhea, nausea and vomiting. Endocrine: Negative for polydipsia and polyuria. Genitourinary: Negative for decreased urine volume, dysuria and urgency. Musculoskeletal: Negative for arthralgias, back pain and myalgias. Skin: Negative for pallor and rash. Neurological: Positive for numbness (1st 4 fingers bilateral hands/fingertips). Negative for dizziness, tremors, weakness, light-headedness and headaches. Hematological: Negative for adenopathy. Does not bruise/bleed easily. Psychiatric/Behavioral: Negative for self-injury and suicidal ideas. PAST MEDICAL HISTORY    has a past medical history of Adjustment disorder, Anxiety, GERD (gastroesophageal reflux disease), Hypothyroidism, Obesity, Seasonal allergies, and Thyroid nodule. SURGICAL HISTORY      has a past surgical history that includes  section (,,); Cholecystectomy (); Hysterectomy (); Tubal ligation (); Bariatric Surgery (11/15/2018); Upper gastrointestinal endoscopy (11/15/2018); other surgical history (11/15/2018); Tonsillectomy; and Thyroidectomy (N/A, 2019).     CURRENT MEDICATIONS       Previous Medications    ARTIFICIAL TEARS 1.4 % OPHTHALMIC SOLUTION    as needed    CALCITRIOL (ROCALTROL) 0.5 MCG CAPSULE    Take 2 capsules by mouth 4 times daily    CALCITRIOL (ROCALTROL) 0.5 MCG CAPSULE    Take 2 capsules by mouth 4 times daily for 7 days    CALCIUM CITRATE (CALCITRATE) 250 MG TABS TABLET    Take 4 tablets by mouth 4 times daily    EQ EYE ITCH RELIEF 0.025 % OPHTHALMIC SOLUTION    as needed    ESTRADIOL (ESTRACE) 2 MG TABLET    Take 1 tablet by mouth daily    FUROSEMIDE (LASIX) 40 MG TABLET    Take 1 tablet by mouth as needed     LEVOTHYROXINE (SYNTHROID) 200 MCG TABLET    Take 200 mcg by mouth Daily    LORATADINE-PSEUDOEPHEDRINE (CLARITIN-D 24 HOUR PO)    Take 1 tablet by mouth daily    MAGNESIUM OXIDE (MAG-OX) 400 (241.3 MG) MG TABS TABLET    Take 1 tablet by mouth daily for 7 days    MULTIPLE VITAMIN (MULTI VITAMIN DAILY PO)    Take by mouth Indications: flinstones    ONDANSETRON (ZOFRAN ODT) 4 MG DISINTEGRATING TABLET    Take 1 tablet by mouth every 8 hours as needed for Nausea    SPIRONOLACTONE (ALDACTONE) 50 MG TABLET    Take 1 tablet by mouth as needed     VITAMIN B-12 (CYANOCOBALAMIN) 500 MCG TABLET    Take 1 tablet by mouth daily    VITAMIN D (ERGOCALCIFEROL) 44732 UNITS CAPSULE    Take 1 capsule by mouth once a week    WHITE PETROLATUM-MINERAL OIL ([employer] EYE LUBRICANT NIGHTTIME) OINT    Apply to eye            is allergic to latex. FAMILY HISTORY     She indicated that her mother is alive. She indicated that her father is alive. She indicated that her sister is alive. She indicated that her brother is alive. She indicated that her maternal grandmother is . She indicated that her maternal grandfather is . She indicated that her paternal grandmother is alive. She indicated that her paternal grandfather is . family history includes Arthritis in her father; Cancer in her paternal grandmother; Diabetes in her father, maternal grandfather, and paternal grandfather; Hypertension in her father; Obesity in her maternal grandmother; Other in her mother. SOCIAL HISTORY      reports that she has been smoking. She has been smoking about 0.50 packs per day. She has never used smokeless tobacco. She reports current alcohol use. She reports that she does not use drugs. PHYSICAL EXAM     INITIAL VITALS:  weight is 235 lb (106.6 kg). Her oral temperature is 97.6 °F (36.4 °C). Her blood pressure is 105/94 (abnormal) and her pulse is 70. Her respiration is 13 and oxygen saturation is 98%. Physical Exam  Vitals signs and nursing note reviewed.    Constitutional:       Appearance: She is well-developed. She is not diaphoretic. HENT:      Head: Normocephalic and atraumatic. Right Ear: External ear normal.      Left Ear: External ear normal.   Eyes:      General: No scleral icterus. Right eye: No discharge. Left eye: No discharge. Conjunctiva/sclera: Conjunctivae normal.   Neck:      Musculoskeletal: Normal range of motion and neck supple. Vascular: No JVD. Cardiovascular:      Rate and Rhythm: Normal rate and regular rhythm. Pulmonary:      Effort: Pulmonary effort is normal. No respiratory distress. Breath sounds: Normal breath sounds. No stridor. Abdominal:      General: There is no distension. Palpations: Abdomen is soft. Musculoskeletal: Normal range of motion. Right wrist: She exhibits normal range of motion. Left wrist: She exhibits normal range of motion. Right hand: She exhibits normal range of motion and normal capillary refill. Decreased sensation (tips of first 4 digits) noted. Left hand: She exhibits normal range of motion and normal capillary refill. Decreased sensation (tips of first 4 digits) noted. Comments: Positive Phalen's and Durkan's test.    Skin:     General: Skin is warm and dry. Findings: No erythema. Neurological:      Mental Status: She is alert and oriented to person, place, and time. Motor: No abnormal muscle tone. Comments: Sensation intact to lower extremities. Psychiatric:         Behavior: Behavior normal.       DIFFERENTIAL DIAGNOSIS:   Hypocalcemia, CPS, paresthesia, unlikely cervical myeopathy    RESULTS     EKG: All EKG's are interpreted by the Emergency Department Physician who either signs or Co-signs this chart in the absence of acardiologist.    Vent. Rate: 72 bpm  MT interval: 142 ms  QRS duration:62 ms  QTc: 464 ms  P-R-T axes: 55, 48, 46  NSR.  No STEMI  Compared to old EKG on 13-Oct-2019    RADIOLOGY: non-plain film images(s) such as CT, Ultrasound and MRI are read by the radiologist.    No orders to display       LABS:   Labs Reviewed   CBC WITH AUTO DIFFERENTIAL - Abnormal; Notable for the following components:       Result Value    RDW-CV 14.7 (*)     RDW-SD 49.0 (*)     All other components within normal limits   BASIC METABOLIC PANEL W/ REFLEX TO MG FOR LOW K - Abnormal; Notable for the following components:    BUN 25 (*)     All other components within normal limits   HEPATIC FUNCTION PANEL - Abnormal; Notable for the following components:    AST 41 (*)     All other components within normal limits   CALCIUM, IONIZED - Abnormal; Notable for the following components:    Calcium, Ion 0.97 (*)     All other components within normal limits   TSH WITHOUT REFLEX - Abnormal; Notable for the following components:    .500 (*)     All other components within normal limits   GLOMERULAR FILTRATION RATE, ESTIMATED - Abnormal; Notable for the following components:    Est, Glom Filt Rate 50 (*)     All other components within normal limits   MAGNESIUM   ANION GAP   OSMOLALITY       EMERGENCY DEPARTMENT COURSE:   Vitals:    Vitals:    01/06/20 0747 01/06/20 0749 01/06/20 0910   BP:  (!) 134/96 (!) 105/94   Pulse:  75 70   Resp:  16 13   Temp:  97.6 °F (36.4 °C)    TempSrc: Oral Oral    SpO2:  96% 98%   Weight:  235 lb (106.6 kg)      0751 Labs ordered    0847 Solu-medrol and Norco ordered    MDM  Recurrent hypocalcemia - - calcium mildly low at 0.97- replaced with 2 gm IVP  Progressive volar fingertip paresthesia worse at night and with repetitive motion   Consistent with CTS bilateral  Remaining blood work appears benign. Reviewed strict return precautions, All questions and concerned were addressed. CRITICAL CARE:   None     CONSULTS:  None     PROCEDURES:  None    FINAL IMPRESSION      1. Bilateral carpal tunnel syndrome    2.  Hypocalcemia          DISPOSITION/PLAN   Discharge     PATIENT REFERRED TO:  Roxann Almazan [address] [place] [address] [address] [facility] [phone]  Call in 2 days  to schedule an appointment in the next week    Sajan Scott, APRN - [address] [place] [address]    Schedule an appointment as soon as possible for a visit in 3 days        DISCHARGE MEDICATIONS:  New Prescriptions    HYDROCODONE-ACETAMINOPHEN (NORCO) 5-325 MG PER TABLET    Take 1 tablet by mouth every 6 hours as needed for Pain for up to 3 days. Intended supply: 3 days. Take lowest dose possible to manage pain    PREDNISONE (DELTASONE) 20 MG TABLET    Take 1 tablet by mouth 2 times daily for 5 days       (Please note that portions of this note were completed with a voice recognition program.Efforts were made to edit the dictations but occasionally words are mis-transcribed.)    Scribe:  Signed by: Elo Rodriguez, 1/6/208:04 AM Scribing for and in the presence of Levi Barger DO    Provider:  I personally performed the services described in the documentation, reviewed and edited the documentation which was dictated to the scribe in mypresence, and it accurately records my words and actions.     Levi Barger DO 1/6/20 10:58 AM                      Levi Barger DO  01/06/20 6852

## 2020-01-06 NOTE — ED TRIAGE NOTES
Presents to ED with c/o bilateral hand and numbness to bilateral hands that started 3 weeks ago. Reports pain radiates into elbow. Pain and numbness worse in right hand. States she has had problems with low calcium. States the pain radiating up her arm is new. Alert and oriented.

## 2020-02-05 ENCOUNTER — HOSPITAL ENCOUNTER (EMERGENCY)
Age: 40
Discharge: HOME | End: 2020-02-05
Attending: EMERGENCY MEDICINE
Payer: COMMERCIAL

## 2020-02-05 VITALS
TEMPERATURE: 98 F | HEIGHT: 63 IN | WEIGHT: 225 LBS | BODY MASS INDEX: 39.87 KG/M2 | RESPIRATION RATE: 18 BRPM | HEART RATE: 86 BPM | OXYGEN SATURATION: 97 % | DIASTOLIC BLOOD PRESSURE: 76 MMHG | SYSTOLIC BLOOD PRESSURE: 108 MMHG

## 2020-02-05 LAB
ALBUMIN SERPL-MCNC: 4.4 G/DL (ref 3.5–5.1)
ALP BLD-CCNC: 117 U/L (ref 38–126)
ALT SERPL-CCNC: 20 U/L (ref 11–66)
ANION GAP SERPL CALCULATED.3IONS-SCNC: 17 MEQ/L (ref 8–16)
AST SERPL-CCNC: 30 U/L (ref 5–40)
BASOPHILS # BLD: 1 %
BASOPHILS ABSOLUTE: 0.1 THOU/MM3 (ref 0–0.1)
BILIRUB SERPL-MCNC: 0.4 MG/DL (ref 0.3–1.2)
BILIRUBIN DIRECT: < 0.2 MG/DL (ref 0–0.3)
BUN BLDV-MCNC: 22 MG/DL (ref 7–22)
CALCIUM IONIZED: 0.92 MMOL/L (ref 1.12–1.32)
CALCIUM SERPL-MCNC: 8 MG/DL (ref 8.5–10.5)
CHLORIDE BLD-SCNC: 99 MEQ/L (ref 98–111)
CO2: 25 MEQ/L (ref 23–33)
CREAT SERPL-MCNC: 1.2 MG/DL (ref 0.4–1.2)
EOSINOPHIL # BLD: 1.8 %
EOSINOPHILS ABSOLUTE: 0.2 THOU/MM3 (ref 0–0.4)
ERYTHROCYTE [DISTWIDTH] IN BLOOD BY AUTOMATED COUNT: 14.6 % (ref 11.5–14.5)
ERYTHROCYTE [DISTWIDTH] IN BLOOD BY AUTOMATED COUNT: 47.9 FL (ref 35–45)
GFR SERPL CREATININE-BSD FRML MDRD: 50 ML/MIN/1.73M2
GLUCOSE BLD-MCNC: 105 MG/DL (ref 70–108)
HCT VFR BLD CALC: 45.6 % (ref 37–47)
HEMOGLOBIN: 14.9 GM/DL (ref 12–16)
IMMATURE GRANS (ABS): 0.04 THOU/MM3 (ref 0–0.07)
IMMATURE GRANULOCYTES: 0.5 %
LYMPHOCYTES # BLD: 36.6 %
LYMPHOCYTES ABSOLUTE: 3.3 THOU/MM3 (ref 1–4.8)
MAGNESIUM: 1.9 MG/DL (ref 1.6–2.4)
MCH RBC QN AUTO: 29.3 PG (ref 26–33)
MCHC RBC AUTO-ENTMCNC: 32.7 GM/DL (ref 32.2–35.5)
MCV RBC AUTO: 89.8 FL (ref 81–99)
MONOCYTES # BLD: 5.4 %
MONOCYTES ABSOLUTE: 0.5 THOU/MM3 (ref 0.4–1.3)
NUCLEATED RED BLOOD CELLS: 0 /100 WBC
OSMOLALITY CALCULATION: 285 MOSMOL/KG (ref 275–300)
PHOSPHORUS: 6.1 MG/DL (ref 2.4–4.7)
PLATELET # BLD: 338 THOU/MM3 (ref 130–400)
PMV BLD AUTO: 10.9 FL (ref 9.4–12.4)
POTASSIUM SERPL-SCNC: 4.4 MEQ/L (ref 3.5–5.2)
PTH INTACT: 6 PG/ML (ref 15–65)
RBC # BLD: 5.08 MILL/MM3 (ref 4.2–5.4)
SEG NEUTROPHILS: 54.7 %
SEGMENTED NEUTROPHILS ABSOLUTE COUNT: 4.9 THOU/MM3 (ref 1.8–7.7)
SODIUM BLD-SCNC: 141 MEQ/L (ref 135–145)
TOTAL PROTEIN: 7.8 G/DL (ref 6.1–8)
TROPONIN T: < 0.01 NG/ML
TSH SERPL DL<=0.05 MIU/L-ACNC: 117.5 UIU/ML (ref 0.4–4.2)
WBC # BLD: 8.9 THOU/MM3 (ref 4.8–10.8)

## 2020-02-05 PROCEDURE — 6360000002 HC RX W HCPCS: Performed by: EMERGENCY MEDICINE

## 2020-02-05 PROCEDURE — 84443 ASSAY THYROID STIM HORMONE: CPT

## 2020-02-05 PROCEDURE — 84484 ASSAY OF TROPONIN QUANT: CPT

## 2020-02-05 PROCEDURE — 36415 COLL VENOUS BLD VENIPUNCTURE: CPT

## 2020-02-05 PROCEDURE — 83970 ASSAY OF PARATHORMONE: CPT

## 2020-02-05 PROCEDURE — 84100 ASSAY OF PHOSPHORUS: CPT

## 2020-02-05 PROCEDURE — 82330 ASSAY OF CALCIUM: CPT

## 2020-02-05 PROCEDURE — 99285 EMERGENCY DEPT VISIT HI MDM: CPT

## 2020-02-05 PROCEDURE — 82248 BILIRUBIN DIRECT: CPT

## 2020-02-05 PROCEDURE — 80053 COMPREHEN METABOLIC PANEL: CPT

## 2020-02-05 PROCEDURE — 96365 THER/PROPH/DIAG IV INF INIT: CPT

## 2020-02-05 PROCEDURE — 2580000003 HC RX 258: Performed by: EMERGENCY MEDICINE

## 2020-02-05 PROCEDURE — 85025 COMPLETE CBC W/AUTO DIFF WBC: CPT

## 2020-02-05 PROCEDURE — 83735 ASSAY OF MAGNESIUM: CPT

## 2020-02-05 RX ADMIN — CALCIUM GLUCONATE 1 G: 98 INJECTION, SOLUTION INTRAVENOUS at 14:44

## 2020-02-05 ASSESSMENT — ENCOUNTER SYMPTOMS
ABDOMINAL PAIN: 0
SHORTNESS OF BREATH: 0
DIARRHEA: 0
COUGH: 0
CONSTIPATION: 0
VOMITING: 0
SORE THROAT: 0
RHINORRHEA: 0
BLOOD IN STOOL: 0
NAUSEA: 0

## 2020-02-05 NOTE — ED PROVIDER NOTES
[address] [facility] COMPLAINT    Chief Complaint   Patient presents with    Numbness       Nurses Notes reviewed and I agree except as noted in the HPI. HPI    Mary Najera is a 44 y.o. female who presents for evaluation of numbness to her digits and a generalized heaviness onset today, currently in no pain. Patient reports a past surgical history of Thyroidectomy on 9/19/19, and states these symptoms are typical when her calcium levels are low. Patient reports several hospitalizations since her Thyroidectomy where she received transfusions. She states her last transfusion occurred one month ago, and states that her transfusions are occurring with more length of time in between them. Patient reports compliance with all medications and vitamins. She denies chest pain and shortness of breath. Patient has a past medical history of GERD, anxiety, seasonal allergies and adjustment disorders. There are no other complaints, symptoms, or concerns on initial encounter. HPI provided by the patient. REVIEW OF SYSTEMS    Review of Systems   Constitutional: Negative for chills, fatigue and fever. HENT: Negative for rhinorrhea and sore throat. Eyes: Negative for visual disturbance. Respiratory: Negative for cough and shortness of breath. Cardiovascular: Negative for chest pain. Gastrointestinal: Negative for abdominal pain, blood in stool, constipation, diarrhea, nausea and vomiting. Genitourinary: Negative for dysuria and hematuria. Musculoskeletal: Negative for arthralgias. Skin: Negative for rash. Neurological: Positive for numbness (fingers). Negative for dizziness, weakness, light-headedness and headaches.         Heaviness          PAST MEDICAL HISTORY     has a past medical history of Adjustment disorder, Anxiety, GERD (gastroesophageal reflux disease), Hypothyroidism, Obesity, Seasonal allergies, and Thyroid nodule. SURGICAL HISTORY   has a past surgical history that includes  section (,,); Cholecystectomy (); Hysterectomy (); Tubal ligation (); Bariatric Surgery (11/15/2018); Upper gastrointestinal endoscopy (11/15/2018); other surgical history (11/15/2018); Tonsillectomy; and Thyroidectomy (N/A, 2019). CURRENT MEDICATIONS    Previous Medications    ARTIFICIAL TEARS 1.4 % OPHTHALMIC SOLUTION    as needed    CALCITRIOL (ROCALTROL) 0.5 MCG CAPSULE    Take 2 capsules by mouth 4 times daily    CALCITRIOL (ROCALTROL) 0.5 MCG CAPSULE    Take 2 capsules by mouth 4 times daily for 7 days    CALCIUM CITRATE (CALCITRATE) 250 MG TABS TABLET    Take 4 tablets by mouth 4 times daily    EQ EYE ITCH RELIEF 0.025 % OPHTHALMIC SOLUTION    as needed    ESTRADIOL (ESTRACE) 2 MG TABLET    Take 1 tablet by mouth daily    FUROSEMIDE (LASIX) 40 MG TABLET    Take 1 tablet by mouth as needed     LEVOTHYROXINE (SYNTHROID) 200 MCG TABLET    Take 200 mcg by mouth Daily    LORATADINE-PSEUDOEPHEDRINE (CLARITIN-D 24 HOUR PO)    Take 1 tablet by mouth daily    MAGNESIUM OXIDE (MAG-OX) 400 (241.3 MG) MG TABS TABLET    Take 1 tablet by mouth daily for 7 days    MULTIPLE VITAMIN (MULTI VITAMIN DAILY PO)    Take by mouth Indications: flinstones    ONDANSETRON (ZOFRAN ODT) 4 MG DISINTEGRATING TABLET    Take 1 tablet by mouth every 8 hours as needed for Nausea    SPIRONOLACTONE (ALDACTONE) 50 MG TABLET    Take 1 tablet by mouth as needed     VITAMIN B-12 (CYANOCOBALAMIN) 500 MCG TABLET    Take 1 tablet by mouth daily    VITAMIN D (ERGOCALCIFEROL) 56205 UNITS CAPSULE    Take 1 capsule by mouth once a week    WHITE PETROLATUM-MINERAL OIL ([employer] EYE LUBRICANT NIGHTTIME) OINT    Apply to eye       ALLERGIES    is allergic to latex. FAMILY HISTORY    She indicated that her mother is alive. She indicated that her father is alive. She indicated that her sister is alive.  She indicated that her brother is alive. She indicated that her maternal grandmother is . She indicated that her maternal grandfather is . She indicated that her paternal grandmother is alive. She indicated that her paternal grandfather is . family history includes Arthritis in her father; Cancer in her paternal grandmother; Diabetes in her father, maternal grandfather, and paternal grandfather; Hypertension in her father; Obesity in her maternal grandmother; Other in her mother. SOCIAL HISTORY     reports that she has been smoking. She has been smoking about 0.50 packs per day. She has never used smokeless tobacco. She reports current alcohol use. She reports that she does not use drugs. PHYSICAL EXAM      INITIAL VITALS: /76   Pulse 86   Temp 98 °F (36.7 °C) (Oral)   Resp 18   Ht 5' 3\" (1.6 m)   Wt 225 lb (102.1 kg)   SpO2 97%   BMI 39.86 kg/m² Estimated body mass index is 39.86 kg/m² as calculated from the following:    Height as of this encounter: 5' 3\" (1.6 m). Weight as of this encounter: 225 lb (102.1 kg). Physical Exam  Vitals signs reviewed. Constitutional:       Appearance: She is well-developed. HENT:      Head: Normocephalic and atraumatic. Right Ear: External ear normal.      Left Ear: External ear normal.      Nose: Nose normal.   Eyes:      General: No scleral icterus. Conjunctiva/sclera: Conjunctivae normal.      Pupils: Pupils are equal, round, and reactive to light. Neck:      Musculoskeletal: Normal range of motion and neck supple. Thyroid: No thyromegaly. Vascular: No JVD. Cardiovascular:      Rate and Rhythm: Normal rate and regular rhythm. Heart sounds: No murmur. No friction rub. Pulmonary:      Effort: Pulmonary effort is normal.      Breath sounds: Normal breath sounds. No wheezing or rales. Chest:      Chest wall: No tenderness. Abdominal:      General: Bowel sounds are normal.      Palpations: Abdomen is soft. There is no mass. Tenderness: There is no abdominal tenderness. Lymphadenopathy:      Cervical: No cervical adenopathy. Skin:     Findings: No rash. Neurological:      Mental Status: She is alert and oriented to person, place, and time. Psychiatric:         Behavior: Behavior is cooperative.          MEDICAL DECISION MAKING    DIFFERENTIAL DIAGNOSIS:  Hypocalcemia, hypomagnesia, MS or hypokalemia         DIAGNOSTIC RESULTS    RADIOLOGY:    No orders to display       LABS:   Labs Reviewed   CBC WITH AUTO DIFFERENTIAL - Abnormal; Notable for the following components:       Result Value    RDW-CV 14.6 (*)     RDW-SD 47.9 (*)     All other components within normal limits   BASIC METABOLIC PANEL - Abnormal; Notable for the following components:    Calcium 8.0 (*)     All other components within normal limits   CALCIUM, IONIZED - Abnormal; Notable for the following components:    Calcium, Ion 0.92 (*)     All other components within normal limits   PHOSPHORUS - Abnormal; Notable for the following components:    Phosphorus 6.1 (*)     All other components within normal limits   PTH, INTACT - Abnormal; Notable for the following components:    Pth Intact 6.0 (*)     All other components within normal limits   TSH WITHOUT REFLEX - Abnormal; Notable for the following components:    .500 (*)     All other components within normal limits   ANION GAP - Abnormal; Notable for the following components:    Anion Gap 17.0 (*)     All other components within normal limits   GLOMERULAR FILTRATION RATE, ESTIMATED - Abnormal; Notable for the following components:    Est, Glom Filt Rate 50 (*)     All other components within normal limits   HEPATIC FUNCTION PANEL   TROPONIN   MAGNESIUM   OSMOLALITY     All other unresulted laboratory test above are normal:    Vitals:    Vitals:    02/05/20 1327 02/05/20 1418 02/05/20 1444 02/05/20 1553   BP: 95/68 100/61 115/78 108/76   Pulse: 95 81 80 86   Resp: 18 18 18 18   Temp: 98 °F (36.7 °C) TempSrc: Oral      SpO2: 97% 95% 95% 97%   Weight: 225 lb (102.1 kg)      Height: 5' 3\" (1.6 m)          EMERGENCY DEPARTMENT COURSE:    Medications   calcium gluconate 1 g in dextrose 5 % 100 mL IVPB (0 g Intravenous Stopped 2/5/20 1553)       The pt was seen and evaluated by me. Within the department, I observed the pt's vitalsigns to be within acceptable range. Laboratory studies were performed, results were reviewed with the patient. Within the department, the pt was treated with calcium gluconate IV patient was reevaluated and the patient numbness paresthesias resolved. I observed the pt's condition to be hemodynamically stable during the duration of their stay. I explained my proposed course of treatment to the pt, and they were amenable to my decision. They were discharged home, and they will return to the ED if their symptoms become more severein nature, or otherwise change. CRITICAL CARE:   None. CONSULTS:  None    PROCEDURES:  None. FINAL IMPRESSION       1. Hypocalcemia    2. Hypoparathyroidism, unspecified hypoparathyroidism type [facility])          DISPOSITION/PLAN  PATIENT REFERRED TO:  Shawn Dumont MD  [facility]  [phone]    Schedule an appointment as soon as possible for a visit in 5 days      [address] [place] [id number] EMERGENCY DEPT  [address]    As needed    EBEN Arellano - CNP  [address] [place] [address]    Schedule an appointment as soon as possible for a visit in 5 days      DISCHARGE MEDICATIONS:  New Prescriptions    No medications on file       (Please note that portions of this note were completed with a voice recognition program.  Efforts were made to edit the dictations but occasionallywords are mis-transcribed.)    Scribe:  Ada Be 02/05/20 1:40 PM Scribing for and in the presence of Kelli Crandall M.D.     Signed by: Elo Pizano, 02/05/20 4:23 PM    Provider:  PIERO personally performed the services described in the documentation, reviewed and edited the documentation which was dictated to the scribe in my presence, and it accurately records my words andactions.      02/05/20 4:23 PM      Sarmad Caballero MD      Emergency room physician              Sarmad Caballero MD  02/05/20 1080

## 2020-02-05 NOTE — LETTER
[facility] [address] [address] EMERGENCY DEPT  [address] [place] [id number]  Phone: [phone]               February 5, 2020    Patient: Dilip Portillo   YOB: 1980   Date of Visit: 2/5/2020       To Whom It May Concern:    Norma Lepe was seen and treated in our emergency department on 2/5/2020. She may return to school on 02/06/2020.       Sincerely,       Samantha Rush      Signature:__________________________________

## 2020-02-13 ENCOUNTER — PROCEDURE VISIT (OUTPATIENT)
Dept: NEUROLOGY | Age: 40
End: 2020-02-13
Payer: COMMERCIAL

## 2020-02-13 PROCEDURE — 95911 NRV CNDJ TEST 9-10 STUDIES: CPT | Performed by: PSYCHIATRY & NEUROLOGY

## 2020-02-13 PROCEDURE — 95886 MUSC TEST DONE W/N TEST COMP: CPT | Performed by: PSYCHIATRY & NEUROLOGY

## 2020-03-16 ENCOUNTER — HOSPITAL ENCOUNTER (EMERGENCY)
Age: 40
Discharge: HOME | End: 2020-03-17
Payer: COMMERCIAL

## 2020-03-16 VITALS
HEIGHT: 63 IN | BODY MASS INDEX: 43.41 KG/M2 | TEMPERATURE: 97.4 F | WEIGHT: 245 LBS | RESPIRATION RATE: 18 BRPM | DIASTOLIC BLOOD PRESSURE: 71 MMHG | OXYGEN SATURATION: 100 % | SYSTOLIC BLOOD PRESSURE: 114 MMHG | HEART RATE: 83 BPM

## 2020-03-16 LAB
ALBUMIN SERPL-MCNC: 4.4 G/DL (ref 3.5–5.1)
ALP BLD-CCNC: 114 U/L (ref 38–126)
ALT SERPL-CCNC: 48 U/L (ref 11–66)
ANION GAP SERPL CALCULATED.3IONS-SCNC: 17 MEQ/L (ref 8–16)
AST SERPL-CCNC: 50 U/L (ref 5–40)
BASOPHILS # BLD: 0.9 %
BASOPHILS ABSOLUTE: 0.1 THOU/MM3 (ref 0–0.1)
BILIRUB SERPL-MCNC: 0.4 MG/DL (ref 0.3–1.2)
BILIRUBIN DIRECT: < 0.2 MG/DL (ref 0–0.3)
BUN BLDV-MCNC: 23 MG/DL (ref 7–22)
CALCIUM IONIZED: 0.92 MMOL/L (ref 1.12–1.32)
CALCIUM SERPL-MCNC: 7.8 MG/DL (ref 8.5–10.5)
CHLORIDE BLD-SCNC: 98 MEQ/L (ref 98–111)
CO2: 23 MEQ/L (ref 23–33)
CREAT SERPL-MCNC: 1.2 MG/DL (ref 0.4–1.2)
EKG ATRIAL RATE: 53 BPM
EKG P AXIS: 50 DEGREES
EKG P-R INTERVAL: 148 MS
EKG Q-T INTERVAL: 474 MS
EKG QRS DURATION: 68 MS
EKG QTC CALCULATION (BAZETT): 444 MS
EKG R AXIS: 105 DEGREES
EKG T AXIS: -9 DEGREES
EKG VENTRICULAR RATE: 53 BPM
EOSINOPHIL # BLD: 2.9 %
EOSINOPHILS ABSOLUTE: 0.2 THOU/MM3 (ref 0–0.4)
ERYTHROCYTE [DISTWIDTH] IN BLOOD BY AUTOMATED COUNT: 14.8 % (ref 11.5–14.5)
ERYTHROCYTE [DISTWIDTH] IN BLOOD BY AUTOMATED COUNT: 50.2 FL (ref 35–45)
GFR SERPL CREATININE-BSD FRML MDRD: 50 ML/MIN/1.73M2
GLUCOSE BLD-MCNC: 87 MG/DL (ref 70–108)
HCT VFR BLD CALC: 40.5 % (ref 37–47)
HEMOGLOBIN: 13.1 GM/DL (ref 12–16)
IMMATURE GRANS (ABS): 0.02 THOU/MM3 (ref 0–0.07)
IMMATURE GRANULOCYTES: 0.3 %
LYMPHOCYTES # BLD: 41.9 %
LYMPHOCYTES ABSOLUTE: 3.2 THOU/MM3 (ref 1–4.8)
MAGNESIUM: 1.9 MG/DL (ref 1.6–2.4)
MCH RBC QN AUTO: 29.7 PG (ref 26–33)
MCHC RBC AUTO-ENTMCNC: 32.3 GM/DL (ref 32.2–35.5)
MCV RBC AUTO: 91.8 FL (ref 81–99)
MONOCYTES # BLD: 5 %
MONOCYTES ABSOLUTE: 0.4 THOU/MM3 (ref 0.4–1.3)
NUCLEATED RED BLOOD CELLS: 0 /100 WBC
OSMOLALITY CALCULATION: 278.7 MOSMOL/KG (ref 275–300)
PHOSPHORUS: 6.2 MG/DL (ref 2.4–4.7)
PLATELET # BLD: 200 THOU/MM3 (ref 130–400)
PMV BLD AUTO: 12.7 FL (ref 9.4–12.4)
POTASSIUM SERPL-SCNC: 4.2 MEQ/L (ref 3.5–5.2)
RBC # BLD: 4.41 MILL/MM3 (ref 4.2–5.4)
SEG NEUTROPHILS: 49 %
SEGMENTED NEUTROPHILS ABSOLUTE COUNT: 3.7 THOU/MM3 (ref 1.8–7.7)
SODIUM BLD-SCNC: 138 MEQ/L (ref 135–145)
TOTAL PROTEIN: 7.5 G/DL (ref 6.1–8)
TROPONIN T: < 0.01 NG/ML
TSH SERPL DL<=0.05 MIU/L-ACNC: 119.7 UIU/ML (ref 0.4–4.2)
WBC # BLD: 7.6 THOU/MM3 (ref 4.8–10.8)

## 2020-03-16 PROCEDURE — 96365 THER/PROPH/DIAG IV INF INIT: CPT

## 2020-03-16 PROCEDURE — 83735 ASSAY OF MAGNESIUM: CPT

## 2020-03-16 PROCEDURE — 84484 ASSAY OF TROPONIN QUANT: CPT

## 2020-03-16 PROCEDURE — 6360000002 HC RX W HCPCS: Performed by: PHYSICIAN ASSISTANT

## 2020-03-16 PROCEDURE — 80076 HEPATIC FUNCTION PANEL: CPT

## 2020-03-16 PROCEDURE — 36415 COLL VENOUS BLD VENIPUNCTURE: CPT

## 2020-03-16 PROCEDURE — 82330 ASSAY OF CALCIUM: CPT

## 2020-03-16 PROCEDURE — 80048 BASIC METABOLIC PNL TOTAL CA: CPT

## 2020-03-16 PROCEDURE — 93005 ELECTROCARDIOGRAM TRACING: CPT | Performed by: PHYSICIAN ASSISTANT

## 2020-03-16 PROCEDURE — 84443 ASSAY THYROID STIM HORMONE: CPT

## 2020-03-16 PROCEDURE — 83970 ASSAY OF PARATHORMONE: CPT

## 2020-03-16 PROCEDURE — 99283 EMERGENCY DEPT VISIT LOW MDM: CPT

## 2020-03-16 PROCEDURE — 85025 COMPLETE CBC W/AUTO DIFF WBC: CPT

## 2020-03-16 PROCEDURE — 2580000003 HC RX 258: Performed by: PHYSICIAN ASSISTANT

## 2020-03-16 PROCEDURE — 84100 ASSAY OF PHOSPHORUS: CPT

## 2020-03-16 PROCEDURE — 84439 ASSAY OF FREE THYROXINE: CPT

## 2020-03-16 RX ADMIN — CALCIUM GLUCONATE 1 G: 98 INJECTION, SOLUTION INTRAVENOUS at 23:08

## 2020-03-16 ASSESSMENT — ENCOUNTER SYMPTOMS
ABDOMINAL PAIN: 0
DIARRHEA: 0
BLOOD IN STOOL: 0
TROUBLE SWALLOWING: 0
CHEST TIGHTNESS: 0
NAUSEA: 0
SHORTNESS OF BREATH: 0
COUGH: 0
VOMITING: 0
VOICE CHANGE: 0
SORE THROAT: 0
RHINORRHEA: 0
BACK PAIN: 0

## 2020-03-17 LAB
PTH INTACT: 7.4 PG/ML (ref 15–65)
T4 FREE: < 0.1 NG/DL (ref 0.93–1.76)

## 2020-03-17 PROCEDURE — 93010 ELECTROCARDIOGRAM REPORT: CPT | Performed by: INTERNAL MEDICINE

## 2020-03-17 NOTE — ED PROVIDER NOTES
[facility] Emergency [address] [facility]       Chief Complaint   Patient presents with    Numbness     hx of low calcium       Nurses Notes reviewed and I agree except as noted in the HPI. HISTORY OF PRESENT ILLNESS    Verónica Garcia is a 44 y.o. female who presents to the ED with concern of hypocalcemia which she has a history of. Patient has a history of gastric bypass in 2018 and patient also underwent a total thyroidectomy in September of 2019 performed by Dr. Hollie Castorena. The patient claims to be compliant with her calcium supplements. She was most recently here for this on 02/05. The patient was treated appropriately at that time and was discharged home in stable condition. Patient woke up this morning with tingling to her fingertips. She took her calcium supplements as prescribed and proceeded on with her day going to work. Around 1400, she developed numbness of the fingertips and heaviness of both arms. She continued working through this, but by 1800 she had the same symptoms to the legs/feet. Patient did not feel comfortable finishing out her shift and was released to come here for evaluation. She reports taking 1 calcium chewable this evening before coming in. She denies dizziness or lightheadedness. She denies chest pain or SOB. She denies nausea, vomiting, diarrhea, or abdominal pain. The patient has full ROM of extremities, hands, and feet. She does not appear to be in any distress. There are no other complaints, symptoms, or concerns. REVIEW OF SYSTEMS     Review of Systems   Constitutional: Negative for chills, diaphoresis and fever. HENT: Negative for congestion, ear pain, rhinorrhea, sore throat, trouble swallowing and voice change. Eyes: Negative for visual disturbance. Respiratory: Negative for cough, chest tightness and shortness of breath. Cardiovascular: Negative for chest pain and leg swelling.    Gastrointestinal: Negative for abdominal pain, blood in stool, diarrhea, nausea and vomiting. Genitourinary: Negative for dysuria, frequency and hematuria. Musculoskeletal: Negative for back pain and neck pain. Skin: Negative for rash and wound. Neurological: Positive for numbness (fingertips and toes). Negative for dizziness, speech difficulty, weakness and headaches. Psychiatric/Behavioral: Negative for confusion. PAST MEDICAL HISTORY    has a past medical history of Adjustment disorder, Anxiety, GERD (gastroesophageal reflux disease), Hypothyroidism, Obesity, Seasonal allergies, and Thyroid nodule. SURGICAL HISTORY      has a past surgical history that includes  section (,,); Cholecystectomy (); Hysterectomy (); Tubal ligation (); Bariatric Surgery (11/15/2018); Upper gastrointestinal endoscopy (11/15/2018); other surgical history (11/15/2018); Tonsillectomy; and Thyroidectomy (N/A, 2019).     CURRENT MEDICATIONS       Previous Medications    ARTIFICIAL TEARS 1.4 % OPHTHALMIC SOLUTION    as needed    CALCITRIOL (ROCALTROL) 0.5 MCG CAPSULE    Take 2 capsules by mouth 4 times daily    CALCITRIOL (ROCALTROL) 0.5 MCG CAPSULE    Take 2 capsules by mouth 4 times daily for 7 days    CALCIUM CITRATE (CALCITRATE) 250 MG TABS TABLET    Take 4 tablets by mouth 4 times daily    EQ EYE ITCH RELIEF 0.025 % OPHTHALMIC SOLUTION    as needed    ESTRADIOL (ESTRACE) 2 MG TABLET    Take 1 tablet by mouth daily    FUROSEMIDE (LASIX) 40 MG TABLET    Take 1 tablet by mouth as needed     LEVOTHYROXINE (SYNTHROID) 200 MCG TABLET    Take 200 mcg by mouth Daily    LORATADINE-PSEUDOEPHEDRINE (CLARITIN-D 24 HOUR PO)    Take 1 tablet by mouth daily    MAGNESIUM OXIDE (MAG-OX) 400 (241.3 MG) MG TABS TABLET    Take 1 tablet by mouth daily for 7 days    MULTIPLE VITAMIN (MULTI VITAMIN DAILY PO)    Take by mouth Indications: flinstones    ONDANSETRON (ZOFRAN ODT) 4 MG DISINTEGRATING TABLET    Take 1 tablet by mouth every 8 hours as needed for Nausea SPIRONOLACTONE (ALDACTONE) 50 MG TABLET    Take 1 tablet by mouth as needed     VITAMIN B-12 (CYANOCOBALAMIN) 500 MCG TABLET    Take 1 tablet by mouth daily    VITAMIN D (ERGOCALCIFEROL) 72868 UNITS CAPSULE    Take 1 capsule by mouth once a week    WHITE PETROLATUM-MINERAL OIL ([employer] EYE LUBRICANT NIGHTTIME) OINT    Apply to eye            is allergic to latex. FAMILY HISTORY     She indicated that her mother is alive. She indicated that her father is alive. She indicated that her sister is alive. She indicated that her brother is alive. She indicated that her maternal grandmother is . She indicated that her maternal grandfather is . She indicated that her paternal grandmother is alive. She indicated that her paternal grandfather is . family history includes Arthritis in her father; Cancer in her paternal grandmother; Diabetes in her father, maternal grandfather, and paternal grandfather; Hypertension in her father; Obesity in her maternal grandmother; Other in her mother. SOCIAL HISTORY      reports that she has been smoking. She has been smoking about 0.50 packs per day. She has never used smokeless tobacco. She reports current alcohol use. She reports that she does not use drugs. PHYSICAL EXAM     INITIAL VITALS:  height is 5' 3\" (1.6 m) and weight is 245 lb (111.1 kg). Her oral temperature is 97.4 °F (36.3 °C). Her blood pressure is 114/71 and her pulse is 83. Her respiration is 18 and oxygen saturation is 100%. Physical Exam  Vitals signs and nursing note reviewed. Constitutional:       General: She is not in acute distress. Appearance: She is well-developed. She is not toxic-appearing or diaphoretic. HENT:      Head: Normocephalic and atraumatic. Right Ear: Hearing normal.      Left Ear: Hearing normal.      Nose: Nose normal. No rhinorrhea. Mouth/Throat:      Pharynx: Uvula midline. No oropharyngeal exudate.    Eyes:      General: Lids are normal. No SURJIT  03/16/20 7232

## 2020-03-17 NOTE — ED NOTES
Pt resting quietly in room no needs expressed. Side rails up x2 with call light in reach. Will continue to monitor. Patient given oral hydration and nutrition. Denies other needs. Call light within reach.         Sharon Claudio RN  03/16/20 2420

## 2020-03-20 ENCOUNTER — HOSPITAL ENCOUNTER (OUTPATIENT)
Age: 40
Discharge: HOME | End: 2020-03-20
Payer: COMMERCIAL

## 2020-03-20 LAB — MRSA SCREEN RT-PCR: POSITIVE

## 2020-03-20 PROCEDURE — 87641 MR-STAPH DNA AMP PROBE: CPT

## 2020-05-15 ENCOUNTER — HOSPITAL ENCOUNTER (OUTPATIENT)
Age: 40
Discharge: HOME | End: 2020-05-15
Payer: COMMERCIAL

## 2020-05-15 PROCEDURE — 87081 CULTURE SCREEN ONLY: CPT

## 2020-05-17 LAB
MRSA SCREEN: ABNORMAL
ORGANISM: ABNORMAL

## 2020-06-04 ENCOUNTER — HOSPITAL ENCOUNTER (EMERGENCY)
Age: 40
Discharge: HOME | End: 2020-06-04
Attending: EMERGENCY MEDICINE
Payer: COMMERCIAL

## 2020-06-04 VITALS
WEIGHT: 245 LBS | OXYGEN SATURATION: 96 % | HEIGHT: 63 IN | DIASTOLIC BLOOD PRESSURE: 74 MMHG | BODY MASS INDEX: 43.41 KG/M2 | SYSTOLIC BLOOD PRESSURE: 103 MMHG | TEMPERATURE: 97.9 F | RESPIRATION RATE: 16 BRPM | HEART RATE: 70 BPM

## 2020-06-04 LAB
ANION GAP SERPL CALCULATED.3IONS-SCNC: 13 MEQ/L (ref 8–16)
BASOPHILS # BLD: 0.7 %
BASOPHILS ABSOLUTE: 0.1 THOU/MM3 (ref 0–0.1)
BUN BLDV-MCNC: 21 MG/DL (ref 7–22)
CALCIUM IONIZED: 0.75 MMOL/L (ref 1.12–1.32)
CALCIUM SERPL-MCNC: 7.3 MG/DL (ref 8.5–10.5)
CHLORIDE BLD-SCNC: 98 MEQ/L (ref 98–111)
CO2: 28 MEQ/L (ref 23–33)
CREAT SERPL-MCNC: 1.2 MG/DL (ref 0.4–1.2)
EKG ATRIAL RATE: 66 BPM
EKG P AXIS: 54 DEGREES
EKG P-R INTERVAL: 154 MS
EKG Q-T INTERVAL: 432 MS
EKG QRS DURATION: 52 MS
EKG QTC CALCULATION (BAZETT): 452 MS
EKG R AXIS: 88 DEGREES
EKG T AXIS: 40 DEGREES
EKG VENTRICULAR RATE: 66 BPM
EOSINOPHIL # BLD: 2 %
EOSINOPHILS ABSOLUTE: 0.2 THOU/MM3 (ref 0–0.4)
ERYTHROCYTE [DISTWIDTH] IN BLOOD BY AUTOMATED COUNT: 14.2 % (ref 11.5–14.5)
ERYTHROCYTE [DISTWIDTH] IN BLOOD BY AUTOMATED COUNT: 48.7 FL (ref 35–45)
GFR SERPL CREATININE-BSD FRML MDRD: 50 ML/MIN/1.73M2
GLUCOSE BLD-MCNC: 90 MG/DL (ref 70–108)
HCT VFR BLD CALC: 43.2 % (ref 37–47)
HEMOGLOBIN: 13.9 GM/DL (ref 12–16)
IMMATURE GRANS (ABS): 0.03 THOU/MM3 (ref 0–0.07)
IMMATURE GRANULOCYTES: 0.3 %
LYMPHOCYTES # BLD: 34.1 %
LYMPHOCYTES ABSOLUTE: 3.2 THOU/MM3 (ref 1–4.8)
MCH RBC QN AUTO: 30 PG (ref 26–33)
MCHC RBC AUTO-ENTMCNC: 32.2 GM/DL (ref 32.2–35.5)
MCV RBC AUTO: 93.1 FL (ref 81–99)
MONOCYTES # BLD: 4.8 %
MONOCYTES ABSOLUTE: 0.5 THOU/MM3 (ref 0.4–1.3)
NUCLEATED RED BLOOD CELLS: 0 /100 WBC
OSMOLALITY CALCULATION: 280 MOSMOL/KG (ref 275–300)
PLATELET # BLD: 206 THOU/MM3 (ref 130–400)
PMV BLD AUTO: 12.3 FL (ref 9.4–12.4)
POTASSIUM REFLEX MAGNESIUM: 4.4 MEQ/L (ref 3.5–5.2)
RBC # BLD: 4.64 MILL/MM3 (ref 4.2–5.4)
SEG NEUTROPHILS: 58.1 %
SEGMENTED NEUTROPHILS ABSOLUTE COUNT: 5.5 THOU/MM3 (ref 1.8–7.7)
SODIUM BLD-SCNC: 139 MEQ/L (ref 135–145)
WBC # BLD: 9.4 THOU/MM3 (ref 4.8–10.8)

## 2020-06-04 PROCEDURE — 85025 COMPLETE CBC W/AUTO DIFF WBC: CPT

## 2020-06-04 PROCEDURE — 36415 COLL VENOUS BLD VENIPUNCTURE: CPT

## 2020-06-04 PROCEDURE — 82330 ASSAY OF CALCIUM: CPT

## 2020-06-04 PROCEDURE — 6370000000 HC RX 637 (ALT 250 FOR IP): Performed by: EMERGENCY MEDICINE

## 2020-06-04 PROCEDURE — 96365 THER/PROPH/DIAG IV INF INIT: CPT

## 2020-06-04 PROCEDURE — 93005 ELECTROCARDIOGRAM TRACING: CPT | Performed by: EMERGENCY MEDICINE

## 2020-06-04 PROCEDURE — 6360000002 HC RX W HCPCS: Performed by: EMERGENCY MEDICINE

## 2020-06-04 PROCEDURE — 2580000003 HC RX 258: Performed by: EMERGENCY MEDICINE

## 2020-06-04 PROCEDURE — 80048 BASIC METABOLIC PNL TOTAL CA: CPT

## 2020-06-04 PROCEDURE — 99284 EMERGENCY DEPT VISIT MOD MDM: CPT

## 2020-06-04 RX ORDER — CALCITRIOL 0.25 UG/1
0.5 CAPSULE, LIQUID FILLED ORAL ONCE
Status: COMPLETED | OUTPATIENT
Start: 2020-06-04 | End: 2020-06-04

## 2020-06-04 RX ORDER — CALCITRIOL 0.5 UG/1
1 CAPSULE, LIQUID FILLED ORAL 4 TIMES DAILY
Qty: 56 CAPSULE | Refills: 0 | Status: SHIPPED | OUTPATIENT
Start: 2020-06-04 | End: 2020-10-30

## 2020-06-04 RX ADMIN — CALCITRIOL 0.5 MCG: 0.25 CAPSULE ORAL at 21:04

## 2020-06-04 RX ADMIN — CALCIUM GLUCONATE 1 G: 98 INJECTION, SOLUTION INTRAVENOUS at 19:58

## 2020-06-05 ASSESSMENT — ENCOUNTER SYMPTOMS
NAUSEA: 0
ABDOMINAL DISTENTION: 0
WHEEZING: 0
VOMITING: 0
SINUS PRESSURE: 0
SHORTNESS OF BREATH: 0
EYE REDNESS: 0
CONSTIPATION: 0
SORE THROAT: 0
ABDOMINAL PAIN: 0
RHINORRHEA: 0
CHEST TIGHTNESS: 0
BACK PAIN: 0
DIARRHEA: 0

## 2020-06-05 NOTE — ED NOTES
Calcium infusion started. Pt is sitting in bed comfortably watching [employer] [place] on phone. She requests a blanket which was provided as well as lights dimmed and door closed.       Darlene Martinez RN  06/04/20 2010

## 2020-06-11 ENCOUNTER — HOSPITAL ENCOUNTER (OUTPATIENT)
Age: 40
Discharge: HOME | End: 2020-06-11
Payer: COMMERCIAL

## 2020-06-11 LAB — MRSA SCREEN RT-PCR: NEGATIVE

## 2020-06-11 PROCEDURE — 87641 MR-STAPH DNA AMP PROBE: CPT

## 2020-09-21 ENCOUNTER — HOSPITAL ENCOUNTER (EMERGENCY)
Age: 40
Discharge: HOME | End: 2020-09-22
Attending: EMERGENCY MEDICINE
Payer: COMMERCIAL

## 2020-09-21 VITALS
SYSTOLIC BLOOD PRESSURE: 109 MMHG | HEART RATE: 69 BPM | HEIGHT: 63 IN | RESPIRATION RATE: 18 BRPM | DIASTOLIC BLOOD PRESSURE: 71 MMHG | TEMPERATURE: 98 F | BODY MASS INDEX: 43.41 KG/M2 | OXYGEN SATURATION: 97 % | WEIGHT: 245 LBS

## 2020-09-21 LAB
ALBUMIN SERPL-MCNC: 4.2 G/DL (ref 3.5–5.1)
ALP BLD-CCNC: 110 U/L (ref 38–126)
ALT SERPL-CCNC: 44 U/L (ref 11–66)
ANION GAP SERPL CALCULATED.3IONS-SCNC: 16 MEQ/L (ref 8–16)
AST SERPL-CCNC: 41 U/L (ref 5–40)
BASOPHILS # BLD: 0.9 %
BASOPHILS ABSOLUTE: 0.1 THOU/MM3 (ref 0–0.1)
BILIRUB SERPL-MCNC: 0.4 MG/DL (ref 0.3–1.2)
BUN BLDV-MCNC: 17 MG/DL (ref 7–22)
CALCIUM IONIZED: 0.68 MMOL/L (ref 1.12–1.32)
CALCIUM SERPL-MCNC: 5.9 MG/DL (ref 8.5–10.5)
CHLORIDE BLD-SCNC: 101 MEQ/L (ref 98–111)
CO2: 26 MEQ/L (ref 23–33)
CREAT SERPL-MCNC: 1.2 MG/DL (ref 0.4–1.2)
EKG ATRIAL RATE: 90 BPM
EKG P AXIS: 67 DEGREES
EKG P-R INTERVAL: 144 MS
EKG Q-T INTERVAL: 450 MS
EKG QRS DURATION: 58 MS
EKG QTC CALCULATION (BAZETT): 550 MS
EKG R AXIS: 95 DEGREES
EKG T AXIS: 43 DEGREES
EKG VENTRICULAR RATE: 90 BPM
EOSINOPHIL # BLD: 1.9 %
EOSINOPHILS ABSOLUTE: 0.1 THOU/MM3 (ref 0–0.4)
ERYTHROCYTE [DISTWIDTH] IN BLOOD BY AUTOMATED COUNT: 14.6 % (ref 11.5–14.5)
ERYTHROCYTE [DISTWIDTH] IN BLOOD BY AUTOMATED COUNT: 50.8 FL (ref 35–45)
GFR SERPL CREATININE-BSD FRML MDRD: 50 ML/MIN/1.73M2
GLUCOSE BLD-MCNC: 124 MG/DL (ref 70–108)
HCT VFR BLD CALC: 41.3 % (ref 37–47)
HEMOGLOBIN: 13.4 GM/DL (ref 12–16)
IMMATURE GRANS (ABS): 0.04 THOU/MM3 (ref 0–0.07)
IMMATURE GRANULOCYTES: 0.5 %
LYMPHOCYTES # BLD: 30.8 %
LYMPHOCYTES ABSOLUTE: 2.3 THOU/MM3 (ref 1–4.8)
MCH RBC QN AUTO: 30.6 PG (ref 26–33)
MCHC RBC AUTO-ENTMCNC: 32.4 GM/DL (ref 32.2–35.5)
MCV RBC AUTO: 94.3 FL (ref 81–99)
MONOCYTES # BLD: 4.2 %
MONOCYTES ABSOLUTE: 0.3 THOU/MM3 (ref 0.4–1.3)
NUCLEATED RED BLOOD CELLS: 0 /100 WBC
OSMOLALITY CALCULATION: 287.9 MOSMOL/KG (ref 275–300)
PLATELET # BLD: 192 THOU/MM3 (ref 130–400)
PMV BLD AUTO: 12.4 FL (ref 9.4–12.4)
POTASSIUM REFLEX MAGNESIUM: 4.1 MEQ/L (ref 3.5–5.2)
RBC # BLD: 4.38 MILL/MM3 (ref 4.2–5.4)
SEG NEUTROPHILS: 61.7 %
SEGMENTED NEUTROPHILS ABSOLUTE COUNT: 4.6 THOU/MM3 (ref 1.8–7.7)
SODIUM BLD-SCNC: 143 MEQ/L (ref 135–145)
TOTAL PROTEIN: 6.7 G/DL (ref 6.1–8)
WBC # BLD: 7.4 THOU/MM3 (ref 4.8–10.8)

## 2020-09-21 PROCEDURE — 2580000003 HC RX 258: Performed by: EMERGENCY MEDICINE

## 2020-09-21 PROCEDURE — 96365 THER/PROPH/DIAG IV INF INIT: CPT

## 2020-09-21 PROCEDURE — 93005 ELECTROCARDIOGRAM TRACING: CPT | Performed by: EMERGENCY MEDICINE

## 2020-09-21 PROCEDURE — 99285 EMERGENCY DEPT VISIT HI MDM: CPT

## 2020-09-21 PROCEDURE — 85025 COMPLETE CBC W/AUTO DIFF WBC: CPT

## 2020-09-21 PROCEDURE — 96366 THER/PROPH/DIAG IV INF ADDON: CPT

## 2020-09-21 PROCEDURE — 6360000002 HC RX W HCPCS: Performed by: EMERGENCY MEDICINE

## 2020-09-21 PROCEDURE — 93010 ELECTROCARDIOGRAM REPORT: CPT | Performed by: INTERNAL MEDICINE

## 2020-09-21 PROCEDURE — 36415 COLL VENOUS BLD VENIPUNCTURE: CPT

## 2020-09-21 PROCEDURE — 99284 EMERGENCY DEPT VISIT MOD MDM: CPT

## 2020-09-21 PROCEDURE — 82330 ASSAY OF CALCIUM: CPT

## 2020-09-21 PROCEDURE — 80053 COMPREHEN METABOLIC PANEL: CPT

## 2020-09-21 RX ADMIN — CALCIUM GLUCONATE 3 G: 98 INJECTION, SOLUTION INTRAVENOUS at 20:43

## 2020-09-21 ASSESSMENT — PAIN DESCRIPTION - PAIN TYPE: TYPE: ACUTE PAIN

## 2020-09-21 ASSESSMENT — ENCOUNTER SYMPTOMS
SHORTNESS OF BREATH: 0
COUGH: 0
DIARRHEA: 0
BACK PAIN: 0
NAUSEA: 0
EYE DISCHARGE: 0
EYE PAIN: 0
RHINORRHEA: 0
WHEEZING: 0
PHOTOPHOBIA: 0
ABDOMINAL DISTENTION: 0
CHEST TIGHTNESS: 1
VOMITING: 0
STRIDOR: 0
EYE ITCHING: 0
SORE THROAT: 0
ABDOMINAL PAIN: 0
CONSTIPATION: 0
EYE REDNESS: 0

## 2020-09-21 ASSESSMENT — PAIN DESCRIPTION - PROGRESSION: CLINICAL_PROGRESSION: GRADUALLY WORSENING

## 2020-09-21 ASSESSMENT — PAIN DESCRIPTION - FREQUENCY: FREQUENCY: INTERMITTENT

## 2020-09-21 ASSESSMENT — PAIN SCALES - GENERAL: PAINLEVEL_OUTOF10: 8

## 2020-09-21 ASSESSMENT — PAIN DESCRIPTION - DESCRIPTORS: DESCRIPTORS: SPASM;SHARP

## 2020-09-21 ASSESSMENT — PAIN DESCRIPTION - LOCATION: LOCATION: GENERALIZED

## 2020-09-21 ASSESSMENT — PAIN DESCRIPTION - ONSET: ONSET: ON-GOING

## 2020-09-21 NOTE — ED NOTES
Assumed care at this time. Bedside report received from [facility]. Pt sitting in bed. Pt updated on POC. PT verbalized understanding. Pt given blanket and lights dimmed for comfort.  Will continue to monitor      Ignacio Aiken RN  09/21/20 1003

## 2020-09-21 NOTE — ED PROVIDER NOTES
[address] ENCOUNTER      PATIENT NAME: Saurabh Nunes  MRN: [id number]  : 1980  SUAREZ: 2020  PROVIDER: Ralph Otero MD      CHIEF COMPLAINT       Chief Complaint   Patient presents with    Other     low calcium     Abdominal Pain    Chest Pain    Numbness       Nurses Notes reviewed and I agreeexcept as noted in the HPI. HISTORY OF PRESENT ILLNESS    Saurabh Nunes is a 44 y.o. female who presents to Emergency Department with Other (low calcium ); Abdominal Pain; Chest Pain; and Numbness      She is S/P total thyroidectomy at [facility] on 2019. She has history of gastric bypass in 2018 on calcium supplements. She has been experienced hypocalcemia ever since then and she received calcium transfusion from time to time in ED. She woke up from a nap yesterday, suddenly felt left arm numbness and perioral numbness. She took extra dose of Calcium not feeling improvement. She also felt her her chest was tight which never happened before. She than presented to ED.      No fever or chills. No chest pain. No shortness of breath. She seems anxious. No nausea vomiting. No abdominal pain. Normal urination. Surgical incision clean dry intact. This HPI was provided by the patient. REVIEW OF SYSTEMS     Review of Systems   Constitutional: Negative for activity change, appetite change, chills, fatigue, fever and unexpected weight change. HENT: Negative for congestion, ear discharge, ear pain, hearing loss, nosebleeds, rhinorrhea and sore throat. Eyes: Negative for photophobia, pain, discharge, redness and itching. Respiratory: Positive for chest tightness. Negative for cough, shortness of breath, wheezing and stridor. Cardiovascular: Negative for chest pain, palpitations and leg swelling. Gastrointestinal: Negative for abdominal distention, abdominal pain, constipation, diarrhea, nausea and vomiting.    Endocrine: Negative for cold intolerance, heat intolerance, polydipsia and polyphagia. Genitourinary: Negative for dysuria, flank pain, frequency and hematuria. Musculoskeletal: Negative for arthralgias, back pain, gait problem, myalgias, neck pain and neck stiffness. Skin: Negative for pallor, rash and wound. Allergic/Immunologic: Negative for environmental allergies and food allergies. Neurological: Positive for numbness. Negative for dizziness, tremors, syncope, weakness and headaches. Psychiatric/Behavioral: Negative for agitation, behavioral problems, confusion, self-injury, sleep disturbance and suicidal ideas. The patient is nervous/anxious. PAST MEDICAL HISTORY    has a past medical history of Adjustment disorder, Anxiety, GERD (gastroesophageal reflux disease), Hypothyroidism, Obesity, Seasonal allergies, and Thyroid nodule. SURGICAL HISTORY      has a past surgical history that includes  section (,,); Cholecystectomy (); Hysterectomy (); Tubal ligation (); Bariatric Surgery (11/15/2018); Upper gastrointestinal endoscopy (11/15/2018); other surgical history (11/15/2018); Tonsillectomy; and Thyroidectomy (N/A, 2019).     CURRENT MEDICATIONS       Discharge Medication List as of 2020 11:26 PM      CONTINUE these medications which have NOT CHANGED    Details   calcium citrate (CALCITRATE) 250 MG TABS tablet Take 4 tablets by mouth 4 times daily, Disp-480 tablet, R-0Normal      magnesium oxide (MAG-OX) 400 (241.3 Mg) MG TABS tablet Take 1 tablet by mouth daily for 7 days, Disp-7 tablet, R-0Normal      calcitRIOL (ROCALTROL) 0.5 MCG capsule Take 2 capsules by mouth 4 times daily, Disp-120 capsule, R-0Normal      vitamin D (ERGOCALCIFEROL) 06933 units capsule Take 1 capsule by mouth once a week, Disp-5 capsule, R-0Normal      Loratadine-Pseudoephedrine (CLARITIN-D 24 HOUR PO) Take 1 tablet by mouth dailyHistorical Med      levothyroxine (SYNTHROID) 200 MCG tablet Take 200 mcg by mouth DailyHistorical Med      ondansetron (ZOFRAN ODT) 4 MG disintegrating tablet Take 1 tablet by mouth every 8 hours as needed for Nausea, Disp-20 tablet, R-0Print      vitamin B-12 (CYANOCOBALAMIN) 500 MCG tablet Take 1 tablet by mouth daily, R-6Historical Med      spironolactone (ALDACTONE) 50 MG tablet Take 1 tablet by mouth as needed , R-6Historical Med      EQ EYE ITCH RELIEF 0.025 % ophthalmic solution as needed, R-6, DAWHistorical Med      furosemide (LASIX) 40 MG tablet Take 1 tablet by mouth as needed , R-6Historical Med      ARTIFICIAL TEARS 1.4 % ophthalmic solution as needed, R-3, DAWHistorical Med      estradiol (ESTRACE) 2 MG tablet Take 1 tablet by mouth daily, R-6Historical Med      Multiple Vitamin (MULTI VITAMIN DAILY PO) Take by mouth Indications: flinstonesHistorical Med      White Petrolatum-Mineral Oil ([employer] EYE LUBRICANT NIGHTTIME) OINT Apply to eyeHistorical Med             ALLERGIES     is allergic to latex. FAMILY HISTORY     She indicated that her mother is alive. She indicated that her father is alive. She indicated that her sister is alive. She indicated that her brother is alive. She indicated that her maternal grandmother is . She indicated that her maternal grandfather is . She indicated that her paternal grandmother is alive. She indicated that her paternal grandfather is . family history includes Arthritis in her father; Cancer in her paternal grandmother; Diabetes in her father, maternal grandfather, and paternal grandfather; Hypertension in her father; Obesity in her maternal grandmother; Other in her mother. SOCIAL HISTORY      reports that she has been smoking. She has been smoking about 0.50 packs per day. She has never used smokeless tobacco. She reports current alcohol use. She reports that she does not use drugs. PHYSICAL EXAM     INITIAL VITALS:  height is 5' 3\" (1.6 m) and weight is 245 lb (111.1 kg).  Her oral temperature is 98 content normal.         Judgment: Judgment normal.           DIFFERENTIAL DIAGNOSIS:   Hypocalcemia, anxiety, dehydration    DIAGNOSTIC RESULTS     EKG: All EKG's are interpreted by the Emergency Department Physician who either signs or Co-signsthis chart in the absence of a cardiologist.  Interpreted by me  Compared to old EKG from 6/4/2020  Normal sinus rhythm  Ventricular rate 90 bpm  NM interval 144 ms  QRS duration 58 ms   ms  No ST elevation or QT wave    RADIOLOGY: non-plain film images(s) such as CT, Ultrasound and MRI are read by the radiologist.    No orders to display       []Visualized and interpreted by me   [] Radiologist's Wet Read Report Reviewed   [] Discussed with Radiologist.    LABS:   Results for orders placed or performed during the hospital encounter of 09/21/20   CBC auto differential   Result Value Ref Range    WBC 7.4 4.8 - 10.8 thou/mm3    RBC 4.38 4.20 - 5.40 mill/mm3    Hemoglobin 13.4 12.0 - 16.0 gm/dl    Hematocrit 41.3 37.0 - 47.0 %    MCV 94.3 81.0 - 99.0 fL    MCH 30.6 26.0 - 33.0 pg    MCHC 32.4 32.2 - 35.5 gm/dl    RDW-CV 14.6 (H) 11.5 - 14.5 %    RDW-SD 50.8 (H) 35.0 - 45.0 fL    Platelets 114 806 - 653 thou/mm3    MPV 12.4 9.4 - 12.4 fL    Seg Neutrophils 61.7 %    Lymphocytes 30.8 %    Monocytes 4.2 %    Eosinophils 1.9 %    Basophils 0.9 %    Immature Granulocytes 0.5 %    Segs Absolute 4.6 1.8 - 7.7 thou/mm3    Lymphocytes Absolute 2.3 1.0 - 4.8 thou/mm3    Monocytes Absolute 0.3 (L) 0.4 - 1.3 thou/mm3    Eosinophils Absolute 0.1 0.0 - 0.4 thou/mm3    Basophils Absolute 0.1 0.0 - 0.1 thou/mm3    Immature Grans (Abs) 0.04 0.00 - 0.07 thou/mm3    nRBC 0 /100 wbc   Comprehensive Metabolic Panel w/ Reflex to MG   Result Value Ref Range    Glucose 124 (H) 70 - 108 mg/dL    CREATININE 1.2 0.4 - 1.2 mg/dL    BUN 17 7 - 22 mg/dL    Sodium 143 135 - 145 meq/L    Potassium reflex Magnesium 4.1 3.5 - 5.2 meq/L    Chloride 101 98 - 111 meq/L    CO2 26 23 - 33 meq/L    Calcium 5.9 (LL) 8.5 - 10.5 mg/dL    AST 41 (H) 5 - 40 U/L    Alkaline Phosphatase 110 38 - 126 U/L    Total Protein 6.7 6.1 - 8.0 g/dL    Alb 4.2 3.5 - 5.1 g/dL    Total Bilirubin 0.4 0.3 - 1.2 mg/dL    ALT 44 11 - 66 U/L   Calcium, Ionized   Result Value Ref Range    Calcium, Ion 0.68 (LL) 1.12 - 1.32 mmol/L   Anion Gap   Result Value Ref Range    Anion Gap 16.0 8.0 - 16.0 meq/L   Glomerular Filtration Rate, Estimated   Result Value Ref Range    Est, Glom Filt Rate 50 (A) ml/min/1.73m2   Osmolality   Result Value Ref Range    Osmolality Calc 287.9 275.0 - 300.0 mOsmol/kg   EKG Emergency   Result Value Ref Range    Ventricular Rate 90 BPM    Atrial Rate 90 BPM    P-R Interval 144 ms    QRS Duration 58 ms    Q-T Interval 450 ms    QTc Calculation (Bazett) 550 ms    P Axis 67 degrees    R Axis 95 degrees    T Axis 43 degrees       EMERGENCY DEPARTMENT COURSE:   Vitals:    Vitals:    09/21/20 2021 09/21/20 2156 09/21/20 2247 09/21/20 2358   BP: 94/63 104/80 (!) 110/51 109/71   Pulse: 72 80 73 69   Resp: 17 18 18 18   Temp:       TempSrc:       SpO2: 99% 97% 98% 97%   Weight:       Height:           7:33 PM: Patient is seen and evaluated in a timely fashion. ACTIONS:    Large bore IV  Tele monitor  EKG  Labs  Medications   calcium gluconate 3 g in dextrose 5 % 100 mL IVPB (0 g Intravenous Stopped 9/22/20 0041)     MEDICAL DECISION MAKINGS:    Total calcium was 5.9, ionized calcium 0.68. Patient received 3 g of IV calcium gluconate and all her symptoms resolved on reassessment. Discharged with PCP follow-up in 1 week. CRITICAL CARE:   None    CONSULTS:  None    PROCEDURES:  None    FINAL IMPRESSION      1.  Hypocalcemia          DISPOSITION/PLAN   Home    PATIENT REFERRED TO:  EBEN Tamayo - CNP  [address] [facility]  [phone]    In 1 week  ED discharge follow up      DISCHARGE MEDICATIONS:  Discharge Medication List as of 9/21/2020 11:26 PM          (Please note that portions of this note were completed with a voice recognition program.  Efforts were made to edit the dictations but occasionally words aremis-transcribed.)    MD Varinder Giraldo MD  09/22/20 0919

## 2020-09-22 NOTE — ED NOTES
Pt IV flushed. PT stated the IV is hurting when RN flushes. IV site is not red or irritated. IV does not bubble up or feel infiltrated.  PT states she wanted a new IV      Henna Orantes RN  09/21/20 2100

## 2020-09-22 NOTE — ED NOTES
PT given crackers and apple juice. Lights dimmed for comfort. Pt medicated per MAR and updated on POC. PT verbalized understanding.       Griselda Kaufmann, RN  09/21/20 1722

## 2020-10-09 ENCOUNTER — HOSPITAL ENCOUNTER (OUTPATIENT)
Age: 40
Setting detail: SPECIMEN
Discharge: HOME | End: 2020-10-09
Payer: COMMERCIAL

## 2020-10-09 LAB
ABSOLUTE EOS #: 0.18 K/UL (ref 0–0.44)
ABSOLUTE IMMATURE GRANULOCYTE: <0.03 K/UL (ref 0–0.3)
ABSOLUTE LYMPH #: 2.53 K/UL (ref 1.1–3.7)
ABSOLUTE MONO #: 0.32 K/UL (ref 0.1–1.2)
ALBUMIN SERPL-MCNC: 4.3 G/DL (ref 3.5–5.2)
ALBUMIN/GLOBULIN RATIO: 1.5 (ref 1–2.5)
ALP BLD-CCNC: 113 U/L (ref 35–104)
ALT SERPL-CCNC: 37 U/L (ref 5–33)
ANION GAP SERPL CALCULATED.3IONS-SCNC: 18 MMOL/L (ref 9–17)
AST SERPL-CCNC: 35 U/L
BASOPHILS # BLD: 1 % (ref 0–2)
BASOPHILS ABSOLUTE: 0.08 K/UL (ref 0–0.2)
BILIRUB SERPL-MCNC: 0.48 MG/DL (ref 0.3–1.2)
BUN BLDV-MCNC: 22 MG/DL (ref 6–20)
BUN/CREAT BLD: ABNORMAL (ref 9–20)
CALCIUM SERPL-MCNC: 7 MG/DL (ref 8.6–10.4)
CHLORIDE BLD-SCNC: 101 MMOL/L (ref 98–107)
CO2: 24 MMOL/L (ref 20–31)
CREAT SERPL-MCNC: 1.16 MG/DL (ref 0.5–0.9)
DIFFERENTIAL TYPE: ABNORMAL
EOSINOPHILS RELATIVE PERCENT: 3 % (ref 1–4)
GFR AFRICAN AMERICAN: >60 ML/MIN
GFR NON-AFRICAN AMERICAN: 52 ML/MIN
GFR SERPL CREATININE-BSD FRML MDRD: ABNORMAL ML/MIN/{1.73_M2}
GFR SERPL CREATININE-BSD FRML MDRD: ABNORMAL ML/MIN/{1.73_M2}
GLUCOSE BLD-MCNC: 82 MG/DL (ref 70–99)
HCT VFR BLD CALC: 41.8 % (ref 36.3–47.1)
HEMOGLOBIN: 13.3 G/DL (ref 11.9–15.1)
IMMATURE GRANULOCYTES: 0 %
LYMPHOCYTES # BLD: 38 % (ref 24–43)
MCH RBC QN AUTO: 29.6 PG (ref 25.2–33.5)
MCHC RBC AUTO-ENTMCNC: 31.8 G/DL (ref 28.4–34.8)
MCV RBC AUTO: 93.1 FL (ref 82.6–102.9)
MONOCYTES # BLD: 5 % (ref 3–12)
NRBC AUTOMATED: 0 PER 100 WBC
PDW BLD-RTO: 14.7 % (ref 11.8–14.4)
PLATELET # BLD: 178 K/UL (ref 138–453)
PLATELET ESTIMATE: ABNORMAL
PMV BLD AUTO: 12.5 FL (ref 8.1–13.5)
POTASSIUM SERPL-SCNC: 4.3 MMOL/L (ref 3.7–5.3)
RBC # BLD: 4.49 M/UL (ref 3.95–5.11)
RBC # BLD: ABNORMAL 10*6/UL
SEG NEUTROPHILS: 53 % (ref 36–65)
SEGMENTED NEUTROPHILS ABSOLUTE COUNT: 3.45 K/UL (ref 1.5–8.1)
SODIUM BLD-SCNC: 143 MMOL/L (ref 135–144)
TOTAL PROTEIN: 7.1 G/DL (ref 6.4–8.3)
WBC # BLD: 6.6 K/UL (ref 3.5–11.3)
WBC # BLD: ABNORMAL 10*3/UL

## 2020-10-13 LAB — VITAMIN D 1,25-DIHYDROXY: 15 PG/ML (ref 19.9–79.3)

## 2020-10-30 ENCOUNTER — HOSPITAL ENCOUNTER (INPATIENT)
Age: 40
LOS: 1 days | Discharge: LEFT BEFORE DISCHARGE | DRG: 427 | End: 2020-10-31
Attending: FAMILY MEDICINE | Admitting: FAMILY MEDICINE
Payer: COMMERCIAL

## 2020-10-30 ENCOUNTER — APPOINTMENT (OUTPATIENT)
Dept: GENERAL RADIOLOGY | Age: 40
DRG: 427 | End: 2020-10-30
Payer: COMMERCIAL

## 2020-10-30 LAB
ANION GAP SERPL CALCULATED.3IONS-SCNC: 14 MEQ/L (ref 8–16)
BASOPHILS # BLD: 1.1 %
BASOPHILS ABSOLUTE: 0.1 THOU/MM3 (ref 0–0.1)
BUN BLDV-MCNC: 20 MG/DL (ref 7–22)
CALCIUM IONIZED: 0.58 MMOL/L (ref 1.12–1.32)
CALCIUM SERPL-MCNC: 5.4 MG/DL (ref 8.5–10.5)
CHLORIDE BLD-SCNC: 102 MEQ/L (ref 98–111)
CO2: 24 MEQ/L (ref 23–33)
CREAT SERPL-MCNC: 1.4 MG/DL (ref 0.4–1.2)
EKG ATRIAL RATE: 81 BPM
EKG P AXIS: 34 DEGREES
EKG P-R INTERVAL: 154 MS
EKG Q-T INTERVAL: 342 MS
EKG QRS DURATION: 52 MS
EKG QTC CALCULATION (BAZETT): 397 MS
EKG R AXIS: 98 DEGREES
EKG T AXIS: -31 DEGREES
EKG VENTRICULAR RATE: 81 BPM
EOSINOPHIL # BLD: 2.5 %
EOSINOPHILS ABSOLUTE: 0.2 THOU/MM3 (ref 0–0.4)
ERYTHROCYTE [DISTWIDTH] IN BLOOD BY AUTOMATED COUNT: 14.4 % (ref 11.5–14.5)
ERYTHROCYTE [DISTWIDTH] IN BLOOD BY AUTOMATED COUNT: 49.6 FL (ref 35–45)
GFR SERPL CREATININE-BSD FRML MDRD: 42 ML/MIN/1.73M2
GLUCOSE BLD-MCNC: 99 MG/DL (ref 70–108)
HCT VFR BLD CALC: 41.4 % (ref 37–47)
HEMOGLOBIN: 13.5 GM/DL (ref 12–16)
IMMATURE GRANS (ABS): 0.02 THOU/MM3 (ref 0–0.07)
IMMATURE GRANULOCYTES: 0.3 %
LYMPHOCYTES # BLD: 39.1 %
LYMPHOCYTES ABSOLUTE: 2.4 THOU/MM3 (ref 1–4.8)
MAGNESIUM: 1.7 MG/DL (ref 1.6–2.4)
MCH RBC QN AUTO: 30.5 PG (ref 26–33)
MCHC RBC AUTO-ENTMCNC: 32.6 GM/DL (ref 32.2–35.5)
MCV RBC AUTO: 93.7 FL (ref 81–99)
MONOCYTES # BLD: 4.1 %
MONOCYTES ABSOLUTE: 0.3 THOU/MM3 (ref 0.4–1.3)
NUCLEATED RED BLOOD CELLS: 0 /100 WBC
OSMOLALITY CALCULATION: 282 MOSMOL/KG (ref 275–300)
PLATELET # BLD: 155 THOU/MM3 (ref 130–400)
PMV BLD AUTO: 12.6 FL (ref 9.4–12.4)
POTASSIUM REFLEX MAGNESIUM: 4.1 MEQ/L (ref 3.5–5.2)
RBC # BLD: 4.42 MILL/MM3 (ref 4.2–5.4)
SEG NEUTROPHILS: 52.9 %
SEGMENTED NEUTROPHILS ABSOLUTE COUNT: 3.2 THOU/MM3 (ref 1.8–7.7)
SODIUM BLD-SCNC: 140 MEQ/L (ref 135–145)
T4 FREE: < 0.1 NG/DL (ref 0.93–1.76)
TROPONIN T: < 0.01 NG/ML
TSH SERPL DL<=0.05 MIU/L-ACNC: 86.49 UIU/ML (ref 0.4–4.2)
VITAMIN D 25-HYDROXY: 14 NG/ML (ref 30–100)
WBC # BLD: 6.1 THOU/MM3 (ref 4.8–10.8)

## 2020-10-30 PROCEDURE — 83970 ASSAY OF PARATHORMONE: CPT

## 2020-10-30 PROCEDURE — 96365 THER/PROPH/DIAG IV INF INIT: CPT

## 2020-10-30 PROCEDURE — 6360000002 HC RX W HCPCS: Performed by: PHYSICIAN ASSISTANT

## 2020-10-30 PROCEDURE — 99285 EMERGENCY DEPT VISIT HI MDM: CPT

## 2020-10-30 PROCEDURE — 93005 ELECTROCARDIOGRAM TRACING: CPT | Performed by: EMERGENCY MEDICINE

## 2020-10-30 PROCEDURE — 2580000003 HC RX 258: Performed by: PHYSICIAN ASSISTANT

## 2020-10-30 PROCEDURE — 82746 ASSAY OF FOLIC ACID SERUM: CPT

## 2020-10-30 PROCEDURE — 82306 VITAMIN D 25 HYDROXY: CPT

## 2020-10-30 PROCEDURE — 82607 VITAMIN B-12: CPT

## 2020-10-30 PROCEDURE — G0378 HOSPITAL OBSERVATION PER HR: HCPCS

## 2020-10-30 PROCEDURE — 99222 1ST HOSP IP/OBS MODERATE 55: CPT | Performed by: FAMILY MEDICINE

## 2020-10-30 PROCEDURE — 36415 COLL VENOUS BLD VENIPUNCTURE: CPT

## 2020-10-30 PROCEDURE — 84439 ASSAY OF FREE THYROXINE: CPT

## 2020-10-30 PROCEDURE — 83735 ASSAY OF MAGNESIUM: CPT

## 2020-10-30 PROCEDURE — 85025 COMPLETE CBC W/AUTO DIFF WBC: CPT

## 2020-10-30 PROCEDURE — 80048 BASIC METABOLIC PNL TOTAL CA: CPT

## 2020-10-30 PROCEDURE — 84443 ASSAY THYROID STIM HORMONE: CPT

## 2020-10-30 PROCEDURE — 96372 THER/PROPH/DIAG INJ SC/IM: CPT

## 2020-10-30 PROCEDURE — 82330 ASSAY OF CALCIUM: CPT

## 2020-10-30 PROCEDURE — 84484 ASSAY OF TROPONIN QUANT: CPT

## 2020-10-30 PROCEDURE — 71045 X-RAY EXAM CHEST 1 VIEW: CPT

## 2020-10-30 RX ORDER — ERGOCALCIFEROL 1.25 MG/1
50000 CAPSULE ORAL ONCE
Status: DISCONTINUED | OUTPATIENT
Start: 2020-10-30 | End: 2020-10-31

## 2020-10-30 RX ADMIN — CALCIUM GLUCONATE 2 G: 98 INJECTION, SOLUTION INTRAVENOUS at 22:37

## 2020-10-30 ASSESSMENT — PAIN DESCRIPTION - DESCRIPTORS: DESCRIPTORS: DISCOMFORT

## 2020-10-30 ASSESSMENT — PAIN SCALES - GENERAL: PAINLEVEL_OUTOF10: 5

## 2020-10-30 ASSESSMENT — PAIN DESCRIPTION - PAIN TYPE: TYPE: ACUTE PAIN

## 2020-10-30 ASSESSMENT — PAIN DESCRIPTION - ONSET: ONSET: ON-GOING

## 2020-10-30 ASSESSMENT — PAIN DESCRIPTION - FREQUENCY: FREQUENCY: INTERMITTENT

## 2020-10-30 ASSESSMENT — PAIN DESCRIPTION - PROGRESSION: CLINICAL_PROGRESSION: GRADUALLY IMPROVING

## 2020-10-30 ASSESSMENT — PAIN DESCRIPTION - LOCATION: LOCATION: FACE

## 2020-10-31 VITALS
HEART RATE: 70 BPM | HEIGHT: 63 IN | SYSTOLIC BLOOD PRESSURE: 109 MMHG | RESPIRATION RATE: 18 BRPM | OXYGEN SATURATION: 96 % | DIASTOLIC BLOOD PRESSURE: 61 MMHG | WEIGHT: 255 LBS | TEMPERATURE: 97.7 F | BODY MASS INDEX: 45.18 KG/M2

## 2020-10-31 LAB
ALBUMIN SERPL-MCNC: 4.4 G/DL (ref 3.5–5.1)
ALP BLD-CCNC: 130 U/L (ref 38–126)
ALT SERPL-CCNC: 58 U/L (ref 11–66)
ANION GAP SERPL CALCULATED.3IONS-SCNC: 16 MEQ/L (ref 8–16)
ANION GAP SERPL CALCULATED.3IONS-SCNC: 17 MEQ/L (ref 8–16)
AST SERPL-CCNC: 66 U/L (ref 5–40)
BILIRUB SERPL-MCNC: 0.2 MG/DL (ref 0.3–1.2)
BUN BLDV-MCNC: 23 MG/DL (ref 7–22)
BUN BLDV-MCNC: 23 MG/DL (ref 7–22)
CALCIUM IONIZED: 0.67 MMOL/L (ref 1.12–1.32)
CALCIUM IONIZED: 0.72 MMOL/L (ref 1.12–1.32)
CALCIUM IONIZED: 0.76 MMOL/L (ref 1.12–1.32)
CALCIUM SERPL-MCNC: 6.2 MG/DL (ref 8.5–10.5)
CALCIUM SERPL-MCNC: 6.4 MG/DL (ref 8.5–10.5)
CALCIUM SERPL-MCNC: 7 MG/DL (ref 8.5–10.5)
CHLORIDE BLD-SCNC: 100 MEQ/L (ref 98–111)
CHLORIDE BLD-SCNC: 99 MEQ/L (ref 98–111)
CO2: 22 MEQ/L (ref 23–33)
CO2: 24 MEQ/L (ref 23–33)
CREAT SERPL-MCNC: 1.4 MG/DL (ref 0.4–1.2)
CREAT SERPL-MCNC: 1.4 MG/DL (ref 0.4–1.2)
D-DIMER QUANTITATIVE: 393 NG/ML FEU (ref 0–500)
ERYTHROCYTE [DISTWIDTH] IN BLOOD BY AUTOMATED COUNT: 14.6 % (ref 11.5–14.5)
ERYTHROCYTE [DISTWIDTH] IN BLOOD BY AUTOMATED COUNT: 49.4 FL (ref 35–45)
FOLATE: 10.1 NG/ML (ref 4.8–24.2)
GFR SERPL CREATININE-BSD FRML MDRD: 42 ML/MIN/1.73M2
GFR SERPL CREATININE-BSD FRML MDRD: 42 ML/MIN/1.73M2
GLUCOSE BLD-MCNC: 116 MG/DL (ref 70–108)
GLUCOSE BLD-MCNC: 84 MG/DL (ref 70–108)
HCT VFR BLD CALC: 38.6 % (ref 37–47)
HEMOGLOBIN: 12.9 GM/DL (ref 12–16)
MCH RBC QN AUTO: 31.1 PG (ref 26–33)
MCHC RBC AUTO-ENTMCNC: 33.4 GM/DL (ref 32.2–35.5)
MCV RBC AUTO: 93 FL (ref 81–99)
PLATELET # BLD: 155 THOU/MM3 (ref 130–400)
PMV BLD AUTO: 12.5 FL (ref 9.4–12.4)
POTASSIUM REFLEX MAGNESIUM: 3.6 MEQ/L (ref 3.5–5.2)
POTASSIUM SERPL-SCNC: 4.1 MEQ/L (ref 3.5–5.2)
PTH INTACT: 2.4 PG/ML (ref 15–65)
RBC # BLD: 4.15 MILL/MM3 (ref 4.2–5.4)
SODIUM BLD-SCNC: 137 MEQ/L (ref 135–145)
SODIUM BLD-SCNC: 141 MEQ/L (ref 135–145)
TOTAL PROTEIN: 6.7 G/DL (ref 6.1–8)
VITAMIN B-12: 485 PG/ML (ref 211–911)
WBC # BLD: 8.2 THOU/MM3 (ref 4.8–10.8)

## 2020-10-31 PROCEDURE — G0378 HOSPITAL OBSERVATION PER HR: HCPCS

## 2020-10-31 PROCEDURE — 82330 ASSAY OF CALCIUM: CPT

## 2020-10-31 PROCEDURE — 85379 FIBRIN DEGRADATION QUANT: CPT

## 2020-10-31 PROCEDURE — 85027 COMPLETE CBC AUTOMATED: CPT

## 2020-10-31 PROCEDURE — 6370000000 HC RX 637 (ALT 250 FOR IP): Performed by: NURSE PRACTITIONER

## 2020-10-31 PROCEDURE — 1200000003 HC TELEMETRY R&B

## 2020-10-31 PROCEDURE — 6360000002 HC RX W HCPCS: Performed by: STUDENT IN AN ORGANIZED HEALTH CARE EDUCATION/TRAINING PROGRAM

## 2020-10-31 PROCEDURE — 36415 COLL VENOUS BLD VENIPUNCTURE: CPT

## 2020-10-31 PROCEDURE — 6370000000 HC RX 637 (ALT 250 FOR IP): Performed by: STUDENT IN AN ORGANIZED HEALTH CARE EDUCATION/TRAINING PROGRAM

## 2020-10-31 PROCEDURE — 80053 COMPREHEN METABOLIC PANEL: CPT

## 2020-10-31 PROCEDURE — 2580000003 HC RX 258: Performed by: NURSE PRACTITIONER

## 2020-10-31 PROCEDURE — 96361 HYDRATE IV INFUSION ADD-ON: CPT

## 2020-10-31 PROCEDURE — 2580000003 HC RX 258: Performed by: STUDENT IN AN ORGANIZED HEALTH CARE EDUCATION/TRAINING PROGRAM

## 2020-10-31 PROCEDURE — 82310 ASSAY OF CALCIUM: CPT

## 2020-10-31 RX ORDER — ACETAMINOPHEN 650 MG/1
650 SUPPOSITORY RECTAL EVERY 6 HOURS PRN
Status: DISCONTINUED | OUTPATIENT
Start: 2020-10-30 | End: 2020-10-31 | Stop reason: HOSPADM

## 2020-10-31 RX ORDER — LANOLIN ALCOHOL/MO/W.PET/CERES
3 CREAM (GRAM) TOPICAL NIGHTLY PRN
Status: DISCONTINUED | OUTPATIENT
Start: 2020-10-31 | End: 2020-10-31

## 2020-10-31 RX ORDER — CALCIUM CARBONATE 200(500)MG
500 TABLET,CHEWABLE ORAL 4 TIMES DAILY
Status: DISCONTINUED | OUTPATIENT
Start: 2020-10-31 | End: 2020-10-31

## 2020-10-31 RX ORDER — PROMETHAZINE HYDROCHLORIDE 25 MG/1
12.5 TABLET ORAL EVERY 6 HOURS PRN
Status: DISCONTINUED | OUTPATIENT
Start: 2020-10-30 | End: 2020-10-31 | Stop reason: HOSPADM

## 2020-10-31 RX ORDER — LANOLIN ALCOHOL/MO/W.PET/CERES
6 CREAM (GRAM) TOPICAL NIGHTLY PRN
Status: DISCONTINUED | OUTPATIENT
Start: 2020-10-31 | End: 2020-10-31 | Stop reason: HOSPADM

## 2020-10-31 RX ORDER — CETIRIZINE HYDROCHLORIDE 10 MG/1
10 TABLET ORAL DAILY
Status: DISCONTINUED | OUTPATIENT
Start: 2020-10-31 | End: 2020-10-31 | Stop reason: HOSPADM

## 2020-10-31 RX ORDER — ONDANSETRON 2 MG/ML
4 INJECTION INTRAMUSCULAR; INTRAVENOUS EVERY 6 HOURS PRN
Status: DISCONTINUED | OUTPATIENT
Start: 2020-10-30 | End: 2020-10-31

## 2020-10-31 RX ORDER — CALCITRIOL 0.25 UG/1
1 CAPSULE, LIQUID FILLED ORAL 4 TIMES DAILY
Status: DISCONTINUED | OUTPATIENT
Start: 2020-10-31 | End: 2020-10-31 | Stop reason: HOSPADM

## 2020-10-31 RX ORDER — LEVOTHYROXINE SODIUM 0.1 MG/1
200 TABLET ORAL DAILY
Status: DISCONTINUED | OUTPATIENT
Start: 2020-10-31 | End: 2020-10-31

## 2020-10-31 RX ORDER — ESTRADIOL 1 MG/1
2 TABLET ORAL DAILY
Status: DISCONTINUED | OUTPATIENT
Start: 2020-10-31 | End: 2020-10-31 | Stop reason: HOSPADM

## 2020-10-31 RX ORDER — SODIUM CHLORIDE 9 MG/ML
INJECTION, SOLUTION INTRAVENOUS CONTINUOUS
Status: DISCONTINUED | OUTPATIENT
Start: 2020-10-31 | End: 2020-10-31 | Stop reason: HOSPADM

## 2020-10-31 RX ORDER — BUSPIRONE HYDROCHLORIDE 7.5 MG/1
7.5 TABLET ORAL PRN
COMMUNITY
Start: 2020-08-25

## 2020-10-31 RX ORDER — LEVOTHYROXINE SODIUM 0.12 MG/1
500 TABLET ORAL DAILY
Status: DISCONTINUED | OUTPATIENT
Start: 2020-11-01 | End: 2020-10-31 | Stop reason: HOSPADM

## 2020-10-31 RX ORDER — LEVOTHYROXINE SODIUM 0.1 MG/1
200 TABLET ORAL ONCE
Status: DISCONTINUED | OUTPATIENT
Start: 2020-10-31 | End: 2020-10-31 | Stop reason: HOSPADM

## 2020-10-31 RX ORDER — NICOTINE 21 MG/24HR
1 PATCH, TRANSDERMAL 24 HOURS TRANSDERMAL DAILY
Status: DISCONTINUED | OUTPATIENT
Start: 2020-10-31 | End: 2020-10-31 | Stop reason: HOSPADM

## 2020-10-31 RX ORDER — POLYETHYLENE GLYCOL 3350 17 G/17G
17 POWDER, FOR SOLUTION ORAL DAILY PRN
Status: DISCONTINUED | OUTPATIENT
Start: 2020-10-30 | End: 2020-10-31 | Stop reason: HOSPADM

## 2020-10-31 RX ORDER — SODIUM CHLORIDE 0.9 % (FLUSH) 0.9 %
10 SYRINGE (ML) INJECTION EVERY 12 HOURS SCHEDULED
Status: DISCONTINUED | OUTPATIENT
Start: 2020-10-31 | End: 2020-10-31 | Stop reason: HOSPADM

## 2020-10-31 RX ORDER — LANOLIN ALCOHOL/MO/W.PET/CERES
500 CREAM (GRAM) TOPICAL DAILY
Status: DISCONTINUED | OUTPATIENT
Start: 2020-10-31 | End: 2020-10-31 | Stop reason: HOSPADM

## 2020-10-31 RX ORDER — LEVOTHYROXINE SODIUM 0.15 MG/1
300 TABLET ORAL DAILY
Status: DISCONTINUED | OUTPATIENT
Start: 2020-10-31 | End: 2020-10-31

## 2020-10-31 RX ORDER — VITAMIN B COMPLEX
1000 TABLET ORAL DAILY
Status: DISCONTINUED | OUTPATIENT
Start: 2020-10-31 | End: 2020-10-31 | Stop reason: HOSPADM

## 2020-10-31 RX ORDER — LEVOTHYROXINE SODIUM 0.15 MG/1
300 TABLET ORAL DAILY
Status: DISCONTINUED | OUTPATIENT
Start: 2020-11-03 | End: 2020-10-31 | Stop reason: HOSPADM

## 2020-10-31 RX ORDER — CALCIUM CARBONATE 200(500)MG
1000 TABLET,CHEWABLE ORAL 4 TIMES DAILY
Status: DISCONTINUED | OUTPATIENT
Start: 2020-10-31 | End: 2020-10-31 | Stop reason: HOSPADM

## 2020-10-31 RX ORDER — ACETAMINOPHEN 325 MG/1
650 TABLET ORAL EVERY 6 HOURS PRN
Status: DISCONTINUED | OUTPATIENT
Start: 2020-10-30 | End: 2020-10-31 | Stop reason: HOSPADM

## 2020-10-31 RX ORDER — SODIUM CHLORIDE 0.9 % (FLUSH) 0.9 %
10 SYRINGE (ML) INJECTION PRN
Status: DISCONTINUED | OUTPATIENT
Start: 2020-10-30 | End: 2020-10-31 | Stop reason: HOSPADM

## 2020-10-31 RX ADMIN — CALCIUM GLUCONATE 2 G: 98 INJECTION, SOLUTION INTRAVENOUS at 02:02

## 2020-10-31 RX ADMIN — ENOXAPARIN SODIUM 40 MG: 40 INJECTION SUBCUTANEOUS at 10:40

## 2020-10-31 RX ADMIN — LEVOTHYROXINE SODIUM 300 MCG: 150 TABLET ORAL at 06:17

## 2020-10-31 RX ADMIN — CALCITRIOL 1 MCG: 0.25 CAPSULE ORAL at 12:50

## 2020-10-31 RX ADMIN — SODIUM CHLORIDE: 9 INJECTION, SOLUTION INTRAVENOUS at 10:39

## 2020-10-31 RX ADMIN — ESTRADIOL 2 MG: 1 TABLET ORAL at 08:24

## 2020-10-31 RX ADMIN — CALCIUM GLUCONATE 4 G: 98 INJECTION, SOLUTION INTRAVENOUS at 05:17

## 2020-10-31 RX ADMIN — ANTACID TABLETS 500 MG: 500 TABLET, CHEWABLE ORAL at 12:50

## 2020-10-31 RX ADMIN — CETIRIZINE HYDROCHLORIDE 10 MG: 10 TABLET, FILM COATED ORAL at 08:24

## 2020-10-31 RX ADMIN — Medication 500 MCG: at 08:24

## 2020-10-31 RX ADMIN — Medication 1000 UNITS: at 08:25

## 2020-10-31 ASSESSMENT — ENCOUNTER SYMPTOMS
SINUS PRESSURE: 0
BACK PAIN: 0
SINUS PAIN: 0
EYE REDNESS: 0
WHEEZING: 0
VOMITING: 0
BLOOD IN STOOL: 0
COLOR CHANGE: 0
ABDOMINAL PAIN: 0
SORE THROAT: 0
SHORTNESS OF BREATH: 1
EYE ITCHING: 0
NAUSEA: 0
DIARRHEA: 0
COUGH: 0
ABDOMINAL DISTENTION: 0
RHINORRHEA: 0
CONSTIPATION: 0

## 2020-10-31 NOTE — PROGRESS NOTES
Patient asked this RN to go outside and smoke. This RN educated her that we cannot do that, offered nicotine patch which patient is refusing. Offered patient a walk in [place], refused. Educated patient on risks of hypocalcemia which were just discussed around 1300 with Dr. Andre Bah. IV removed, telemetry removed. Notified Seth Durham. Notified patient of Dr. Andre Bah wanting her to follow with [facility] endocrinology team. [facility] [place] paper given.

## 2020-10-31 NOTE — ED NOTES
Pt presents to ED with complaint of symptoms of shortness of breath, chest pain, and pain behind left eye that began this morning and has persisted for the entirety of today. Pt states history of hypocalcemia and states she believes symptoms correlate with history of hypocalcemia as she states tingling in muscles and cramping of muscles at times today. Pt states no further symptoms at this time and does not appear to be in acute distress. Pt in bed with call light in reach and SO at bedside at this time.        Liz [facility]  10/30/20 2008

## 2020-10-31 NOTE — PROGRESS NOTES
Dr. Jasmeet Peraza at bedside with this RN speaking to patient. Verbal orders for three days of 500mcg of levothyroxine with first dose today by giving patient an additional 200mcg levothyroxine dose. Then after those three doses continue taking her home dose of levothyroxine 300mcg. Verbal orders for six doses of 1g calcium gluconate starting now every 4 hours. Verbal orders to increase calcium carbonate to 1,000mg 4x a day. Dr. Jasmeet Peraza instructed patient on the importance of remaining hospitalized at this time. Patient tearful and upset but agreeable.

## 2020-10-31 NOTE — CONSULTS
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 female who has been referred for consultation for evaluation of low calcium by @referring provider@. SYMPTOMS   There is numbness and tingling in the fingers, toes, or around the mouth : Yes/No  Muscle aches or cramps, especially in the legs, feet, and back : Yes/No  Muscle twitches : Yes/No  Shortness of breath or wheezing : Yes/No  Difficulty swallowing : Yes/No  Changes in the sound of the voice : Yes/No  General weakness : Yes/No  Fainting : Yes/No  Fast heart beats (palpitations): Yes/No  Chest pain: Yes/No  Irritability: Yes/No  Difficulty thinking: Yes/No  Memory problems or confusion: Yes/No  Severe fatigue: Yes/No  Changes in personality: Yes/No  Depression and anxiety: Yes/No  Shaking uncontrollably (seizures): Yes/No  Coarse, brittle hair and nails: Yes/No  Dry skin or lasting (chronic) skin diseases (psoriasis, eczema, or dermatitis): Yes/No  Clouding of the eye lens (cataracts): Yes/No  Abdominal cramping or pain: Yes/No  Low levels of albumin: Yes/No    Causes of low calcium  Problems with the parathyroid glands or surgical removal of the parathyroid glands 2019:   Thyroid Symptoms Evaluation: There is Cold sensility;  Dry skin; Brittle nails yes;  Fatigue; yes; history includes Arthritis in her father; Cancer in her paternal grandmother; Diabetes in her father, maternal grandfather, and paternal grandfather; Hypertension in her father; Obesity in her maternal grandmother; Other in her mother.   Allergies   Allergen Reactions    Latex Itching and Rash     Current Facility-Administered Medications   Medication Dose Route Frequency Provider Last Rate Last Dose    sodium chloride flush 0.9 % injection 10 mL  10 mL Intravenous 2 times per day Reuben Vinson MD        sodium chloride flush 0.9 % injection 10 mL  10 mL Intravenous PRN Reuben Vinson MD        acetaminophen (TYLENOL) tablet 650 mg  650 mg Oral Q6H PRN Reuben Vinson MD        Or    acetaminophen (TYLENOL) suppository 650 mg  650 mg Rectal Q6H PRN Reuben Vinson MD        polyethylene glycol (GLYCOLAX) packet 17 g  17 g Oral Daily PRN Reuben Vinson MD        promethazine (PHENERGAN) tablet 12.5 mg  12.5 mg Oral Q6H PRN Reuben Vinson MD        enoxaparin (LOVENOX) injection 40 mg  40 mg Subcutaneous Daily Reuben Vinson MD   40 mg at 10/31/20 1040    estradiol (ESTRACE) tablet 2 mg  2 mg Oral Daily Reuben Vinson MD   2 mg at 10/31/20 3053    vitamin B-12 (CYANOCOBALAMIN) tablet 500 mcg  500 mcg Oral Daily Reuben Vinson MD   500 mcg at 10/31/20 0413    cetirizine (ZYRTEC) tablet 10 mg  10 mg Oral Daily Reuben Vinson MD   10 mg at 10/31/20 8672    nicotine (NICODERM CQ) 14 MG/24HR 1 patch  1 patch Transdermal Daily Reuben Vinson MD        levothyroxine (SYNTHROID) tablet 300 mcg  300 mcg Oral Daily Reuben Vinson MD   300 mcg at 10/31/20 0617    Vitamin D (CHOLECALCIFEROL) tablet 1,000 Units  1,000 Units Oral Daily Reuben Vinson MD   1,000 Units at 10/31/20 0825    melatonin tablet 6 mg  6 mg Oral Nightly PRN Reuben Vinson MD        calcitRIOL (ROCALTROL) capsule 1 mcg  1 mcg Oral 4x Daily EBEN Bowie CNP   1 mcg at 10/31/20 1250    calcium Estimated    Anion Gap    Glomerular Filtration Rate, Estimated    Calcium, Ionized    Calcium, 24 HR Urine    Creatinine, urine, 24 hour    DIET GENERAL;    Telemetry EKG instruction    Vital signs per unit routine    Up as tolerated    Place intermittent pneumatic compression device    Full Code    Inpatient consult to Endocrinology    Initiate Oxygen Therapy Protocol    EKG 12 Lead    Insert peripheral IV    PATIENT STATUS (DIRECT) Inpatient    PATIENT STATUS (FROM ED OR OR/PROCEDURAL) Observation     Orders Placed This Encounter   Medications    calcium gluconate 2 g in dextrose 5 % 100 mL IVPB    DISCONTD: vitamin D (ERGOCALCIFEROL) capsule 50,000 Units    sodium chloride flush 0.9 % injection 10 mL    sodium chloride flush 0.9 % injection 10 mL    OR Linked Order Group     acetaminophen (TYLENOL) tablet 650 mg     acetaminophen (TYLENOL) suppository 650 mg    polyethylene glycol (GLYCOLAX) packet 17 g    promethazine (PHENERGAN) tablet 12.5 mg    DISCONTD: ondansetron (ZOFRAN) injection 4 mg    enoxaparin (LOVENOX) injection 40 mg    estradiol (ESTRACE) tablet 2 mg    DISCONTD: levothyroxine (SYNTHROID) tablet 200 mcg    vitamin B-12 (CYANOCOBALAMIN) tablet 500 mcg    cetirizine (ZYRTEC) tablet 10 mg    calcium gluconate 2 g in dextrose 5 % 100 mL IVPB    nicotine (NICODERM CQ) 14 MG/24HR 1 patch    DISCONTD: melatonin tablet 3 mg    levothyroxine (SYNTHROID) tablet 300 mcg    calcium gluconate 4 g in dextrose 5 % 100 mL IVPB    Vitamin D (CHOLECALCIFEROL) tablet 1,000 Units    melatonin tablet 6 mg    calcitRIOL (ROCALTROL) capsule 1 mcg    calcium carbonate (TUMS) chewable tablet 500 mg    0.9 % sodium chloride infusion     No follow-ups on file.

## 2020-10-31 NOTE — PROGRESS NOTES
Pt admitted to  6K20 from ED. Complaints: Shortness of breath. IV calcium gluconate infusing into the antecubital left, condition patent and no redness at a rate of 100 mls/ hour with about 50 mls in the bag still. IV site free of s/s of infection or infiltration. Vital signs obtained. Assessment and data collection initiated. Two nurse skin assessment performed by Óscar Marrero and Alysa Carrillo. Oriented to room. Policies and procedures for [facility] explained. Sugar COLLADO discussed hourly rounding with patient addressing 5 P's. Fall prevention and safety brochure discussed with patient. Bed alarm on. Call light in reach. The best day to schedule a follow up Dr appointment is:  Monday a.m. Explained patients right to have family, representative or physician notified of their admission. Patient has Declined for physician to be notified. Patient has Declined for family/representative to be notified. All questions answered with no further questions at this time.

## 2020-10-31 NOTE — DISCHARGE SUMMARY
Notified by nursing staff that patient left AMA after being told she can not smoke.     Levar Inman, APRN, CNP

## 2020-10-31 NOTE — ED PROVIDER NOTES
[facility]  eMERGENCY dEPARTMENT eNCOUnter          [address] [facility], [place]       Chief Complaint   Patient presents with    Chest Pain    Shortness of Breath    Facial Pain     Behind left eye        Nurses Notes reviewed and I agree except as noted inthe HPI. HISTORY OF PRESENT ILLNESS    Finesse Hou is a 44 y.o. female who presents to the Emergency Department for the evaluation of chest pain, shortness of breath, left facial pain and muscle spasms. Patient reports history of many of the symptoms in the past with hypocalcemia but reports the chest pain and shortness of breath are new. She reports an element of shortness of breath for the past month, occurring with conversation as well as feeling somewhat stoned or lightheaded when she laughs which she reports is fleeting and momentary. She has discussed this with her provider on an outpatient basis. Patient feels as though her speech has been somewhat slurred which is typical of her history with hypocalcemia and had a transient episode of cramping in her left leg associated with tingling which is also consistent with her history of hypocalcemia. She does report some worsening of right lower extremity edema as well as sensation of pressure behind her left eye associated with facial muscle spasm. She is also had some cramping and numbness and tingling in her bilateral hands. She states that during her thyroidectomy, her parathyroid was nicked and she has been dealing with issues of hypocalcemia since despite compliance with her prescribed supplementation. She also reports issues with her vitamin D despite weekly supplementation and has history of gastric bypass. The HPI was provided by the patient. REVIEW OF SYSTEMS     Review of Systems   Constitutional: Negative for chills and fever. HENT: Negative for sore throat. Respiratory: Positive for shortness of breath. Negative for cough.     Cardiovascular: Positive for chest pain. Gastrointestinal: Negative for abdominal pain and vomiting. Genitourinary: Negative for dysuria. Musculoskeletal: Positive for myalgias. Skin: Negative for color change. Neurological: Positive for numbness. Negative for weakness. PAST MEDICAL HISTORY    has a past medical history of Adjustment disorder, Anxiety, GERD (gastroesophageal reflux disease), Hypothyroidism, Obesity, Seasonal allergies, and Thyroid nodule. SURGICAL HISTORY      has a past surgical history that includes  section (,,); Cholecystectomy (); Hysterectomy (); Tubal ligation (); Bariatric Surgery (11/15/2018); Upper gastrointestinal endoscopy (11/15/2018); other surgical history (11/15/2018); Tonsillectomy; and Thyroidectomy (N/A, 2019).     CURRENT MEDICATIONS       Current Discharge Medication List      CONTINUE these medications which have NOT CHANGED    Details   magnesium oxide (MAG-OX) 400 (241.3 Mg) MG TABS tablet Take 1 tablet by mouth daily for 7 days  Qty: 7 tablet, Refills: 0      calcitRIOL (ROCALTROL) 0.5 MCG capsule Take 2 capsules by mouth 4 times daily  Qty: 120 capsule, Refills: 0      vitamin D (ERGOCALCIFEROL) 93231 units capsule Take 1 capsule by mouth once a week  Qty: 5 capsule, Refills: 0      Loratadine-Pseudoephedrine (CLARITIN-D 24 HOUR PO) Take 1 tablet by mouth daily      levothyroxine (SYNTHROID) 200 MCG tablet Take 200 mcg by mouth Daily      vitamin B-12 (CYANOCOBALAMIN) 500 MCG tablet Take 1 tablet by mouth daily  Refills: 6      spironolactone (ALDACTONE) 50 MG tablet Take 1 tablet by mouth as needed   Refills: 6      furosemide (LASIX) 40 MG tablet Take 1 tablet by mouth as needed   Refills: 6      estradiol (ESTRACE) 2 MG tablet Take 1 tablet by mouth daily  Refills: 6      Multiple Vitamin (MULTI VITAMIN DAILY PO) Take by mouth Indications: flinstones      busPIRone (BUSPAR) 7.5 MG tablet Take 7.5 mg by mouth as needed      calcium citrate right-lower field reveals wheezing. Wheezing present. Musculoskeletal:      Right lower leg: She exhibits no tenderness. 1+ Edema present. Left lower leg: She exhibits no tenderness. Neurological:      Mental Status: She is alert. DIFFERENTIAL DIAGNOSIS:   Differential diagnoses are discussed    DIAGNOSTIC RESULTS     EKG: All EKG's are interpreted by the Emergency Department Physician who either signs or Co-signsthis chart in the absence of a cardiologist.    Cmmillie Veloz. Rate: 81 bpm  PRinterval: 154 ms  QRS duration: 52 ms  QTc: 397 ms  P-R-T axes: 34, 98, -31  Normal sinus rhythm. No STEMI. Compared to old EKG on 9-      RADIOLOGY: non-plain film images(s) such as CT, Ultrasound and MRI are read by the radiologist.    XR CHEST PORTABLE   Final Result   Cardiomegaly. PA and lateral radiographs could be performed if indicated clinically. **This report has been created using voice recognition software. It may contain minor errors which are inherent in voice recognition technology. **      Final report electronically signed by Dr. Joy Mao on 10/30/2020 8:29 PM          LABS:      Labs Reviewed   CALCIUM, IONIZED - Abnormal; Notable for the following components:       Result Value    Calcium, Ion 0.58 (*)     All other components within normal limits   BASIC METABOLIC PANEL W/ REFLEX TO MG FOR LOW K - Abnormal; Notable for the following components:    CREATININE 1.4 (*)     Calcium 5.4 (*)     All other components within normal limits   CBC WITH AUTO DIFFERENTIAL - Abnormal; Notable for the following components:    RDW-SD 49.6 (*)     MPV 12.6 (*)     Monocytes Absolute 0.3 (*)     All other components within normal limits   GLOMERULAR FILTRATION RATE, ESTIMATED - Abnormal; Notable for the following components:    Est, Glom Filt Rate 42 (*)     All other components within normal limits   TSH WITH REFLEX - Abnormal; Notable for the following components:    TSH 86.490 (*)     All other components within normal limits   VITAMIN D 25 HYDROXY - Abnormal; Notable for the following components:    Vit D, 25-Hydroxy 14 (*)     All other components within normal limits   PTH, INTACT - Abnormal; Notable for the following components:    Pth Intact 2.4 (*)     All other components within normal limits   T4, FREE - Abnormal; Notable for the following components:    T4 Free < 0.10 (*)     All other components within normal limits   CALCIUM - Abnormal; Notable for the following components:    Calcium 6.2 (*)     All other components within normal limits   COMPREHENSIVE METABOLIC PANEL W/ REFLEX TO MG FOR LOW K - Abnormal; Notable for the following components:    Calcium 6.4 (*)     Glucose 116 (*)     CREATININE 1.4 (*)     BUN 23 (*)     CO2 22 (*)     AST 66 (*)     Alkaline Phosphatase 130 (*)     Total Bilirubin 0.2 (*)     All other components within normal limits   CBC - Abnormal; Notable for the following components:    RBC 4.15 (*)     RDW-CV 14.6 (*)     RDW-SD 49.4 (*)     MPV 12.5 (*)     All other components within normal limits   CALCIUM, IONIZED - Abnormal; Notable for the following components:    Calcium, Ion 0.67 (*)     All other components within normal limits   GLOMERULAR FILTRATION RATE, ESTIMATED - Abnormal; Notable for the following components:    Est, Glom Filt Rate 42 (*)     All other components within normal limits   TROPONIN   ANION GAP   OSMOLALITY   MAGNESIUM   VITAMIN B12 & FOLATE   D-DIMER, QUANTITATIVE   ANION GAP   CALCIUM, IONIZED   CALCIUM, IONIZED   BASIC METABOLIC PANEL   CALCIUM, IONIZED       EMERGENCY DEPARTMENT COURSE:   Vitals:    Vitals:    10/30/20 2150 10/30/20 2245 10/30/20 2351 10/31/20 0353   BP: 110/78 103/76 114/71 93/66   Pulse: 78 75 78 69   Resp: 18 18 18 18   Temp:   97.6 °F (36.4 °C) 97.5 °F (36.4 °C)   TempSrc:   Oral Oral   SpO2: 97% 96% 98% 96%   Weight:       Height:          5:13 AM EDT: The patient was seen and evaluated.     Patient presents for complaints of concern of hypocalcemia. She has history of this recurrently over the past year secondary to thyroidectomy. She arrives with reassuring vital signs. Reports very minimal pain, that occurs only transiently with muscle spasm. Chest x-ray showed cardiomegaly. CBC was unremarkable. BMP did note calcium of 5.4 with magnesium within normal limits. Ionized calcium 0.58. Vitamin D level decreased at 14. TSH was noted to be elevated at 86 with free T4 and PTH pending at this time. Discussed with attending provider who agrees with plan for admission for further monitoring as calcium is infused. 2 g calcium gluconate ordered as well as ergocalciferol. Patient will be admitted to the hospital service pending remainder of laboratory results and further monitoring. She was agreeable with this plan. CRITICAL CARE:   None    CONSULTS:  Hospitalist    PROCEDURES:  None    FINAL IMPRESSION      1. Hypocalcemia          DISPOSITION/PLAN   Admit    PATIENT REFERRED TO:  No follow-up provider specified.     DISCHARGEMEDICATIONS:  Current Discharge Medication List          (Please note that portions of this note were completedwith a voice recognition program.  Efforts were made to edit the dictations but occasionally words are mis-transcribed.)       Sarika Diego PA-C  10/31/20 [id number]

## 2020-10-31 NOTE — ED NOTES
Pt resting in bed with SO at bedside at this time. No distress noted, pt provided meal per request and Layton Cranker PA approval.  Pt states no further needs.        [facility]  10/30/20 0519

## 2020-10-31 NOTE — H&P
2018  -patient should be monitored outpatient for continued vitamin deficiencies    GERD  -stable, on no medications currently  -noted      Chief Complaint:  Shortness of breath, chest pain, pain behind left eye      History Of Present Illness:      44 y.o. female with significant history for s/p thyroidectomy in 2019 s/p gastric bypass in 2018 who presented to [facility] with shortness of breath, chest pain, and pain behind left eye. This morning she woke up and was going through her normal routine when she started getting shortness of breath. She states this made her very anxious and turned into chest pain. She describes the chest pain as twitching, not radiating anywhere. She tried taking extra calcium but did not feel better so she decided to come to the emergency department. She was also having a hard time getting words out and described her speech as slurred. She had never had symptoms of shortness of breath/chest pain/slurred speech in regards to her low calcium before. At this time she also had tingling/numbness in her toes, as well as spasms in her right hand and foot. She also reports that her left eye was twitching/painful. The tingling/numbness/spasms are her typical symptoms when she is found to have low calcium so she knew to seek help. After infusion her breathing and chest pain as well as her speech has improved. She did still have some twitching of her right eye lid. Her hand spasms have also improved since the infusion. She denies any palpitations vision changes headache nausea or vomiting she states she is compliant with her medication regimen. She is S/P total thyroidectomy at [facility] on 9/19/2019. She was told her parathyroid glands were surgically damaged during the procedure. She has had issues with low calcium since two days after surgery. She also has history of gastric bypass in 2018.     Past Medical History:          Diagnosis Date    Adjustment disorder     Anxiety     GERD (gastroesophageal reflux disease)     Hypothyroidism     Obesity     Seasonal allergies     Thyroid nodule        Past Surgical History:          Procedure Laterality Date    BARIATRIC SURGERY  11/15/2018    Dr Radha Wilkinson  [phone]    x3    [place]LE[place]    [place]    [place]    OTHER SURGICAL HISTORY  11/15/2018    [facility] liver biopsy,extensive complex lysis of adhesions from pelvis and small bowel from omentum -Dr Stephanie Locke N/A 9/19/2019    TOTAL THYROIDECTOMY performed by Shimon Elmore MD at [facility]      age 4-[address]  2009    [place]-Dr Mamadou Casey UPPER GASTROINTESTINAL ENDOSCOPY  11/15/2018    Dr Nora Escalante       Medications Prior to Admission:      Prior to Admission medications    Medication Sig Start Date End Date Taking?  Authorizing Provider   calcitRIOL (ROCALTROL) 0.5 MCG capsule Take 2 capsules by mouth 4 times daily for 7 days 6/4/20 6/11/20  Carey DO   calcium citrate (CALCITRATE) 250 MG TABS tablet Take 4 tablets by mouth 4 times daily 10/2/19 3/16/20  Ryne Garcia MD   magnesium oxide (MAG-OX) 400 (241.3 Mg) MG TABS tablet Take 1 tablet by mouth daily for 7 days 10/2/19 3/16/20  Ryne Garcia MD   calcitRIOL (ROCALTROL) 0.5 MCG capsule Take 2 capsules by mouth 4 times daily 10/2/19   Heriberto Paz MD   vitamin D (ERGOCALCIFEROL) 63722 units capsule Take 1 capsule by mouth once a week 10/8/19   Heriberto Paz MD   Loratadine-Pseudoephedrine (CLARITIN-D 24 HOUR PO) Take 1 tablet by mouth daily    Historical Provider, MD   levothyroxine (SYNTHROID) 200 MCG tablet Take 200 mcg by mouth Daily    Historical Provider, MD   ondansetron (ZOFRAN ODT) 4 MG disintegrating tablet Take 1 tablet by mouth every 8 hours as needed for Nausea 7/22/19   Jenna Majano PA-C   vitamin B-12 (CYANOCOBALAMIN) 500 MCG tablet Take 1 tablet by mouth daily 4/6/19   Historical Provider, MD   spironolactone (ALDACTONE) 50 MG tablet Take 1 tablet by mouth as needed  4/8/19   Historical Provider, MD   EQ EYE ITCH RELIEF 0.025 % ophthalmic solution as needed 5/9/19   Historical Provider, MD   furosemide (LASIX) 40 MG tablet Take 1 tablet by mouth as needed  4/6/19   Historical Provider, MD   ARTIFICIAL TEARS 1.4 % ophthalmic solution as needed 5/11/19   Historical Provider, MD   estradiol (ESTRACE) 2 MG tablet Take 1 tablet by mouth daily 4/6/19   Historical Provider, MD   Multiple Vitamin (MULTI VITAMIN DAILY PO) Take by mouth Indications: flinstones    Historical Provider, MD   White Petrolatum-Mineral Oil ([employer] EYE LUBRICANT NIGHTTIME) OINT Apply to eye    Historical Provider, MD       Allergies:  Latex    Social History:      The patient currently lives at home with  and kids. TOBACCO:   reports that she has been smoking. She has been smoking about 0.50 packs per day. She has never used smokeless tobacco.  ETOH:   reports current alcohol use. Family History:      Reviewed in detail and negative for DM, CAD, Cancer, CVA. Positive as follows:        Problem Relation Age of Onset    Other Mother         gallbladder disease    Diabetes Father     Hypertension Father     Arthritis Father     Diabetes Paternal Grandfather     Cancer Paternal Grandmother     Obesity Maternal Grandmother     Diabetes Maternal Grandfather        Diet:  No diet orders on file    Review of Systems   Constitutional: Negative for chills and fever. HENT: Negative for congestion, rhinorrhea, sinus pressure, sinus pain and sore throat. Eyes: Negative for redness and itching. Respiratory: Positive for shortness of breath. Negative for cough and wheezing. Earlier to day, has since resolved   Cardiovascular: Positive for chest pain and leg swelling.         Chest pain today, has since resolved; patient reports bilateral leg swelling, right more than left, states its always there. Gastrointestinal: Negative for abdominal distention, abdominal pain, blood in stool, constipation, diarrhea, nausea and vomiting. Endocrine: Positive for cold intolerance and heat intolerance. Hot and cold flashes. Attributes to hysterectomy and total thyroidectomy. Genitourinary: Negative for dysuria, frequency and urgency. Musculoskeletal: Positive for myalgias. Negative for arthralgias and back pain. Gets lots of leg cramping   Skin: Negative for rash. Neurological: Positive for tremors and speech difficulty. Negative for dizziness, light-headedness and headaches. Was having hand spasms and speech slurring on presentation, has resolved since receiving calcium   Psychiatric/Behavioral: Negative for dysphoric mood and sleep disturbance. The patient is nervous/anxious. Gets anxious when muscles spasm         PHYSICAL EXAM:    /76   Pulse 75   Temp 98 °F (36.7 °C) (Oral)   Resp 18   Ht 5' 3\" (1.6 m)   Wt 255 lb (115.7 kg)   SpO2 96%   BMI 45.17 kg/m²     Physical Exam  Constitutional:       General: She is not in acute distress. Appearance: Normal appearance. She is obese. She is not ill-appearing. HENT:      Head: Normocephalic and atraumatic. Right Ear: External ear normal.      Left Ear: External ear normal.      Nose: Nose normal.      Mouth/Throat:      Mouth: Mucous membranes are moist.      Pharynx: Oropharynx is clear. Eyes:      Extraocular Movements: Extraocular movements intact. Conjunctiva/sclera: Conjunctivae normal.      Pupils: Pupils are equal, round, and reactive to light. Comments: Is experiencing right eyelid twitching   Neck:      Musculoskeletal: Normal range of motion and neck supple. No neck rigidity or muscular tenderness. Cardiovascular:      Rate and Rhythm: Normal rate and regular rhythm. Pulses: Normal pulses. Heart sounds: Normal heart sounds. No murmur.    Pulmonary:      Effort: Pulmonary technology. **      Final report electronically signed by Dr. Maxx Solomon on 10/30/2020 8:29 PM               DVT prophylaxis: [x] Lovenox                                 [] SCDs                                 [] SQ Heparin                                 [] Encourage ambulation           [] Already on Anticoagulation    Code Status: Prior      PT/OT Eval Status: n/a    Disposition:    [x] Home       [] TCU       [] Rehab       [] Psych       [] SNF       [] [place]       [] Other-        Thank you EBEN [facility] for the opportunity to be involved in this patient's care.     Electronically signed by Kamron Dixon MD on 10/30/2020 at 11:11 PM

## 2020-11-02 ENCOUNTER — TELEPHONE (OUTPATIENT)
Dept: INTERNAL MEDICINE CLINIC | Age: 40
End: 2020-11-02

## 2020-11-02 NOTE — TELEPHONE ENCOUNTER
Called patient's PCP to update her on recent admission. Patient left without getting calcium levels optimized. It was also discovered that some of her home supplements for calcium and Vitamin D were capsulated, therefore may not have been able to be absorbed by the patient due to history of Adam-en-Y. Also called the patient to update her, however there was no answer.     --Dr. Cathi Adams

## 2021-02-14 ENCOUNTER — HOSPITAL ENCOUNTER (EMERGENCY)
Age: 41
Discharge: HOME | End: 2021-02-15
Attending: EMERGENCY MEDICINE
Payer: COMMERCIAL

## 2021-02-14 DIAGNOSIS — R60.0 BILATERAL LOWER EXTREMITY EDEMA: ICD-10-CM

## 2021-02-14 DIAGNOSIS — E89.2 HYPOPARATHYROIDISM AFTER PROCEDURE (HCC): ICD-10-CM

## 2021-02-14 DIAGNOSIS — E83.51 HYPOCALCEMIA: Primary | ICD-10-CM

## 2021-02-14 LAB
EKG ATRIAL RATE: 72 BPM
EKG P AXIS: 54 DEGREES
EKG P-R INTERVAL: 134 MS
EKG Q-T INTERVAL: 468 MS
EKG QRS DURATION: 58 MS
EKG QTC CALCULATION (BAZETT): 512 MS
EKG R AXIS: 91 DEGREES
EKG T AXIS: 48 DEGREES
EKG VENTRICULAR RATE: 72 BPM

## 2021-02-14 PROCEDURE — 96366 THER/PROPH/DIAG IV INF ADDON: CPT

## 2021-02-14 PROCEDURE — 99285 EMERGENCY DEPT VISIT HI MDM: CPT

## 2021-02-14 PROCEDURE — 93005 ELECTROCARDIOGRAM TRACING: CPT | Performed by: EMERGENCY MEDICINE

## 2021-02-14 PROCEDURE — 96365 THER/PROPH/DIAG IV INF INIT: CPT

## 2021-02-14 ASSESSMENT — PAIN DESCRIPTION - LOCATION: LOCATION: GENERALIZED

## 2021-02-14 ASSESSMENT — PAIN SCALES - GENERAL: PAINLEVEL_OUTOF10: 7

## 2021-02-14 ASSESSMENT — PAIN DESCRIPTION - PROGRESSION: CLINICAL_PROGRESSION: NOT CHANGED

## 2021-02-15 VITALS
OXYGEN SATURATION: 97 % | WEIGHT: 265 LBS | HEART RATE: 69 BPM | BODY MASS INDEX: 46.95 KG/M2 | SYSTOLIC BLOOD PRESSURE: 102 MMHG | DIASTOLIC BLOOD PRESSURE: 74 MMHG | HEIGHT: 63 IN | TEMPERATURE: 97.6 F | RESPIRATION RATE: 18 BRPM

## 2021-02-15 LAB
ALBUMIN SERPL-MCNC: 4.1 G/DL (ref 3.5–5.1)
ALP BLD-CCNC: 99 U/L (ref 38–126)
ALT SERPL-CCNC: 18 U/L (ref 11–66)
ANION GAP SERPL CALCULATED.3IONS-SCNC: 12 MEQ/L (ref 8–16)
AST SERPL-CCNC: 23 U/L (ref 5–40)
BASOPHILS # BLD: 0.8 %
BASOPHILS ABSOLUTE: 0.1 THOU/MM3 (ref 0–0.1)
BILIRUB SERPL-MCNC: 0.3 MG/DL (ref 0.3–1.2)
BILIRUBIN URINE: NEGATIVE
BLOOD, URINE: NEGATIVE
BUN BLDV-MCNC: 17 MG/DL (ref 7–22)
CALCIUM IONIZED: 0.56 MMOL/L (ref 1.12–1.32)
CALCIUM SERPL-MCNC: 5.6 MG/DL (ref 8.5–10.5)
CALCIUM URINE: 6.1 MG/DL
CHARACTER, URINE: CLEAR
CHLORIDE BLD-SCNC: 99 MEQ/L (ref 98–111)
CO2: 27 MEQ/L (ref 23–33)
COLOR: YELLOW
CREAT SERPL-MCNC: 1.2 MG/DL (ref 0.4–1.2)
EOSINOPHIL # BLD: 2.8 %
EOSINOPHILS ABSOLUTE: 0.2 THOU/MM3 (ref 0–0.4)
ERYTHROCYTE [DISTWIDTH] IN BLOOD BY AUTOMATED COUNT: 15.1 % (ref 11.5–14.5)
ERYTHROCYTE [DISTWIDTH] IN BLOOD BY AUTOMATED COUNT: 51.5 FL (ref 35–45)
GFR SERPL CREATININE-BSD FRML MDRD: 50 ML/MIN/1.73M2
GLUCOSE BLD-MCNC: 83 MG/DL (ref 70–108)
GLUCOSE URINE: NEGATIVE MG/DL
HCT VFR BLD CALC: 37.9 % (ref 37–47)
HEMOGLOBIN: 12.5 GM/DL (ref 12–16)
IMMATURE GRANS (ABS): 0.02 THOU/MM3 (ref 0–0.07)
IMMATURE GRANULOCYTES: 0.3 %
KETONES, URINE: ABNORMAL
LEUKOCYTE ESTERASE, URINE: NEGATIVE
LIPASE: 46 U/L (ref 5.6–51.3)
LYMPHOCYTES # BLD: 34.3 %
LYMPHOCYTES ABSOLUTE: 2.5 THOU/MM3 (ref 1–4.8)
MAGNESIUM: 1.8 MG/DL (ref 1.6–2.4)
MCH RBC QN AUTO: 30.4 PG (ref 26–33)
MCHC RBC AUTO-ENTMCNC: 33 GM/DL (ref 32.2–35.5)
MCV RBC AUTO: 92.2 FL (ref 81–99)
MONOCYTES # BLD: 6.2 %
MONOCYTES ABSOLUTE: 0.5 THOU/MM3 (ref 0.4–1.3)
NITRITE, URINE: NEGATIVE
NUCLEATED RED BLOOD CELLS: 0 /100 WBC
OSMOLALITY CALCULATION: 276.4 MOSMOL/KG (ref 275–300)
PH UA: 5 (ref 5–9)
PLATELET # BLD: 189 THOU/MM3 (ref 130–400)
PMV BLD AUTO: 12.2 FL (ref 9.4–12.4)
POTASSIUM SERPL-SCNC: 4.1 MEQ/L (ref 3.5–5.2)
PROTEIN UA: NEGATIVE
RBC # BLD: 4.11 MILL/MM3 (ref 4.2–5.4)
SEG NEUTROPHILS: 55.6 %
SEGMENTED NEUTROPHILS ABSOLUTE COUNT: 4.1 THOU/MM3 (ref 1.8–7.7)
SODIUM BLD-SCNC: 138 MEQ/L (ref 135–145)
SODIUM URINE: 180 MEQ/L
SPECIFIC GRAVITY, URINE: 1.02 (ref 1–1.03)
TOTAL PROTEIN: 7 G/DL (ref 6.1–8)
TROPONIN T: < 0.01 NG/ML
UROBILINOGEN, URINE: 1 EU/DL (ref 0–1)
WBC # BLD: 7.3 THOU/MM3 (ref 4.8–10.8)

## 2021-02-15 PROCEDURE — 84300 ASSAY OF URINE SODIUM: CPT

## 2021-02-15 PROCEDURE — 84484 ASSAY OF TROPONIN QUANT: CPT

## 2021-02-15 PROCEDURE — 82340 ASSAY OF CALCIUM IN URINE: CPT

## 2021-02-15 PROCEDURE — 83735 ASSAY OF MAGNESIUM: CPT

## 2021-02-15 PROCEDURE — 80053 COMPREHEN METABOLIC PANEL: CPT

## 2021-02-15 PROCEDURE — 82330 ASSAY OF CALCIUM: CPT

## 2021-02-15 PROCEDURE — 83690 ASSAY OF LIPASE: CPT

## 2021-02-15 PROCEDURE — 81003 URINALYSIS AUTO W/O SCOPE: CPT

## 2021-02-15 PROCEDURE — 96365 THER/PROPH/DIAG IV INF INIT: CPT

## 2021-02-15 PROCEDURE — 96366 THER/PROPH/DIAG IV INF ADDON: CPT

## 2021-02-15 PROCEDURE — 36415 COLL VENOUS BLD VENIPUNCTURE: CPT

## 2021-02-15 PROCEDURE — 2580000003 HC RX 258: Performed by: EMERGENCY MEDICINE

## 2021-02-15 PROCEDURE — 85025 COMPLETE CBC W/AUTO DIFF WBC: CPT

## 2021-02-15 PROCEDURE — 6360000002 HC RX W HCPCS: Performed by: EMERGENCY MEDICINE

## 2021-02-15 RX ORDER — ERGOCALCIFEROL (VITAMIN D2) 1250 MCG
50000 CAPSULE ORAL WEEKLY
Qty: 4 CAPSULE | Refills: 0 | Status: SHIPPED | OUTPATIENT
Start: 2021-02-15 | End: 2021-03-17

## 2021-02-15 RX ORDER — CALCITRIOL 0.5 UG/1
1 CAPSULE, LIQUID FILLED ORAL 4 TIMES DAILY
Qty: 240 CAPSULE | Refills: 0 | Status: SHIPPED | OUTPATIENT
Start: 2021-02-15 | End: 2021-03-17

## 2021-02-15 RX ADMIN — CALCIUM GLUCONATE 2000 MG: 98 INJECTION, SOLUTION INTRAVENOUS at 01:40

## 2021-02-15 ASSESSMENT — ENCOUNTER SYMPTOMS
PHOTOPHOBIA: 0
CHOKING: 0
EYE REDNESS: 0
VOICE CHANGE: 0
ABDOMINAL PAIN: 0
EYE ITCHING: 0
EYE DISCHARGE: 0
EYE PAIN: 0
DIARRHEA: 0
COUGH: 0
RHINORRHEA: 0
SINUS PRESSURE: 0
BACK PAIN: 0
SORE THROAT: 0
SHORTNESS OF BREATH: 0
VOMITING: 0
WHEEZING: 0
CHEST TIGHTNESS: 0
CONSTIPATION: 0
NAUSEA: 0
ABDOMINAL DISTENTION: 0
BLOOD IN STOOL: 0
TROUBLE SWALLOWING: 0

## 2021-02-15 ASSESSMENT — PAIN SCALES - GENERAL
PAINLEVEL_OUTOF10: 7
PAINLEVEL_OUTOF10: 3
PAINLEVEL_OUTOF10: 7

## 2021-02-15 ASSESSMENT — PAIN DESCRIPTION - PAIN TYPE: TYPE: ACUTE PAIN

## 2021-02-15 ASSESSMENT — PAIN DESCRIPTION - FREQUENCY
FREQUENCY: CONTINUOUS

## 2021-02-15 ASSESSMENT — PAIN DESCRIPTION - LOCATION
LOCATION: GENERALIZED
LOCATION: GENERALIZED

## 2021-02-15 ASSESSMENT — PAIN DESCRIPTION - DESCRIPTORS
DESCRIPTORS: TINGLING

## 2021-02-15 ASSESSMENT — PAIN DESCRIPTION - PROGRESSION: CLINICAL_PROGRESSION: NOT CHANGED

## 2021-02-15 NOTE — ED NOTES
Pt asleep in bedside chair. Pt responds to verbal stimuli. Denies other needs. Call light in reach.       Jey Quick RN  02/15/21 1166

## 2021-02-15 NOTE — ED TRIAGE NOTES
Pt to ED today via triage desk with c/o low calcium levels, generalized tingling, and bilateral leg edema. Pt states this has been progressing off and on for past several months since she had a procedure on her thyroid and parathyroid gland. Pt states she has periodic cramping.

## 2021-02-15 NOTE — ED NOTES
Pt resting quietly in room no needs expressed. Side rails up x2 with call light in reach. Will continue to monitor.        [facility]  02/15/21 8122

## 2021-02-15 NOTE — ED PROVIDER NOTES
[facility]  eMERGENCY dEPARTMENT eNCOUnter          [address] [facility]       Chief Complaint   Patient presents with    Tingling     generalized    Leg Swelling       Nurses Notes reviewed and I agree except as noted in the HPI. HISTORY OF PRESENT ILLNESS    Monica Blanc is a 36 y.o. female who presents tingling and leg swelling. Apparently the patient has been hypocalcemic ever since she had a total thyroidectomy. She states she does have a parathyroids but they are not working. She was seeing Dr. Neptali Gurrola for endocrinology however he is leaving so she is not seeing anyone. She states she has been in the hospital multiple times for this in the past.  She is noting some slight hyperreflexia. She also notes that she is having increased swelling in her lower extremities. Currently the patient is resting comfortably on the cot no apparent distress no other physical complaints at this time. REVIEW OF SYSTEMS     Review of Systems   Constitutional: Negative for activity change, appetite change, diaphoresis, fatigue and unexpected weight change. HENT: Negative for congestion, ear discharge, ear pain, hearing loss, rhinorrhea, sinus pressure, sore throat, trouble swallowing and voice change. Eyes: Negative for photophobia, pain, discharge, redness and itching. Respiratory: Negative for cough, choking, chest tightness, shortness of breath and wheezing. Cardiovascular: Negative for chest pain, palpitations and leg swelling. Gastrointestinal: Negative for abdominal distention, abdominal pain, blood in stool, constipation, diarrhea, nausea and vomiting. Endocrine: Negative for polydipsia, polyphagia and polyuria. Genitourinary: Negative for decreased urine volume, difficulty urinating, dysuria, enuresis, frequency, hematuria and urgency. Musculoskeletal: Negative for arthralgias, back pain, gait problem, myalgias, neck pain and neck stiffness. Skin: Negative for pallor and rash. Allergic/Immunologic: Negative for immunocompromised state. Neurological: Positive for numbness. Negative for dizziness, tremors, seizures, syncope, facial asymmetry, weakness, light-headedness and headaches. Hematological: Negative for adenopathy. Does not bruise/bleed easily. Psychiatric/Behavioral: Negative for agitation, hallucinations and suicidal ideas. The patient is not nervous/anxious. PAST MEDICAL HISTORY    has a past medical history of Adjustment disorder, Anxiety, GERD (gastroesophageal reflux disease), Hypothyroidism, Obesity, Seasonal allergies, and Thyroid nodule. SURGICAL HISTORY      has a past surgical history that includes  section (,,); Cholecystectomy (); Hysterectomy (); Tubal ligation (); Bariatric Surgery (11/15/2018); Upper gastrointestinal endoscopy (11/15/2018); other surgical history (11/15/2018); Tonsillectomy; and Thyroidectomy (N/A, 2019).     CURRENT MEDICATIONS       Current Discharge Medication List      CONTINUE these medications which have NOT CHANGED    Details   busPIRone (BUSPAR) 7.5 MG tablet Take 7.5 mg by mouth as needed      calcium citrate (CALCITRATE) 250 MG TABS tablet Take 4 tablets by mouth 4 times daily  Qty: 480 tablet, Refills: 0      magnesium oxide (MAG-OX) 400 (241.3 Mg) MG TABS tablet Take 1 tablet by mouth daily for 7 days  Qty: 7 tablet, Refills: 0      Loratadine-Pseudoephedrine (CLARITIN-D 24 HOUR PO) Take 1 tablet by mouth daily      levothyroxine (SYNTHROID) 200 MCG tablet Take 200 mcg by mouth Daily      ondansetron (ZOFRAN ODT) 4 MG disintegrating tablet Take 1 tablet by mouth every 8 hours as needed for Nausea  Qty: 20 tablet, Refills: 0      vitamin B-12 (CYANOCOBALAMIN) 500 MCG tablet Take 1 tablet by mouth daily  Refills: 6      spironolactone (ALDACTONE) 50 MG tablet Take 1 tablet by mouth as needed   Refills: 6 Pupils: Pupils are equal, round, and reactive to light. Neck:      Musculoskeletal: Normal range of motion and neck supple. Thyroid: No thyromegaly. Vascular: No JVD. Trachea: No tracheal deviation. Cardiovascular:      Rate and Rhythm: Normal rate and regular rhythm. Heart sounds: Normal heart sounds, S1 normal and S2 normal. No murmur. No friction rub. No gallop. Pulmonary:      Effort: Pulmonary effort is normal.      Breath sounds: Normal breath sounds. No stridor. No wheezing, rhonchi or rales. Chest:      Chest wall: No tenderness. Abdominal:      General: Bowel sounds are normal. There is no distension. Palpations: Abdomen is soft. There is no mass. Tenderness: There is no abdominal tenderness. There is no guarding or rebound. Hernia: No hernia is present. Musculoskeletal: Normal range of motion. General: No tenderness. Lymphadenopathy:      Cervical: No cervical adenopathy. Skin:     General: Skin is warm and dry. Findings: No bruising, ecchymosis, lesion or rash. Neurological:      Mental Status: She is alert and oriented to person, place, and time. GCS: GCS eye subscore is 4. GCS verbal subscore is 5. GCS motor subscore is 6. Cranial Nerves: Cranial nerves are intact. No cranial nerve deficit. Sensory: Sensation is intact. Motor: Motor function is intact. Coordination: Coordination is intact. Coordination normal.      Gait: Gait is intact. Deep Tendon Reflexes:      Reflex Scores:       Tricep reflexes are 3+ on the right side and 3+ on the left side. Bicep reflexes are 3+ on the right side and 3+ on the left side. Brachioradialis reflexes are 2+ on the right side and 2+ on the left side. Patellar reflexes are 2+ on the right side and 2+ on the left side. Achilles reflexes are 2+ on the right side and 2+ on the left side. Comments: She Chvostek's sign Trousseau signs were negative. Psychiatric:         Speech: Speech normal.         Behavior: Behavior normal.         Thought Content: Thought content normal.         Judgment: Judgment normal.           DIFFERENTIAL DIAGNOSIS:   Hypercalcemia, electrolyte disorder, hypoparathyroidism, bilateral lower extremity edema. DIAGNOSTIC RESULTS     EKG: All EKG's are interpreted by the Emergency Department Physician who either signs or Co-signs this chart in the absence of a cardiologist.  EKG showed normal sinus rhythm, right axis deviation, ventricular rate of 72 MT interval 134 QRS duration of 58 QT interval 468 QT C of 512. When compared with EKG dated 10/30/2020 no significant change is appreciated.   Patient has had prolonged QTC in the past.    RADIOLOGY: non-plain film images(s) such as CT, Ultrasound and MRI are read by the radiologist.  No orders to display         LABS:   Labs Reviewed   CBC WITH AUTO DIFFERENTIAL - Abnormal; Notable for the following components:       Result Value    RBC 4.11 (*)     RDW-CV 15.1 (*)     RDW-SD 51.5 (*)     All other components within normal limits   COMPREHENSIVE METABOLIC PANEL - Abnormal; Notable for the following components:    Calcium 5.6 (*)     All other components within normal limits   CALCIUM, IONIZED - Abnormal; Notable for the following components:    Calcium, Ion 0.56 (*)     All other components within normal limits   URINE RT REFLEX TO CULTURE - Abnormal; Notable for the following components:    Ketones, Urine TRACE (*)     All other components within normal limits   GLOMERULAR FILTRATION RATE, ESTIMATED - Abnormal; Notable for the following components:    Est, Glom Filt Rate 50 (*)     All other components within normal limits   LIPASE   MAGNESIUM   TROPONIN   SODIUM, URINE, RANDOM   CALCIUM, RANDOM URINE   ANION GAP   OSMOLALITY       EMERGENCY DEPARTMENT COURSE:   Vitals:    Vitals: 02/15/21 0104 02/15/21 0228 02/15/21 0332 02/15/21 0416   BP: 95/69 106/89 105/82 102/74   Pulse: 73 82 80 69   Resp: 18 16 18 18   Temp:       TempSrc:       SpO2: 99% 98% 97% 97%   Weight:       Height:         Patient was assessed at bedside appropriate labs and imaging were ordered. Patient had low voltage and slightly prolonged QTC. Here the patient's calcium is 5.6.,  Her ionized calcium is 0.56. These are close to where her normals usually are. She is negative for gymnastics or Trousseau sign. She is sitting on the cot eating and drinking not complaining of any pain or problems. She stated she felt like she knew that her calcium was low. She states that she has been taking a calcium supplement she is not taking her diuretics on a daily basis as they are prescribed. She is still taking her Synthroid. She is no longer following up with her endocrinologist as he is retired. She is seeking a new one. Patient remains hemodynamically stable. Awake alert and oriented. Patient will be given 2 g of calcium here and I will reevaluate her. Patient remained relatively asymptomatic throughout course. Patient was up and walking around. She got her 2 g of calcium infusion. She feels much better. At this point I feel the patient be safely discharged home she is asymptomatic and she is usually very low. Patient is given the name of an endocrinologist in [facility]  with whom she can follow-up with. She is given refills on her calcitriol and vitamin D. She is instructed to take this as prescribed. She is instructed to return to the nearest emergency room immediately for any new or worsening complaints. Patient and  at bedside understood and agreed with the plan. Patient is subsequently discharged home in good condition. Patient has what appears to be hypocalcemia this is most likely due to secondary hypoparathyroidism after procedure. Patient is instructed to take the calcitriol and vitamin D as directed. Patient has been given the name of an endocrinologist with whom she can follow-up with. Patient is instructed return to the nearest emergency room immediately for any new or worsening complaints. CRITICAL CARE:   None    CONSULTS:  None    PROCEDURES:  None    FINAL IMPRESSION      1. Hypocalcemia    2. Hypoparathyroidism after procedure ([address]    3.  Bilateral lower extremity edema          DISPOSITION/PLAN   Discharge    PATIENT REFERRED TO:  Dr Rosas Sample  Call in 1 day      [facility], [address]  [phone]    Call in 2 days        DISCHARGE MEDICATIONS:  Current Discharge Medication List          (Please note that portions of this note were completed with a voice recognition program.  Efforts were made to edit the dictations but occasionally words are mis-transcribed.)    DO Blanca Vital DO  02/15/21 9537

## 2021-02-15 NOTE — ED NOTES
Pt resting quietly in room no needs expressed. Side rails up x2 with call light in reach. Will continue to monitor.        Earline Flatter, [place]  02/15/21 1769

## 2021-02-15 NOTE — ED NOTES
ED nurse-to-nurse bedside report    Chief Complaint   Patient presents with    Tingling     generalized    Leg Swelling      LOC: alert and orientated to name, place, date  Vital signs   Vitals:    02/15/21 0017 02/15/21 0047 02/15/21 0104 02/15/21 0228   BP: (!) 91/56 93/61 95/69 106/89   Pulse:  53 73 82   Resp:  17 18 16   Temp:       TempSrc:       SpO2:  99% 99% 98%   Weight:       Height:          Pain:    Pain Interventions: none  Pain Goal: N/A  Oxygen: No    Current needs required RA   Telemetry: Yes  LDAs:   Peripheral IV 02/15/21 Right Antecubital (Active)   Site Assessment Clean;Dry; Intact 02/15/21 0228   Line Status Infusing 02/15/21 0228   Dressing Status Clean; Intact;Dry 02/15/21 0228   Dressing Intervention New 02/15/21 0016     Continuous Infusions:   Mobility: Independent  Boles Fall Risk Score: No flowsheet data found.   Fall Interventions: call light in reach, bed low, wheels locked  Report given to: Seda Lovelace [place]  02/15/21 9606

## 2021-02-15 NOTE — ED NOTES
Urine sample collected and sent to lab.        Saritha [facility]  02/15/21 7093

## 2021-02-15 NOTE — ED NOTES
Assumed pt care at this time. Report received from [facility]. Pt asleep in bedside chair. Resp regular. Call light in reach.       Josey Walsh RN  02/15/21 1123

## 2021-02-16 PROCEDURE — 93010 ELECTROCARDIOGRAM REPORT: CPT | Performed by: INTERNAL MEDICINE

## 2021-03-09 ENCOUNTER — HOSPITAL ENCOUNTER (OUTPATIENT)
Age: 41
Setting detail: SPECIMEN
Discharge: HOME | End: 2021-03-09
Payer: COMMERCIAL

## 2021-03-09 LAB
ABSOLUTE EOS #: 0.18 K/UL (ref 0–0.44)
ABSOLUTE IMMATURE GRANULOCYTE: <0.03 K/UL (ref 0–0.3)
ABSOLUTE LYMPH #: 1.53 K/UL (ref 1.1–3.7)
ABSOLUTE MONO #: 0.41 K/UL (ref 0.1–1.2)
ALBUMIN SERPL-MCNC: 4.2 G/DL (ref 3.5–5.2)
ALBUMIN/GLOBULIN RATIO: 1.5 (ref 1–2.5)
ALP BLD-CCNC: 99 U/L (ref 35–104)
ALT SERPL-CCNC: 27 U/L (ref 5–33)
ANION GAP SERPL CALCULATED.3IONS-SCNC: 20 MMOL/L (ref 9–17)
AST SERPL-CCNC: 35 U/L
BASOPHILS # BLD: 1 % (ref 0–2)
BASOPHILS ABSOLUTE: 0.07 K/UL (ref 0–0.2)
BILIRUB SERPL-MCNC: 0.37 MG/DL (ref 0.3–1.2)
BUN BLDV-MCNC: 17 MG/DL (ref 6–20)
BUN/CREAT BLD: ABNORMAL (ref 9–20)
CALCIUM SERPL-MCNC: 6.7 MG/DL (ref 8.6–10.4)
CHLORIDE BLD-SCNC: 104 MMOL/L (ref 98–107)
CHOLESTEROL/HDL RATIO: 4.3
CHOLESTEROL: 176 MG/DL
CO2: 22 MMOL/L (ref 20–31)
CREAT SERPL-MCNC: 1.21 MG/DL (ref 0.5–0.9)
DIFFERENTIAL TYPE: ABNORMAL
EOSINOPHILS RELATIVE PERCENT: 3 % (ref 1–4)
FOLATE: 8.6 NG/ML
GFR AFRICAN AMERICAN: 60 ML/MIN
GFR NON-AFRICAN AMERICAN: 49 ML/MIN
GFR SERPL CREATININE-BSD FRML MDRD: ABNORMAL ML/MIN/{1.73_M2}
GFR SERPL CREATININE-BSD FRML MDRD: ABNORMAL ML/MIN/{1.73_M2}
GLUCOSE BLD-MCNC: 90 MG/DL (ref 70–99)
HCT VFR BLD CALC: 39.4 % (ref 36.3–47.1)
HDLC SERPL-MCNC: 41 MG/DL
HEMOGLOBIN: 12.4 G/DL (ref 11.9–15.1)
IMMATURE GRANULOCYTES: 0 %
LDL CHOLESTEROL: 90 MG/DL (ref 0–130)
LYMPHOCYTES # BLD: 24 % (ref 24–43)
MAGNESIUM: 1.8 MG/DL (ref 1.6–2.6)
MCH RBC QN AUTO: 29.4 PG (ref 25.2–33.5)
MCHC RBC AUTO-ENTMCNC: 31.5 G/DL (ref 28.4–34.8)
MCV RBC AUTO: 93.4 FL (ref 82.6–102.9)
MONOCYTES # BLD: 6 % (ref 3–12)
NRBC AUTOMATED: 0 PER 100 WBC
PDW BLD-RTO: 15.3 % (ref 11.8–14.4)
PLATELET # BLD: 188 K/UL (ref 138–453)
PLATELET ESTIMATE: ABNORMAL
PMV BLD AUTO: 12.7 FL (ref 8.1–13.5)
POTASSIUM SERPL-SCNC: 4.2 MMOL/L (ref 3.7–5.3)
RBC # BLD: 4.22 M/UL (ref 3.95–5.11)
RBC # BLD: ABNORMAL 10*6/UL
SEG NEUTROPHILS: 66 % (ref 36–65)
SEGMENTED NEUTROPHILS ABSOLUTE COUNT: 4.29 K/UL (ref 1.5–8.1)
SODIUM BLD-SCNC: 146 MMOL/L (ref 135–144)
THYROXINE, FREE: <0.1 NG/DL (ref 0.93–1.7)
TOTAL PROTEIN: 7 G/DL (ref 6.4–8.3)
TRIGL SERPL-MCNC: 226 MG/DL
TSH SERPL DL<=0.05 MIU/L-ACNC: 83.8 MIU/L (ref 0.3–5)
VITAMIN B-12: 434 PG/ML (ref 232–1245)
VLDLC SERPL CALC-MCNC: ABNORMAL MG/DL (ref 1–30)
WBC # BLD: 6.5 K/UL (ref 3.5–11.3)
WBC # BLD: ABNORMAL 10*3/UL

## 2021-03-11 LAB — VITAMIN D 1,25-DIHYDROXY: 40.9 PG/ML (ref 19.9–79.3)

## 2021-03-29 ENCOUNTER — HOSPITAL ENCOUNTER (EMERGENCY)
Age: 41
Discharge: HOME | End: 2021-03-30
Attending: EMERGENCY MEDICINE
Payer: COMMERCIAL

## 2021-03-29 VITALS
WEIGHT: 264.55 LBS | BODY MASS INDEX: 46.86 KG/M2 | TEMPERATURE: 97.8 F | RESPIRATION RATE: 14 BRPM | OXYGEN SATURATION: 93 % | HEART RATE: 60 BPM | DIASTOLIC BLOOD PRESSURE: 78 MMHG | SYSTOLIC BLOOD PRESSURE: 95 MMHG

## 2021-03-29 DIAGNOSIS — E83.51 HYPOCALCEMIA: Primary | ICD-10-CM

## 2021-03-29 DIAGNOSIS — E20.9 HYPOPARATHYROIDISM, UNSPECIFIED HYPOPARATHYROIDISM TYPE (HCC): ICD-10-CM

## 2021-03-29 LAB
ALBUMIN SERPL-MCNC: 4.6 G/DL (ref 3.5–5.1)
ALP BLD-CCNC: 100 U/L (ref 38–126)
ALT SERPL-CCNC: 32 U/L (ref 11–66)
ANION GAP SERPL CALCULATED.3IONS-SCNC: 12 MEQ/L (ref 8–16)
AST SERPL-CCNC: 40 U/L (ref 5–40)
BASOPHILS # BLD: 1.3 %
BASOPHILS ABSOLUTE: 0.1 THOU/MM3 (ref 0–0.1)
BILIRUB SERPL-MCNC: 0.4 MG/DL (ref 0.3–1.2)
BUN BLDV-MCNC: 24 MG/DL (ref 7–22)
CALCIUM IONIZED: 0.73 MMOL/L (ref 1.12–1.32)
CALCIUM SERPL-MCNC: 6.7 MG/DL (ref 8.5–10.5)
CHLORIDE BLD-SCNC: 101 MEQ/L (ref 98–111)
CO2: 30 MEQ/L (ref 23–33)
CREAT SERPL-MCNC: 1.4 MG/DL (ref 0.4–1.2)
EKG ATRIAL RATE: 60 BPM
EKG P AXIS: 41 DEGREES
EKG P-R INTERVAL: 154 MS
EKG Q-T INTERVAL: 472 MS
EKG QRS DURATION: 56 MS
EKG QTC CALCULATION (BAZETT): 472 MS
EKG R AXIS: 79 DEGREES
EKG T AXIS: 30 DEGREES
EKG VENTRICULAR RATE: 60 BPM
EOSINOPHIL # BLD: 2.5 %
EOSINOPHILS ABSOLUTE: 0.2 THOU/MM3 (ref 0–0.4)
ERYTHROCYTE [DISTWIDTH] IN BLOOD BY AUTOMATED COUNT: 14.9 % (ref 11.5–14.5)
ERYTHROCYTE [DISTWIDTH] IN BLOOD BY AUTOMATED COUNT: 51.8 FL (ref 35–45)
GFR SERPL CREATININE-BSD FRML MDRD: 42 ML/MIN/1.73M2
GLUCOSE BLD-MCNC: 83 MG/DL (ref 70–108)
HCT VFR BLD CALC: 42.1 % (ref 37–47)
HEMOGLOBIN: 13.3 GM/DL (ref 12–16)
IMMATURE GRANS (ABS): 0.02 THOU/MM3 (ref 0–0.07)
IMMATURE GRANULOCYTES: 0.3 %
LYMPHOCYTES # BLD: 32.6 %
LYMPHOCYTES ABSOLUTE: 2.3 THOU/MM3 (ref 1–4.8)
MCH RBC QN AUTO: 29.8 PG (ref 26–33)
MCHC RBC AUTO-ENTMCNC: 31.6 GM/DL (ref 32.2–35.5)
MCV RBC AUTO: 94.2 FL (ref 81–99)
MONOCYTES # BLD: 5.1 %
MONOCYTES ABSOLUTE: 0.4 THOU/MM3 (ref 0.4–1.3)
NUCLEATED RED BLOOD CELLS: 0 /100 WBC
OSMOLALITY CALCULATION: 288.2 MOSMOL/KG (ref 275–300)
PLATELET # BLD: 170 THOU/MM3 (ref 130–400)
PMV BLD AUTO: 12.6 FL (ref 9.4–12.4)
POTASSIUM REFLEX MAGNESIUM: 4.3 MEQ/L (ref 3.5–5.2)
RBC # BLD: 4.47 MILL/MM3 (ref 4.2–5.4)
SEG NEUTROPHILS: 58.2 %
SEGMENTED NEUTROPHILS ABSOLUTE COUNT: 4.1 THOU/MM3 (ref 1.8–7.7)
SODIUM BLD-SCNC: 143 MEQ/L (ref 135–145)
TOTAL PROTEIN: 7.5 G/DL (ref 6.1–8)
WBC # BLD: 7.1 THOU/MM3 (ref 4.8–10.8)

## 2021-03-29 PROCEDURE — 85025 COMPLETE CBC W/AUTO DIFF WBC: CPT

## 2021-03-29 PROCEDURE — 36415 COLL VENOUS BLD VENIPUNCTURE: CPT

## 2021-03-29 PROCEDURE — 2580000003 HC RX 258: Performed by: STUDENT IN AN ORGANIZED HEALTH CARE EDUCATION/TRAINING PROGRAM

## 2021-03-29 PROCEDURE — 93005 ELECTROCARDIOGRAM TRACING: CPT | Performed by: STUDENT IN AN ORGANIZED HEALTH CARE EDUCATION/TRAINING PROGRAM

## 2021-03-29 PROCEDURE — 82330 ASSAY OF CALCIUM: CPT

## 2021-03-29 PROCEDURE — 99283 EMERGENCY DEPT VISIT LOW MDM: CPT

## 2021-03-29 PROCEDURE — 80053 COMPREHEN METABOLIC PANEL: CPT

## 2021-03-29 PROCEDURE — 96366 THER/PROPH/DIAG IV INF ADDON: CPT

## 2021-03-29 PROCEDURE — 6360000002 HC RX W HCPCS: Performed by: STUDENT IN AN ORGANIZED HEALTH CARE EDUCATION/TRAINING PROGRAM

## 2021-03-29 PROCEDURE — 96365 THER/PROPH/DIAG IV INF INIT: CPT

## 2021-03-29 RX ADMIN — CALCIUM GLUCONATE 2000 MG: 98 INJECTION, SOLUTION INTRAVENOUS at 21:41

## 2021-03-29 ASSESSMENT — ENCOUNTER SYMPTOMS
EYE PAIN: 0
NAUSEA: 0
EYE DISCHARGE: 0
CHEST TIGHTNESS: 1
SHORTNESS OF BREATH: 0
CONSTIPATION: 0
VOMITING: 0
ABDOMINAL PAIN: 0
DIARRHEA: 0

## 2021-03-29 NOTE — ED TRIAGE NOTES
Pt presents to the ED for low calcium. Pt states that she has to get calcium replacement frequently.

## 2021-03-30 PROCEDURE — 93010 ELECTROCARDIOGRAM REPORT: CPT | Performed by: INTERNAL MEDICINE

## 2021-03-30 NOTE — ED NOTES
Pt moved to room 022 at this time. Vitals reassessed, respirs normal. Provider at bedside at this time. Call light in reach.       Ella Mccarty  03/29/21 2009

## 2021-03-30 NOTE — ED NOTES
EKG completed. Vitals reassessed, respirs normal. Pt on the phone with family. Denies further needs. Call light in reach.       [employer]  03/29/21 1304

## 2021-03-30 NOTE — ED NOTES
Pt resting in bed at this time. Vitals reassessed, respirs normal. Warm blanket provided. Denies further needs, call light in reach.       [employer]  03/29/21 0965

## 2021-03-30 NOTE — ED PROVIDER NOTES
[address] [facility] [address] [address] EMERGENCY DEPT  Emergency Department  Attending Physician Attestation       Patient was seen by  ER/IM Resident Dr. Marissa Noyola and I supervised/co-managed the case    I performed a history and physical examination of the patient and discussed management with the Resident/Trainee. I reviewed the Resident's note and agree with the documented findings and plan of care. Any areas of disagreement are noted on the chart. I was personally present for the key portions of any procedures. I have documented in the chart those procedures where I was not present during the key portions. I have reviewed the emergency nurses triage note. I agree with the chief complaint, past medical history, past surgical history, allergies, medications, social and family history as documented unless otherwise noted below. For Physician Assistant/ Nurse Practitioner cases I have personally evaluated this patient and have completed at least one if not all key elements of the E/M (history, physical exam, and MDM). Additional findings are as noted. Chief Complaint      Declan Mendez is a 36 y.o. female who presented to the emergency room due to low calcium. Patient had a history of hypocalcemia following a thyroidectomy in September 2019. Evidently the patient also has parathyroid also was taken out and since then the patient had bouts of hypocalcemia. Patient been seen here several times, 2 days ago the patient started having tingling sensations in the fingers  her fingers locks up and also twitches in the face    System Problem List     Patient Active Problem List   Diagnosis    Hypocalcemia    S/P total thyroidectomy    Hypoparathyroidism after procedure ([facility] [address]    History of Adam-en-Y gastric bypass       Pertinent Physical Exam:     weight is 264 lb 8.8 oz (120 kg). Her oral temperature is 97.8 °F (36.6 °C). Her blood pressure is 114/72 and her pulse is 60. Her respiration is 16 and oxygen saturation is 94%.

## 2021-03-30 NOTE — ED PROVIDER NOTES
[place] ENCOUNTER          Pt Name: Brandon Rdz  MRN: [id number]  [place] 1980  Date of evaluation: 3/29/2021  Treating Resident Physician: Carlo Hubbard DPM  Supervising Physician: Yolette Peres MD    CHIEF COMPLAINT     No chief complaint on file. History obtained from the patient. HISTORY OF PRESENT ILLNESS    HPI  Brandon Rdz is a 36 y.o. female who presents to the emergency department for evaluation of hypocalcemia. Patient claims a known history of episodic hypocalcemia following an iatrogenic lesion to her parathyroid gland years ago. Patient relates that she currently has muscle cramping and tingling present in her bilateral arms. Patient claims tetany in her right hand, but when distracted, can move her hand through normal range of motion. Patient relates that this has been gradually worsening. Patient states that she has been taking her oral calcium and vitamin D supplementation, but that she has had issues with reabsorption secondary to a ernesto-en-y gastric bypass. The patient has no other acute complaints at this time. REVIEW OF SYSTEMS   Review of Systems   Constitutional: Negative for chills and fever. HENT: Negative for congestion and sneezing. Eyes: Negative for pain and discharge. Respiratory: Positive for chest tightness. Negative for shortness of breath. Cardiovascular: Positive for palpitations. Negative for chest pain. Gastrointestinal: Negative for abdominal pain, constipation, diarrhea, nausea and vomiting. Genitourinary: Negative for dysuria and urgency. Musculoskeletal: Positive for myalgias (Bilateral arms and hands). Skin: Negative for rash and wound. Neurological: Negative for seizures, syncope and facial asymmetry. Psychiatric/Behavioral: Negative for agitation and confusion. The patient is not hyperactive.           PAST MEDICAL AND SURGICAL HISTORY     Past Medical History:   Diagnosis Date    Adjustment disorder     Anxiety     GERD (gastroesophageal reflux disease)     Hypothyroidism     Obesity     Seasonal allergies     Thyroid nodule      Past Surgical History:   Procedure Laterality Date    BARIATRIC SURGERY  11/15/2018    Dr Tarango Check  [id number]    x3    [place]LE[place]    [place]    OTHER SURGICAL HISTORY  11/15/2018    [facility] liver biopsy,extensive complex lysis of adhesions from pelvis and small bowel from omentum -Dr Liya Sanchez N/A 9/19/2019    TOTAL THYROIDECTOMY performed by Estuardo Palomares MD at [facility]      age 4-[address] [facility]  2009    [place]-Dr Dovie Shone UPPER GASTROINTESTINAL ENDOSCOPY  11/15/2018    Dr Sara Fontenot     Current Facility-Administered Medications:     calcium gluconate 2,000 mg in dextrose 5 % 100 mL IVPB, 2,000 mg, Intravenous, Once, Hailey Parks DPM    Current Outpatient Medications:     calcitRIOL (ROCALTROL) 0.5 MCG capsule, Take 2 capsules by mouth 4 times daily, Disp: 240 capsule, Rfl: 0    ergocalciferol (ERGOCALCIFEROL) 1.25 MG (90404 UT) capsule, Take 1 capsule by mouth once a week, Disp: 4 capsule, Rfl: 0    busPIRone (BUSPAR) 7.5 MG tablet, Take 7.5 mg by mouth as needed, Disp: , Rfl:     calcium citrate (CALCITRATE) 250 MG TABS tablet, Take 4 tablets by mouth 4 times daily, Disp: 480 tablet, Rfl: 0    magnesium oxide (MAG-OX) 400 (241.3 Mg) MG TABS tablet, Take 1 tablet by mouth daily for 7 days, Disp: 7 tablet, Rfl: 0    Loratadine-Pseudoephedrine (CLARITIN-D 24 HOUR PO), Take 1 tablet by mouth daily, Disp: , Rfl:     levothyroxine (SYNTHROID) 200 MCG tablet, Take 200 mcg by mouth Daily, Disp: , Rfl:     ondansetron (ZOFRAN ODT) 4 MG disintegrating tablet, Take 1 tablet by mouth every 8 hours as needed for Nausea, Disp: 20 tablet, Rfl: 0    vitamin B-12 (CYANOCOBALAMIN) 500 MCG tablet, Take 1 tablet by mouth daily, Disp: , Rfl: 6    spironolactone (ALDACTONE) 50 MG tablet, Take 1 tablet by mouth as needed , Disp: , Rfl: 6    furosemide (LASIX) 40 MG tablet, Take 1 tablet by mouth as needed , Disp: , Rfl: 6    estradiol (ESTRACE) 2 MG tablet, Take 1 tablet by mouth daily, Disp: , Rfl: 6    Multiple Vitamin (MULTI VITAMIN DAILY PO), Take by mouth Indications: flinstones, Disp: , Rfl:       SOCIAL HISTORY     Social History     Social History Narrative    Not on file     Social History     Tobacco Use    Smoking status: Current Every Day Smoker     Packs/day: 1.00    Smokeless tobacco: Never Used   Substance Use Topics    Alcohol use: Yes     Comment: social holidays     Drug use: Never         ALLERGIES     Allergies   Allergen Reactions    Latex Itching and Rash         FAMILY HISTORY     Family History   Problem Relation Age of Onset    Other Mother         gallbladder disease    Diabetes Father     Hypertension Father     Arthritis Father     Diabetes Paternal Grandfather     Cancer Paternal Grandmother     Obesity Maternal Grandmother     Diabetes Maternal Grandfather          PREVIOUS RECORDS   Previous records reviewed: Patient has history of prior [facility] to [facility] ED for hypocalcemia. PHYSICAL EXAM     ED Triage Vitals   BP Temp Temp Source Pulse Resp SpO2 Height Weight   03/29/21 1758 03/29/21 1758 03/29/21 1758 03/29/21 1758 03/29/21 1758 03/29/21 1758 -- 03/29/21 2045   129/76 97.8 °F (36.6 °C) Oral 91 18 95 %  264 lb 8.8 oz (120 kg)     Initial vital signs and nursing assessment reviewed and normal. Body mass index is 46.86 kg/m². Pulsoximetry is normal per my interpretation. Additional Vital Signs:  Vitals:    03/29/21 2007   BP: (!) 148/107   Pulse: 72   Resp: 14   Temp:    SpO2: 99%       Physical Exam  Constitutional:       Appearance: Normal appearance. She is obese. HENT:      Head: Normocephalic and atraumatic.    Eyes: Extraocular Movements: Extraocular movements intact. Conjunctiva/sclera: Conjunctivae normal.      Pupils: Pupils are equal, round, and reactive to light. Cardiovascular:      Rate and Rhythm: Normal rate and regular rhythm. Pulses: Normal pulses. Heart sounds: Normal heart sounds. Pulmonary:      Effort: Pulmonary effort is normal.      Breath sounds: Normal breath sounds. No rhonchi or rales. Abdominal:      General: Abdomen is flat. Bowel sounds are normal.      Palpations: Abdomen is soft. Musculoskeletal:      Right lower leg: Edema present. Left lower leg: Edema present. Comments: Pain with ROM of bilateral upper extremities, particularly active ROM of hands. Skin:     General: Skin is warm and dry. Capillary Refill: Capillary refill takes less than 2 seconds. Neurological:      General: No focal deficit present. Mental Status: She is alert and oriented to person, place, and time. Mental status is at baseline. Psychiatric:         Mood and Affect: Mood normal.         Behavior: Behavior normal.         Thought Content: Thought content normal.         Judgment: Judgment normal.             MEDICAL DECISION MAKING   Initial Assessment:   1. Hypocalcemia. Plan:    Patient seen and evaluated. Ordered CBC, CMP, and ionized calcium. Of note, ionized calcium was 0.73. Patient was in the process of being treated in the ED with 2g of IV calcium gluconate. At time of writing of this note, the patient's IV had blocked, and nursing was working to re-establish the line. EKG was pending at time of note being written. Case was discussed with Dr. Otis Muro and was handed off to him. Please see his note for further care of this patient.      ED RESULTS   Laboratory results:  Labs Reviewed   CBC WITH AUTO DIFFERENTIAL - Abnormal; Notable for the following components:       Result Value    MCHC 31.6 (*)     RDW-CV 14.9 (*)     RDW-SD 51.8 (*)     MPV 12.6 (*)     All other components within normal limits   COMPREHENSIVE METABOLIC PANEL W/ REFLEX TO MG FOR LOW K - Abnormal; Notable for the following components:    CREATININE 1.4 (*)     BUN 24 (*)     Calcium 6.7 (*)     All other components within normal limits   GLOMERULAR FILTRATION RATE, ESTIMATED - Abnormal; Notable for the following components:    Est, Glom Filt Rate 42 (*)     All other components within normal limits   CALCIUM, IONIZED - Abnormal; Notable for the following components:    Calcium, Ion 0.73 (*)     All other components within normal limits   ANION GAP   OSMOLALITY       Radiologic studies results:  No orders to display       ED Medications administered this visit:   Medications   calcium gluconate 2,000 mg in dextrose 5 % 100 mL IVPB (has no administration in time range)         ED COURSE            MEDICATION CHANGES     New Prescriptions    No medications on file         FINAL DISPOSITION     Final diagnoses:   Hypocalcemia     Condition: pending  Dispo: pending      This transcription was electronically signed. Parts of this transcriptions may have been dictated by use of voice recognition software and electronically transcribed, and parts may have been transcribed with the assistance of an ED scribe. The transcription may contain errors not detected in proofreading. Please refer to my supervising physician's documentation if my documentation differs.     Electronically Signed: Debo Bryant, 03/29/21, 9:03 PM          Debo Bryant DPM  Resident  03/29/21 [id number]

## 2021-04-06 ENCOUNTER — APPOINTMENT (OUTPATIENT)
Dept: GENERAL RADIOLOGY | Age: 41
End: 2021-04-06
Payer: COMMERCIAL

## 2021-04-06 ENCOUNTER — HOSPITAL ENCOUNTER (EMERGENCY)
Age: 41
Discharge: HOME | End: 2021-04-06
Payer: COMMERCIAL

## 2021-04-06 VITALS
DIASTOLIC BLOOD PRESSURE: 83 MMHG | TEMPERATURE: 97.5 F | RESPIRATION RATE: 18 BRPM | OXYGEN SATURATION: 98 % | HEART RATE: 79 BPM | SYSTOLIC BLOOD PRESSURE: 95 MMHG

## 2021-04-06 DIAGNOSIS — M79.671 RIGHT FOOT PAIN: Primary | ICD-10-CM

## 2021-04-06 PROCEDURE — 99283 EMERGENCY DEPT VISIT LOW MDM: CPT

## 2021-04-06 PROCEDURE — 73630 X-RAY EXAM OF FOOT: CPT

## 2021-04-06 PROCEDURE — 6370000000 HC RX 637 (ALT 250 FOR IP): Performed by: PHYSICIAN ASSISTANT

## 2021-04-06 RX ORDER — TRAMADOL HYDROCHLORIDE 50 MG/1
50 TABLET ORAL ONCE
Status: COMPLETED | OUTPATIENT
Start: 2021-04-06 | End: 2021-04-06

## 2021-04-06 RX ORDER — TRAMADOL HYDROCHLORIDE 50 MG/1
50 TABLET ORAL EVERY 6 HOURS PRN
Qty: 15 TABLET | Refills: 0 | Status: SHIPPED | OUTPATIENT
Start: 2021-04-06 | End: 2021-04-11

## 2021-04-06 RX ADMIN — TRAMADOL HYDROCHLORIDE 50 MG: 50 TABLET, FILM COATED ORAL at 22:27

## 2021-04-06 ASSESSMENT — ENCOUNTER SYMPTOMS
VOMITING: 0
ABDOMINAL PAIN: 0
NAUSEA: 1
COUGH: 0
SHORTNESS OF BREATH: 0

## 2021-04-06 ASSESSMENT — PAIN SCALES - GENERAL: PAINLEVEL_OUTOF10: 10

## 2021-04-06 ASSESSMENT — PAIN DESCRIPTION - LOCATION: LOCATION: FOOT

## 2021-04-07 NOTE — ED PROVIDER NOTES
[facility] EMERGENCY DEPT      CHIEF COMPLAINT       Chief Complaint   Patient presents with    Foot Pain       Nurses Notes reviewed and I agree except as noted in the HPI. HISTORY OF PRESENT ILLNESS    Corby Espinoza is a 36 y.o. female who presents for right foot pain. Her pain is located in the arch of her right foot, confined to the plantar side and has been going on for 4-7 days. She states she awoke with the pain but the pain did not wake her from sleep, thought she did have a dream that she stepped on a rock. She endorses a history of restless leg syndrome and thinks she may have kicked the coffee table overnight. She states that she has a history of pitting edema and water retention, but her right foot is currently more swollen than her baseline. She describes the pain as throbbing and sharp, at a 7-8/10 at rest and 10/10 when she tries to walk. She has tried OTC pain relievers (Advil, tylenol) and home remedies (ice, elevation, heat) with no success. The pain shoots up her leg when she walks and the pain is so intense she is often nauseous. She had a gastric bypass surgery in 11/2018, so she does not vomit from the pain. The night before her symptoms started, she did have a calcium infusion for her chronic hypocalcemia s/p thyroidectomy (2019), but she has had infusions in the past without issue. She denies fever, chest pain, abdominal pain, shortness of breath, and history of gout. She does have frequent chills which she attributes to her surgeries and is normal at her baseline. She also states that she has gained around 40lbs since COVID began. REVIEW OF SYSTEMS     Review of Systems   Constitutional: Positive for activity change, appetite change and chills. Negative for fever. Respiratory: Negative for cough and shortness of breath. Cardiovascular: Negative for chest pain. Gastrointestinal: Positive for nausea. Negative for abdominal pain and vomiting.    Endocrine: Positive for (LASIX) 40 MG tablet Take 1 tablet by mouth as needed , R-6Historical Med      estradiol (ESTRACE) 2 MG tablet Take 1 tablet by mouth daily, R-6Historical Med      Multiple Vitamin (MULTI VITAMIN DAILY PO) Take by mouth Indications: flinstonesHistorical Med             ALLERGIES     is allergic to latex. FAMILY HISTORY     She indicated that her mother is alive. She indicated that her father is alive. She indicated that her sister is alive. She indicated that her brother is alive. She indicated that her maternal grandmother is . She indicated that her maternal grandfather is . She indicated that her paternal grandmother is alive. She indicated that her paternal grandfather is . family history includes Arthritis in her father; Cancer in her paternal grandmother; Diabetes in her father, maternal grandfather, and paternal grandfather; Hypertension in her father; Obesity in her maternal grandmother; Other in her mother. SOCIAL HISTORY    reports that she has been smoking. She has been smoking about 1.00 pack per day. She has never used smokeless tobacco. She reports current alcohol use. She reports that she does not use drugs. PHYSICAL EXAM     INITIAL VITALS:  oral temperature is 97.5 °F (36.4 °C). Her blood pressure is 95/83 and her pulse is 79. Her respiration is 18 and oxygen saturation is 98%. Physical Exam  Constitutional:       Appearance: Normal appearance. HENT:      Head: Normocephalic and atraumatic. Cardiovascular:      Rate and Rhythm: Normal rate and regular rhythm. Pulses: Normal pulses. Heart sounds: Normal heart sounds. Pulmonary:      Effort: Pulmonary effort is normal.      Breath sounds: Normal breath sounds. Abdominal:      General: Abdomen is flat. Palpations: Abdomen is soft. Musculoskeletal: Normal range of motion. General: Swelling and tenderness present.       Right lower leg: Normal.      Left lower leg: Normal.      Right foot: Normal range of motion. Tenderness and swelling present. Left foot: Normal range of motion. Feet:       Comments: Swelling with   Feet:      Right foot:      Skin integrity: Skin integrity normal.      Toenail Condition: Right toenails are normal.      Left foot:      Skin integrity: Skin integrity normal.      Toenail Condition: Left toenails are normal.      Comments: Strength 5/5 bilaterally  Skin:     General: Skin is warm and dry. Neurological:      Mental Status: She is alert and oriented to person, place, and time. Psychiatric:         Mood and Affect: Mood normal.         Behavior: Behavior normal.         DIFFERENTIAL DIAGNOSIS:   Including but not limited to: metatarsal fracture, foot sprain, gout, pseudogout, reactive arthritis    DIAGNOSTIC RESULTS     EKG: All [facility]'s [facility] who either signs or Co-signs this chart in the absence of a cardiologist.  None    RADIOLOGY: non-plain film images(s) such as CT,Ultrasound and MRI are read by the radiologist.  Plain radiographic images are visualized and preliminarily interpreted by the emergency physician unless otherwise stated below. XR FOOT RIGHT (MIN 3 VIEWS)   Final Result   Impression:   1. Dorsal soft tissue swelling. No acute fracture seen. This document has been electronically signed by: Adri Hicks MD on    04/06/2021 10:05 PM          LABS:   Labs Reviewed - No data to display    EMERGENCY DEPARTMENT COURSE:   Vitals:    Vitals:    04/06/21 2036   BP: 95/83   Pulse: 79   Resp: 18   Temp: 97.5 °F (36.4 °C)   TempSrc: Oral   SpO2: 98%     2045: I examined and talked with the patient. MDM:  I saw this patient in the emergency department for right foot pain. Upon physical examination, the patient had moderate tenderness to light palpation around the arch of the foot without bruising or erythema, but with mild edema. She was neurovascularly intact.      Vital signs were monitored and noted stable. Pertinent findings: x-ray of right foot showed dorsal soft tissue swelling without acute fracture. Ace wrap and post op shoe applied. Discussed plan of discharge with patient and her , they seemed amenable. Recommended follow up with podiatry and primary care. CRITICAL CARE:   None    CONSULTS:  None    PROCEDURES:  None    FINAL IMPRESSION      1. Right foot pain          DISPOSITION/PLAN     1. Right foot pain        PATIENT REFERRED TO:  CADY Anderson  [address] [facility] [address] [phone]    Schedule an appointment as soon as possible for a visit         DISCHARGE MEDICATIONS:  Discharge Medication List as of 4/6/2021 10:45 PM      START taking these medications    Details   traMADol (ULTRAM) 50 MG tablet Take 1 tablet by mouth every 6 hours as needed for Pain for up to 5 days. , Disp-15 tablet, R-0Print             (Please note that portions of this note were completed with a voice recognition program.  Efforts were made to edit the dictations but occasionally words are mis-transcribed.)    Hilda Holstein, PA-C 04/08/21 1:20 AM    Hilda Holstein, PA-C Hilda Holstein, PA-C  04/08/21 [id number]

## 2021-04-07 NOTE — ED TRIAGE NOTES
Comes in to the er with c/o foot pain no known injury. Rt foot the arch and the top hurting at a 10 at this time.

## 2021-04-08 ASSESSMENT — ENCOUNTER SYMPTOMS: COLOR CHANGE: 0

## 2021-05-26 ENCOUNTER — HOSPITAL ENCOUNTER (OUTPATIENT)
Age: 41
Setting detail: SPECIMEN
Discharge: HOME | End: 2021-05-26
Payer: COMMERCIAL

## 2021-05-29 LAB
HPV SAMPLE: NORMAL
HPV, GENOTYPE 16: NOT DETECTED
HPV, GENOTYPE 18: NOT DETECTED
HPV, HIGH RISK OTHER: NOT DETECTED
HPV, INTERPRETATION: NORMAL
SPECIMEN DESCRIPTION: NORMAL

## 2021-06-01 LAB — CYTOLOGY REPORT: NORMAL

## 2021-06-13 PROCEDURE — 99284 EMERGENCY DEPT VISIT MOD MDM: CPT

## 2021-06-13 PROCEDURE — 96365 THER/PROPH/DIAG IV INF INIT: CPT

## 2021-06-13 PROCEDURE — 96366 THER/PROPH/DIAG IV INF ADDON: CPT

## 2021-06-14 ENCOUNTER — HOSPITAL ENCOUNTER (EMERGENCY)
Age: 41
Discharge: HOME | End: 2021-06-14
Attending: EMERGENCY MEDICINE
Payer: COMMERCIAL

## 2021-06-14 VITALS
RESPIRATION RATE: 16 BRPM | TEMPERATURE: 97.9 F | OXYGEN SATURATION: 94 % | SYSTOLIC BLOOD PRESSURE: 110 MMHG | DIASTOLIC BLOOD PRESSURE: 66 MMHG | HEART RATE: 76 BPM

## 2021-06-14 DIAGNOSIS — E83.51 HYPOCALCEMIA: ICD-10-CM

## 2021-06-14 DIAGNOSIS — E89.2 HYPOPARATHYROIDISM AFTER PROCEDURE (HCC): Primary | ICD-10-CM

## 2021-06-14 LAB
ALBUMIN SERPL-MCNC: 4.4 G/DL (ref 3.5–5.1)
ALP BLD-CCNC: 101 U/L (ref 38–126)
ALT SERPL-CCNC: 33 U/L (ref 11–66)
ANION GAP SERPL CALCULATED.3IONS-SCNC: 15 MEQ/L (ref 8–16)
AST SERPL-CCNC: 55 U/L (ref 5–40)
BASOPHILS # BLD: 1.2 %
BASOPHILS ABSOLUTE: 0.1 THOU/MM3 (ref 0–0.1)
BILIRUB SERPL-MCNC: 0.5 MG/DL (ref 0.3–1.2)
BUN BLDV-MCNC: 17 MG/DL (ref 7–22)
CALCIUM IONIZED: 0.64 MMOL/L (ref 1.12–1.32)
CALCIUM SERPL-MCNC: 5.8 MG/DL (ref 8.5–10.5)
CHLORIDE BLD-SCNC: 102 MEQ/L (ref 98–111)
CO2: 26 MEQ/L (ref 23–33)
CREAT SERPL-MCNC: 1.4 MG/DL (ref 0.4–1.2)
EKG ATRIAL RATE: 77 BPM
EKG P AXIS: 47 DEGREES
EKG P-R INTERVAL: 156 MS
EKG Q-T INTERVAL: 356 MS
EKG QRS DURATION: 54 MS
EKG QTC CALCULATION (BAZETT): 402 MS
EKG R AXIS: 110 DEGREES
EKG T AXIS: 44 DEGREES
EKG VENTRICULAR RATE: 77 BPM
EOSINOPHIL # BLD: 3 %
EOSINOPHILS ABSOLUTE: 0.2 THOU/MM3 (ref 0–0.4)
ERYTHROCYTE [DISTWIDTH] IN BLOOD BY AUTOMATED COUNT: 14.9 % (ref 11.5–14.5)
ERYTHROCYTE [DISTWIDTH] IN BLOOD BY AUTOMATED COUNT: 51.4 FL (ref 35–45)
GFR SERPL CREATININE-BSD FRML MDRD: 42 ML/MIN/1.73M2
GLUCOSE BLD-MCNC: 99 MG/DL (ref 70–108)
HCT VFR BLD CALC: 41 % (ref 37–47)
HEMOGLOBIN: 13 GM/DL (ref 12–16)
IMMATURE GRANS (ABS): 0.02 THOU/MM3 (ref 0–0.07)
IMMATURE GRANULOCYTES: 0.3 %
LYMPHOCYTES # BLD: 29.9 %
LYMPHOCYTES ABSOLUTE: 1.8 THOU/MM3 (ref 1–4.8)
MAGNESIUM: 1.8 MG/DL (ref 1.6–2.4)
MCH RBC QN AUTO: 29.7 PG (ref 26–33)
MCHC RBC AUTO-ENTMCNC: 31.7 GM/DL (ref 32.2–35.5)
MCV RBC AUTO: 93.6 FL (ref 81–99)
MONOCYTES # BLD: 6.1 %
MONOCYTES ABSOLUTE: 0.4 THOU/MM3 (ref 0.4–1.3)
NUCLEATED RED BLOOD CELLS: 0 /100 WBC
OSMOLALITY CALCULATION: 286.6 MOSMOL/KG (ref 275–300)
PLATELET # BLD: 142 THOU/MM3 (ref 130–400)
PMV BLD AUTO: 12.4 FL (ref 9.4–12.4)
POTASSIUM SERPL-SCNC: 3.9 MEQ/L (ref 3.5–5.2)
RBC # BLD: 4.38 MILL/MM3 (ref 4.2–5.4)
SEG NEUTROPHILS: 59.5 %
SEGMENTED NEUTROPHILS ABSOLUTE COUNT: 3.6 THOU/MM3 (ref 1.8–7.7)
SODIUM BLD-SCNC: 143 MEQ/L (ref 135–145)
TOTAL PROTEIN: 7.2 G/DL (ref 6.1–8)
WBC # BLD: 6.1 THOU/MM3 (ref 4.8–10.8)

## 2021-06-14 PROCEDURE — 85025 COMPLETE CBC W/AUTO DIFF WBC: CPT

## 2021-06-14 PROCEDURE — 96366 THER/PROPH/DIAG IV INF ADDON: CPT

## 2021-06-14 PROCEDURE — 96365 THER/PROPH/DIAG IV INF INIT: CPT

## 2021-06-14 PROCEDURE — 2580000003 HC RX 258: Performed by: EMERGENCY MEDICINE

## 2021-06-14 PROCEDURE — 93010 ELECTROCARDIOGRAM REPORT: CPT | Performed by: INTERNAL MEDICINE

## 2021-06-14 PROCEDURE — 80053 COMPREHEN METABOLIC PANEL: CPT

## 2021-06-14 PROCEDURE — 82330 ASSAY OF CALCIUM: CPT

## 2021-06-14 PROCEDURE — 83735 ASSAY OF MAGNESIUM: CPT

## 2021-06-14 PROCEDURE — 36415 COLL VENOUS BLD VENIPUNCTURE: CPT

## 2021-06-14 PROCEDURE — 93005 ELECTROCARDIOGRAM TRACING: CPT | Performed by: EMERGENCY MEDICINE

## 2021-06-14 PROCEDURE — 6360000002 HC RX W HCPCS: Performed by: EMERGENCY MEDICINE

## 2021-06-14 RX ADMIN — CALCIUM GLUCONATE 2000 MG: 94 INJECTION, SOLUTION INTRAVENOUS at 01:32

## 2021-06-14 NOTE — ED PROVIDER NOTES
[facility]  eMERGENCY dEPARTMENT eNCOUnter          CHIEF COMPLAINT       Chief Complaint   Patient presents with    Other     Hypocalcemia        Nurses Notes reviewed and I agree except as noted in the HPI. HISTORY OF PRESENT ILLNESS    Jose Luna is a 36 y.o. female who presents hypocalcemia. Patient has had a total thyroidectomy in the past.  She had problems with the parathyroid gland. She is coming in today because she noticed that her reflexes were getting spastic, she was having cramping of her hands and she was having twitching of her face. These are all signs of her hypocalcemia. Patient denies any other physical problems at this time. Currently the patient is resting comfortably on the cot no apparent distress no other physical complaints noted. REVIEW OF SYSTEMS     Review of Systems   Constitutional: Negative for activity change, appetite change, diaphoresis, fatigue and unexpected weight change. HENT: Negative for congestion, ear discharge, ear pain, hearing loss, rhinorrhea, sinus pressure, sore throat, trouble swallowing and voice change. Eyes: Negative for photophobia, pain, discharge, redness and itching. Respiratory: Negative for cough, choking, chest tightness, shortness of breath and wheezing. Cardiovascular: Negative for chest pain, palpitations and leg swelling. Gastrointestinal: Negative for abdominal distention, abdominal pain, blood in stool, constipation, diarrhea, nausea and vomiting. Endocrine: Negative for polydipsia, polyphagia and polyuria. Genitourinary: Negative for decreased urine volume, difficulty urinating, dysuria, enuresis, frequency, hematuria and urgency. Musculoskeletal: Negative for arthralgias, back pain, gait problem, myalgias, neck pain and neck stiffness. Skin: Negative for pallor and rash. Allergic/Immunologic: Negative for immunocompromised state.    Neurological: Negative for dizziness, tremors, seizures, syncope, facial asymmetry, weakness, light-headedness, numbness and headaches. Hyperreflexic   Hematological: Negative for adenopathy. Does not bruise/bleed easily. Psychiatric/Behavioral: Negative for agitation, confusion, decreased concentration, hallucinations and suicidal ideas. The patient is not nervous/anxious. PAST MEDICAL HISTORY    has a past medical history of Adjustment disorder, Anxiety, GERD (gastroesophageal reflux disease), Hypothyroidism, Obesity, Seasonal allergies, and Thyroid nodule. SURGICAL HISTORY      has a past surgical history that includes  section (,,); Cholecystectomy (); Hysterectomy (); Tubal ligation (); Bariatric Surgery (11/15/2018); Upper gastrointestinal endoscopy (11/15/2018); other surgical history (11/15/2018); Tonsillectomy; and Thyroidectomy (N/A, 2019).     CURRENT MEDICATIONS       Discharge Medication List as of 2021  2:37 AM      CONTINUE these medications which have NOT CHANGED    Details   calcitRIOL (ROCALTROL) 0.5 MCG capsule Take 2 capsules by mouth 4 times daily, Disp-240 capsule, R-0Print      ergocalciferol (ERGOCALCIFEROL) 1.25 MG (71629 UT) capsule Take 1 capsule by mouth once a week, Disp-4 capsule, R-0Print      busPIRone (BUSPAR) 7.5 MG tablet Take 7.5 mg by mouth as neededHistorical Med      calcium citrate (CALCITRATE) 250 MG TABS tablet Take 4 tablets by mouth 4 times daily, Disp-480 tablet,R-0Normal      magnesium oxide (MAG-OX) 400 (241.3 Mg) MG TABS tablet Take 1 tablet by mouth daily for 7 days, Disp-7 tablet,R-0Normal      Loratadine-Pseudoephedrine (CLARITIN-D 24 HOUR PO) Take 1 tablet by mouth dailyHistorical Med      levothyroxine (SYNTHROID) 200 MCG tablet Take 200 mcg by mouth DailyHistorical Med      ondansetron (ZOFRAN ODT) 4 MG disintegrating tablet Take 1 tablet by mouth every 8 hours as needed for Nausea, Disp-20 tablet, R-0Print      vitamin B-12 (CYANOCOBALAMIN) 500 MCG tablet Take 1 tablet by mouth daily, R-6Historical Med      spironolactone (ALDACTONE) 50 MG tablet Take 1 tablet by mouth as needed , R-6Historical Med      furosemide (LASIX) 40 MG tablet Take 1 tablet by mouth as needed , R-6Historical Med      estradiol (ESTRACE) 2 MG tablet Take 1 tablet by mouth daily, R-6Historical Med      Multiple Vitamin (MULTI VITAMIN DAILY PO) Take by mouth Indications: flinstonesHistorical Med             ALLERGIES     is allergic to latex. FAMILY HISTORY     She indicated that her mother is alive. She indicated that her father is alive. She indicated that her sister is alive. She indicated that her brother is alive. She indicated that her maternal grandmother is . She indicated that her maternal grandfather is . She indicated that her paternal grandmother is alive. She indicated that her paternal grandfather is . family history includes Arthritis in her father; Cancer in her paternal grandmother; Diabetes in her father, maternal grandfather, and paternal grandfather; Hypertension in her father; Obesity in her maternal grandmother; Other in her mother. SOCIAL HISTORY      reports that she has been smoking. She has been smoking about 1.00 pack per day. She has never used smokeless tobacco. She reports current alcohol use. She reports that she does not use drugs. PHYSICAL EXAM     INITIAL VITALS:  oral temperature is 97.9 °F (36.6 °C). Her blood pressure is 110/66 and her pulse is 76. Her respiration is 16 and oxygen saturation is 94%. Physical Exam  Vitals and nursing note reviewed. Constitutional:       General: She is not in acute distress. Appearance: She is well-developed. She is not diaphoretic. HENT:      Head: Normocephalic and atraumatic. Right Ear: External ear normal.      Left Ear: External ear normal.      Nose: Nose normal.      Mouth/Throat:      Pharynx: No oropharyngeal exudate. Eyes:      General: No scleral icterus. Right eye: No discharge. Left eye: No discharge. Conjunctiva/sclera: Conjunctivae normal.      Pupils: Pupils are equal, round, and reactive to light. Neck:      Thyroid: No thyromegaly. Vascular: No JVD. Trachea: No tracheal deviation. Cardiovascular:      Rate and Rhythm: Normal rate and regular rhythm. Heart sounds: Normal heart sounds, S1 normal and S2 normal. No murmur heard. No friction rub. No gallop. Pulmonary:      Effort: Pulmonary effort is normal.      Breath sounds: Normal breath sounds. No stridor. No wheezing, rhonchi or rales. Chest:      Chest wall: No tenderness. Abdominal:      General: Bowel sounds are normal. There is no distension. Palpations: Abdomen is soft. There is no mass. Tenderness: There is no abdominal tenderness. There is no guarding or rebound. Hernia: No hernia is present. Musculoskeletal:         General: No tenderness. Normal range of motion. Cervical back: Normal range of motion and neck supple. Lymphadenopathy:      Cervical: No cervical adenopathy. Skin:     General: Skin is warm and dry. Findings: No bruising, ecchymosis, lesion or rash. Neurological:      Mental Status: She is alert and oriented to person, place, and time. GCS: GCS eye subscore is 4. GCS verbal subscore is 5. GCS motor subscore is 6. Cranial Nerves: Cranial nerves are intact. No cranial nerve deficit. Sensory: Sensation is intact. Motor: Motor function is intact. Coordination: Coordination is intact. Coordination normal.      Gait: Gait is intact. Deep Tendon Reflexes:      Reflex Scores:       Tricep reflexes are 3+ on the right side and 3+ on the left side. Bicep reflexes are 3+ on the right side and 3+ on the left side. Brachioradialis reflexes are 3+ on the right side and 3+ on the left side. Patellar reflexes are 3+ on the right side and 3+ on the left side.        Achilles reflexes are 3+ on the right side and 3+ on the left side. Comments:  Chvostek sign Trousseau sign positive   Psychiatric:         Speech: Speech normal.         Behavior: Behavior normal.         Thought Content: Thought content normal.         Judgment: Judgment normal.           DIFFERENTIAL DIAGNOSIS:   Hypercalcemia hypercalcemia electrolyte imbalance    DIAGNOSTIC RESULTS     EKG: All EKG's are interpreted by the Emergency Department Physician who either signs or Co-signs this chart in the absence of a cardiologist.  EKG revealed low voltage criteria, normal sinus rhythm, normal axis, ventricular rate of 77 CO interval 156 QRS duration 54 QT intervals 356 QTC of 402. RADIOLOGY: non-plain film images(s) such as CT, Ultrasound and MRI are read by the radiologist.  No orders to display         LABS:   Labs Reviewed   CBC WITH AUTO DIFFERENTIAL - Abnormal; Notable for the following components:       Result Value    MCHC 31.7 (*)     RDW-CV 14.9 (*)     RDW-SD 51.4 (*)     All other components within normal limits   COMPREHENSIVE METABOLIC PANEL - Abnormal; Notable for the following components:    CREATININE 1.4 (*)     Calcium 5.8 (*)     AST 55 (*)     All other components within normal limits   CALCIUM, IONIZED - Abnormal; Notable for the following components:    Calcium, Ion 0.64 (*)     All other components within normal limits   GLOMERULAR FILTRATION RATE, ESTIMATED - Abnormal; Notable for the following components:    Est, Glom Filt Rate 42 (*)     All other components within normal limits   MAGNESIUM   ANION GAP   OSMOLALITY       EMERGENCY DEPARTMENT COURSE:   Vitals:    Vitals:    06/13/21 2355 06/14/21 0132   BP: 114/69 110/66   Pulse: 78 76   Resp: 18 16   Temp: 97.9 °F (36.6 °C)    TempSrc: Oral    SpO2: 95% 94%     Patient assessed at bedside appropriate labs and imaging were ordered. Patient has been here multiple times for this. 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   Patient's calcium was 5.8 here today. Ionized calcium is 0.64. At this point I feel the patient needs to have calcium. She had 2 g of calcium given here over 2 hours. She did quite well with the infusion. At this point I feel the patient be safely discharged home and follow-up as directed. Patient understood and agreed with the plan. Patient is subsequently discharged home in good condition. Patient has what appears to be hypocalcemia. Patient is instructed to follow-up with the primary care physician to do so within the next 1 to 2 days. She is instructed take all her current medications as prescribed. She is instructed return to the nearest emergency room immediately for any new or worsening complaints. CRITICAL CARE:   None    CONSULTS:  None    PROCEDURES:  None    FINAL IMPRESSION      1. Hypoparathyroidism after procedure ([address]    2.  Hypocalcemia          DISPOSITION/PLAN   Discharge    PATIENT REFERRED TO:  EBEN Cornell - CNP  [address] [place] [address]    Call in 1 day        DISCHARGE MEDICATIONS:  Discharge Medication List as of 6/14/2021  2:37 AM          (Please note that portions of this note were completed with a voice recognition program.  Efforts were made to edit the dictations but occasionally words are mis-transcribed.)    Jessica Kulkarni, [address], DO  06/15/21 9267

## 2021-06-14 NOTE — ED TRIAGE NOTES
Pt comes in through ED lobby. She has a history of hypocalcemia and frequently has to have iron infusions. The past couple days she has had symptoms of this and muscles have been tense. She tried to take her medications the past couple days but symptoms have not relieved.

## 2021-06-14 NOTE — ED NOTES
Pt resting in bed with call light in reach, states no further needs at this time. No distress noted at this time, pt family at bedside.        Liz, [address] [facility]  06/14/21 3275

## 2021-06-14 NOTE — ED NOTES
Pt resting in bed on phone, family on floor playing on phone. Pt states no further needs, pt breaths easy and unlabored, no distress noted at this time. Call light in reach.        [facility]  06/14/21 9962

## 2021-06-15 ASSESSMENT — ENCOUNTER SYMPTOMS
BACK PAIN: 0
CHEST TIGHTNESS: 0
VOICE CHANGE: 0
SORE THROAT: 0
COUGH: 0
CHOKING: 0
ABDOMINAL PAIN: 0
VOMITING: 0
EYE DISCHARGE: 0
RHINORRHEA: 0
CONSTIPATION: 0
EYE PAIN: 0
SINUS PRESSURE: 0
PHOTOPHOBIA: 0
NAUSEA: 0
WHEEZING: 0
ABDOMINAL DISTENTION: 0
BLOOD IN STOOL: 0
EYE ITCHING: 0
DIARRHEA: 0
TROUBLE SWALLOWING: 0
EYE REDNESS: 0
SHORTNESS OF BREATH: 0

## 2021-07-12 ENCOUNTER — HOSPITAL ENCOUNTER (EMERGENCY)
Age: 41
Discharge: HOME | End: 2021-07-13
Attending: EMERGENCY MEDICINE
Payer: COMMERCIAL

## 2021-07-12 VITALS
WEIGHT: 265 LBS | BODY MASS INDEX: 46.94 KG/M2 | HEART RATE: 69 BPM | SYSTOLIC BLOOD PRESSURE: 92 MMHG | DIASTOLIC BLOOD PRESSURE: 67 MMHG | RESPIRATION RATE: 10 BRPM | TEMPERATURE: 98.4 F | OXYGEN SATURATION: 96 %

## 2021-07-12 DIAGNOSIS — E89.2 HYPOPARATHYROIDISM AFTER PROCEDURE (HCC): ICD-10-CM

## 2021-07-12 DIAGNOSIS — E83.51 HYPOCALCEMIA: Primary | ICD-10-CM

## 2021-07-12 LAB
ANION GAP SERPL CALCULATED.3IONS-SCNC: 16 MEQ/L (ref 8–16)
BASOPHILS # BLD: 1.2 %
BASOPHILS ABSOLUTE: 0.1 THOU/MM3 (ref 0–0.1)
BUN BLDV-MCNC: 17 MG/DL (ref 7–22)
CALCIUM IONIZED: 0.65 MMOL/L (ref 1.12–1.32)
CALCIUM SERPL-MCNC: 5.8 MG/DL (ref 8.5–10.5)
CHLORIDE BLD-SCNC: 101 MEQ/L (ref 98–111)
CO2: 26 MEQ/L (ref 23–33)
CREAT SERPL-MCNC: 1.5 MG/DL (ref 0.4–1.2)
EOSINOPHIL # BLD: 2.8 %
EOSINOPHILS ABSOLUTE: 0.2 THOU/MM3 (ref 0–0.4)
ERYTHROCYTE [DISTWIDTH] IN BLOOD BY AUTOMATED COUNT: 15.7 % (ref 11.5–14.5)
ERYTHROCYTE [DISTWIDTH] IN BLOOD BY AUTOMATED COUNT: 52.4 FL (ref 35–45)
GFR SERPL CREATININE-BSD FRML MDRD: 38 ML/MIN/1.73M2
GLUCOSE BLD-MCNC: 94 MG/DL (ref 70–108)
HCT VFR BLD CALC: 40.7 % (ref 37–47)
HEMOGLOBIN: 13.1 GM/DL (ref 12–16)
IMMATURE GRANS (ABS): 0.01 THOU/MM3 (ref 0–0.07)
IMMATURE GRANULOCYTES: 0.2 %
LYMPHOCYTES # BLD: 36.7 %
LYMPHOCYTES ABSOLUTE: 2.1 THOU/MM3 (ref 1–4.8)
MAGNESIUM: 1.8 MG/DL (ref 1.6–2.4)
MCH RBC QN AUTO: 29.4 PG (ref 26–33)
MCHC RBC AUTO-ENTMCNC: 32.2 GM/DL (ref 32.2–35.5)
MCV RBC AUTO: 91.3 FL (ref 81–99)
MONOCYTES # BLD: 5.5 %
MONOCYTES ABSOLUTE: 0.3 THOU/MM3 (ref 0.4–1.3)
NUCLEATED RED BLOOD CELLS: 0 /100 WBC
OSMOLALITY CALCULATION: 286.3 MOSMOL/KG (ref 275–300)
PLATELET # BLD: 171 THOU/MM3 (ref 130–400)
PMV BLD AUTO: 12.5 FL (ref 9.4–12.4)
POTASSIUM SERPL-SCNC: 4.5 MEQ/L (ref 3.5–5.2)
RBC # BLD: 4.46 MILL/MM3 (ref 4.2–5.4)
SEG NEUTROPHILS: 53.6 %
SEGMENTED NEUTROPHILS ABSOLUTE COUNT: 3.1 THOU/MM3 (ref 1.8–7.7)
SODIUM BLD-SCNC: 143 MEQ/L (ref 135–145)
WBC # BLD: 5.7 THOU/MM3 (ref 4.8–10.8)

## 2021-07-12 PROCEDURE — 96365 THER/PROPH/DIAG IV INF INIT: CPT

## 2021-07-12 PROCEDURE — 80048 BASIC METABOLIC PNL TOTAL CA: CPT

## 2021-07-12 PROCEDURE — 6360000002 HC RX W HCPCS: Performed by: EMERGENCY MEDICINE

## 2021-07-12 PROCEDURE — 36415 COLL VENOUS BLD VENIPUNCTURE: CPT

## 2021-07-12 PROCEDURE — 82330 ASSAY OF CALCIUM: CPT

## 2021-07-12 PROCEDURE — 2580000003 HC RX 258: Performed by: EMERGENCY MEDICINE

## 2021-07-12 PROCEDURE — 96366 THER/PROPH/DIAG IV INF ADDON: CPT

## 2021-07-12 PROCEDURE — 85025 COMPLETE CBC W/AUTO DIFF WBC: CPT

## 2021-07-12 PROCEDURE — 83735 ASSAY OF MAGNESIUM: CPT

## 2021-07-12 PROCEDURE — 99284 EMERGENCY DEPT VISIT MOD MDM: CPT

## 2021-07-12 PROCEDURE — 93005 ELECTROCARDIOGRAM TRACING: CPT | Performed by: EMERGENCY MEDICINE

## 2021-07-12 RX ADMIN — CALCIUM GLUCONATE 2000 MG: 98 INJECTION, SOLUTION INTRAVENOUS at 23:23

## 2021-07-12 ASSESSMENT — PAIN DESCRIPTION - FREQUENCY: FREQUENCY: CONTINUOUS

## 2021-07-12 ASSESSMENT — PAIN DESCRIPTION - PAIN TYPE: TYPE: ACUTE PAIN

## 2021-07-12 ASSESSMENT — PAIN SCALES - GENERAL: PAINLEVEL_OUTOF10: 6

## 2021-07-12 ASSESSMENT — PAIN DESCRIPTION - LOCATION: LOCATION: GENERALIZED

## 2021-07-12 ASSESSMENT — PAIN DESCRIPTION - DESCRIPTORS: DESCRIPTORS: SPASM;CRAMPING

## 2021-07-13 LAB
EKG ATRIAL RATE: 78 BPM
EKG P AXIS: 83 DEGREES
EKG P-R INTERVAL: 136 MS
EKG Q-T INTERVAL: 350 MS
EKG QRS DURATION: 48 MS
EKG QTC CALCULATION (BAZETT): 399 MS
EKG R AXIS: 111 DEGREES
EKG T AXIS: -28 DEGREES
EKG VENTRICULAR RATE: 78 BPM

## 2021-07-13 PROCEDURE — 93010 ELECTROCARDIOGRAM REPORT: CPT | Performed by: NUCLEAR MEDICINE

## 2021-07-13 ASSESSMENT — ENCOUNTER SYMPTOMS
CHOKING: 0
DIARRHEA: 0
RHINORRHEA: 0
EYE DISCHARGE: 0
VOMITING: 0
ABDOMINAL PAIN: 0
TROUBLE SWALLOWING: 0
SHORTNESS OF BREATH: 0
WHEEZING: 0
NAUSEA: 0
BLOOD IN STOOL: 0
EYE ITCHING: 0
EYE PAIN: 0
PHOTOPHOBIA: 0
EYE REDNESS: 0
ABDOMINAL DISTENTION: 0
SINUS PRESSURE: 0
BACK PAIN: 0
VOICE CHANGE: 0
COUGH: 0
CHEST TIGHTNESS: 0
CONSTIPATION: 0
SORE THROAT: 0

## 2021-07-13 NOTE — ED TRIAGE NOTES
Patient presents to ED with chief complaint of low calcium. Patient states that this has happened to her many times in the past due to her having issues with her parathyroid gland and after she had a gastric bypass. Patient states that she has cramping all over rated at a 6/10 at this time. AOx4. Respirations unlabored and regular. Call light in reach.

## 2021-07-13 NOTE — ED PROVIDER NOTES
[facility]  eMERGENCY dEPARTMENT eNCOUnter          [address] [facility]       Chief Complaint   Patient presents with    Other     Needs calcium replacement. Nurses Notes reviewed and I agree except as noted in the HPI. HISTORY OF PRESENT ILLNESS    Gerald Buchanan is a 36 y.o. female who presents for feelings that her calcium was low. Patient does have hyperparathyroidism and she had a subtotal thyroidectomy which affected her parathyroid glands. She is on calcium replacement however she states she notices that she starts getting more twitchy and nervous when her calcium is low. Patient states that she noticed the symptoms and decided to come in for evaluation and treatment. Patient does not complain of any chest pain or shortness of breath. She has not had any syncopal or presyncopal episodes. She is not having any abdominal pain or changes in bowel or bladder habits. Patient is otherwise asymptomatic resting comfortably on the cot in no apparent distress no other physical complaints at this time. REVIEW OF SYSTEMS     Review of Systems   Constitutional: Negative for activity change, appetite change, diaphoresis, fatigue and unexpected weight change. HENT: Negative for congestion, ear discharge, ear pain, hearing loss, rhinorrhea, sinus pressure, sore throat, trouble swallowing and voice change. Eyes: Negative for photophobia, pain, discharge, redness and itching. Respiratory: Negative for cough, choking, chest tightness, shortness of breath and wheezing. Cardiovascular: Negative for chest pain, palpitations and leg swelling. Gastrointestinal: Negative for abdominal distention, abdominal pain, blood in stool, constipation, diarrhea, nausea and vomiting. Endocrine: Negative for polydipsia, polyphagia and polyuria. Genitourinary: Negative for decreased urine volume, difficulty urinating, dysuria, enuresis, frequency, hematuria and urgency.    Musculoskeletal: Positive for myalgias. Negative for arthralgias, back pain, gait problem, neck pain and neck stiffness. Skin: Negative for pallor and rash. Allergic/Immunologic: Negative for immunocompromised state. Neurological: Negative for dizziness, tremors, seizures, syncope, facial asymmetry, weakness, light-headedness, numbness and headaches. Hematological: Negative for adenopathy. Does not bruise/bleed easily. Psychiatric/Behavioral: Negative for agitation, hallucinations and suicidal ideas. The patient is nervous/anxious. PAST MEDICAL HISTORY    has a past medical history of Adjustment disorder, Anxiety, GERD (gastroesophageal reflux disease), Hypothyroidism, Obesity, Seasonal allergies, and Thyroid nodule. SURGICAL HISTORY      has a past surgical history that includes  section (,,); Cholecystectomy (); Hysterectomy (); Tubal ligation (); Bariatric Surgery (11/15/2018); Upper gastrointestinal endoscopy (11/15/2018); other surgical history (11/15/2018); Tonsillectomy; and Thyroidectomy (N/A, 2019).     CURRENT MEDICATIONS       Previous Medications    BUSPIRONE (BUSPAR) 7.5 MG TABLET    Take 7.5 mg by mouth as needed    CALCITRIOL (ROCALTROL) 0.5 MCG CAPSULE    Take 2 capsules by mouth 4 times daily    CALCIUM CITRATE (CALCITRATE) 250 MG TABS TABLET    Take 4 tablets by mouth 4 times daily    ERGOCALCIFEROL (ERGOCALCIFEROL) 1.25 MG (71375 UT) CAPSULE    Take 1 capsule by mouth once a week    ESTRADIOL (ESTRACE) 2 MG TABLET    Take 1 tablet by mouth daily    FUROSEMIDE (LASIX) 40 MG TABLET    Take 1 tablet by mouth as needed     LEVOTHYROXINE (SYNTHROID) 200 MCG TABLET    Take 200 mcg by mouth Daily    LORATADINE-PSEUDOEPHEDRINE (CLARITIN-D 24 HOUR PO)    Take 1 tablet by mouth daily    MAGNESIUM OXIDE (MAG-OX) 400 (241.3 MG) MG TABS TABLET    Take 1 tablet by mouth daily for 7 days    MULTIPLE VITAMIN (MULTI VITAMIN DAILY PO)    Take by mouth Indications: fabiola ONDANSETRON (ZOFRAN ODT) 4 MG DISINTEGRATING TABLET    Take 1 tablet by mouth every 8 hours as needed for Nausea    SPIRONOLACTONE (ALDACTONE) 50 MG TABLET    Take 1 tablet by mouth as needed     VITAMIN B-12 (CYANOCOBALAMIN) 500 MCG TABLET    Take 1 tablet by mouth daily       ALLERGIES     is allergic to latex. FAMILY HISTORY     She indicated that her mother is alive. She indicated that her father is alive. She indicated that her sister is alive. She indicated that her brother is alive. She indicated that her maternal grandmother is . She indicated that her maternal grandfather is . She indicated that her paternal grandmother is alive. She indicated that her paternal grandfather is . family history includes Arthritis in her father; Cancer in her paternal grandmother; Diabetes in her father, maternal grandfather, and paternal grandfather; Hypertension in her father; Obesity in her maternal grandmother; Other in her mother. SOCIAL HISTORY      reports that she has been smoking. She has been smoking about 1.00 pack per day. She has never used smokeless tobacco. She reports current alcohol use. She reports that she does not use drugs. PHYSICAL EXAM     INITIAL VITALS:  weight is 265 lb (120.2 kg). Her temperature is 98.4 °F (36.9 °C). Her blood pressure is 100/81 and her pulse is 74. Her respiration is 16 and oxygen saturation is 98%. Physical Exam  Vitals and nursing note reviewed. Constitutional:       General: She is not in acute distress. Appearance: She is well-developed. She is not diaphoretic. HENT:      Head: Normocephalic and atraumatic. Right Ear: External ear normal.      Left Ear: External ear normal.      Nose: Nose normal.      Mouth/Throat:      Pharynx: No oropharyngeal exudate. Eyes:      General: No scleral icterus. Right eye: No discharge. Left eye: No discharge.       Conjunctiva/sclera: Conjunctivae normal.      Pupils: Pupils are equal, round, and reactive to light. Neck:      Thyroid: No thyromegaly. Vascular: No JVD. Trachea: No tracheal deviation. Cardiovascular:      Rate and Rhythm: Normal rate and regular rhythm. Heart sounds: Normal heart sounds, S1 normal and S2 normal. No murmur heard. No friction rub. No gallop. Pulmonary:      Effort: Pulmonary effort is normal.      Breath sounds: Normal breath sounds. No stridor. No wheezing, rhonchi or rales. Chest:      Chest wall: No tenderness. Abdominal:      General: Bowel sounds are normal. There is no distension. Palpations: Abdomen is soft. There is no mass. Tenderness: There is no abdominal tenderness. There is no guarding or rebound. Hernia: No hernia is present. Musculoskeletal:         General: No tenderness. Normal range of motion. Cervical back: Normal range of motion and neck supple. Lymphadenopathy:      Cervical: No cervical adenopathy. Skin:     General: Skin is warm and dry. Capillary Refill: Capillary refill takes less than 2 seconds. Findings: No bruising, ecchymosis, lesion or rash. Neurological:      Mental Status: She is alert and oriented to person, place, and time. GCS: GCS eye subscore is 4. GCS verbal subscore is 5. GCS motor subscore is 6. Cranial Nerves: Cranial nerves are intact. No cranial nerve deficit. Sensory: Sensation is intact. Motor: Motor function is intact. Coordination: Coordination is intact. Coordination normal.      Gait: Gait is intact. Deep Tendon Reflexes:      Reflex Scores:       Tricep reflexes are 3+ on the right side and 3+ on the left side. Bicep reflexes are 3+ on the right side and 3+ on the left side. Brachioradialis reflexes are 3+ on the right side and 3+ on the left side. Patellar reflexes are 3+ on the right side and 3+ on the left side. Achilles reflexes are 3+ on the right side and 3+ on the left side. Comments: Patient is slightly hyperreflexic, she Chvostek's and Trousseau sign were negative. Psychiatric:         Speech: Speech normal.         Behavior: Behavior normal.         Thought Content: Thought content normal.         Judgment: Judgment normal.           DIFFERENTIAL DIAGNOSIS:   Hypocalcemia due to hypoparathyroidism status post surgical    DIAGNOSTIC RESULTS     EKG: All EKG's are interpreted by the Emergency Department Physician who either signs or Co-signs this chart in the absence of a cardiologist.  EKG reveals normal sinus rhythm, normal axis, low voltage, ventricular rate of 78 NC interval 136 QRS duration of 48 QT interval 350 QTC of 399. RADIOLOGY: non-plain film images(s) such as CT, Ultrasound and MRI are read by the radiologist.  No orders to display         LABS:   Labs Reviewed   CBC WITH AUTO DIFFERENTIAL - Abnormal; Notable for the following components:       Result Value    RDW-CV 15.7 (*)     RDW-SD 52.4 (*)     MPV 12.5 (*)     Monocytes Absolute 0.3 (*)     All other components within normal limits   BASIC METABOLIC PANEL - Abnormal; Notable for the following components:    CREATININE 1.5 (*)     Calcium 5.8 (*)     All other components within normal limits   CALCIUM, IONIZED - Abnormal; Notable for the following components:    Calcium, Ion 0.65 (*)     All other components within normal limits   GLOMERULAR FILTRATION RATE, ESTIMATED - Abnormal; Notable for the following components:    Est, Glom Filt Rate 38 (*)     All other components within normal limits   MAGNESIUM   ANION GAP   OSMOLALITY       EMERGENCY DEPARTMENT COURSE:   Vitals:    Vitals:    07/12/21 2133 07/12/21 2249   BP: 110/83 100/81   Pulse: 79 74   Resp: 15 16   Temp: 98.4 °F (36.9 °C)    SpO2: 98% 98%   Weight: 265 lb (120.2 kg)      Patient was assessed at bedside appropriate labs and imaging were ordered. Patient was neurologically intact here. Hemodynamically stable.   Patient's calcium and ionized calcium are both low. Patient was given 2 g of calcium here. I went back and reassessed the patient. She is doing much better. At this point for the patient be safely discharged home. She was instructed to continue to take all her medications as prescribed. She states she has her medications at home. She was instructed to follow-up with a primary care physician and do so within the next 1 to 2 days. Patient understood and agreed with the plan. Patient is instructed to return the emergency room immediately for any new or worsening complaints. Patient has what appears to be hypercalcemia and hyperparathyroidism after surgery. Patient is instructed to take all current medications as prescribed. She is instructed to follow-up with her primary care physician to do so within the next 1 to 2 days. She is instructed to return to the nearest emergency room immediately for any new or worsening complaints. CRITICAL CARE:   None    CONSULTS:  None    PROCEDURES:  None    FINAL IMPRESSION      1. Hypocalcemia    2.  Hypoparathyroidism after procedure [facility])          DISPOSITION/PLAN   Discharge    PATIENT REFERRED TO:  EBEN Gutierrez - CNP  [place] [address] [place] [address]  [phone]    Call in 1 day        DISCHARGE MEDICATIONS:  New Prescriptions    No medications on file       (Please note that portions of this note were completed with a voice recognition program.  Efforts were made to edit the dictations but occasionally words are mis-transcribed.)    DO Augusto Epperson DO  07/13/21 0200

## 2021-07-13 NOTE — ED NOTES
Pt medicated per MAR. Pt A&Ox4. Pt using cell phone at this time. Call light in reach.  Will continue to monitor      Boy Fonseca RN  07/12/21 1230

## 2021-07-13 NOTE — ED NOTES
Patient resting in bed. Respirations easy and unlabored. No distress noted. Call light within reach.        Randy Ramirez RN  07/12/21 0934

## 2021-07-18 ENCOUNTER — HOSPITAL ENCOUNTER (EMERGENCY)
Age: 41
Discharge: HOME | End: 2021-07-19
Attending: EMERGENCY MEDICINE
Payer: COMMERCIAL

## 2021-07-18 DIAGNOSIS — E83.51 HYPOCALCEMIA: Primary | ICD-10-CM

## 2021-07-18 LAB
ALBUMIN SERPL-MCNC: 4.2 G/DL (ref 3.5–5.1)
ALP BLD-CCNC: 94 U/L (ref 38–126)
ALT SERPL-CCNC: 28 U/L (ref 11–66)
ANION GAP SERPL CALCULATED.3IONS-SCNC: 14 MEQ/L (ref 8–16)
AST SERPL-CCNC: 39 U/L (ref 5–40)
BASOPHILS # BLD: 1.2 %
BASOPHILS ABSOLUTE: 0.1 THOU/MM3 (ref 0–0.1)
BILIRUB SERPL-MCNC: 0.4 MG/DL (ref 0.3–1.2)
BUN BLDV-MCNC: 19 MG/DL (ref 7–22)
CALCIUM IONIZED: 0.64 MMOL/L (ref 1.12–1.32)
CALCIUM SERPL-MCNC: 6.1 MG/DL (ref 8.5–10.5)
CHLORIDE BLD-SCNC: 98 MEQ/L (ref 98–111)
CO2: 25 MEQ/L (ref 23–33)
CREAT SERPL-MCNC: 1.3 MG/DL (ref 0.4–1.2)
EOSINOPHIL # BLD: 3.1 %
EOSINOPHILS ABSOLUTE: 0.2 THOU/MM3 (ref 0–0.4)
ERYTHROCYTE [DISTWIDTH] IN BLOOD BY AUTOMATED COUNT: 15.5 % (ref 11.5–14.5)
ERYTHROCYTE [DISTWIDTH] IN BLOOD BY AUTOMATED COUNT: 52.3 FL (ref 35–45)
GFR SERPL CREATININE-BSD FRML MDRD: 45 ML/MIN/1.73M2
GLUCOSE BLD-MCNC: 91 MG/DL (ref 70–108)
HCT VFR BLD CALC: 42.4 % (ref 37–47)
HEMOGLOBIN: 13.5 GM/DL (ref 12–16)
IMMATURE GRANS (ABS): 0.03 THOU/MM3 (ref 0–0.07)
IMMATURE GRANULOCYTES: 0.4 %
LYMPHOCYTES # BLD: 34.2 %
LYMPHOCYTES ABSOLUTE: 2.3 THOU/MM3 (ref 1–4.8)
MAGNESIUM: 1.8 MG/DL (ref 1.6–2.4)
MCH RBC QN AUTO: 29.5 PG (ref 26–33)
MCHC RBC AUTO-ENTMCNC: 31.8 GM/DL (ref 32.2–35.5)
MCV RBC AUTO: 92.6 FL (ref 81–99)
MONOCYTES # BLD: 5.1 %
MONOCYTES ABSOLUTE: 0.3 THOU/MM3 (ref 0.4–1.3)
NUCLEATED RED BLOOD CELLS: 0 /100 WBC
OSMOLALITY CALCULATION: 275.7 MOSMOL/KG (ref 275–300)
PHOSPHORUS: 6.5 MG/DL (ref 2.4–4.7)
PLATELET # BLD: 171 THOU/MM3 (ref 130–400)
PMV BLD AUTO: 12.7 FL (ref 9.4–12.4)
POTASSIUM REFLEX MAGNESIUM: 4.1 MEQ/L (ref 3.5–5.2)
PREGNANCY, SERUM: NEGATIVE
RBC # BLD: 4.58 MILL/MM3 (ref 4.2–5.4)
SEG NEUTROPHILS: 56 %
SEGMENTED NEUTROPHILS ABSOLUTE COUNT: 3.8 THOU/MM3 (ref 1.8–7.7)
SODIUM BLD-SCNC: 137 MEQ/L (ref 135–145)
TOTAL PROTEIN: 6.9 G/DL (ref 6.1–8)
VITAMIN D 25-HYDROXY: 15 NG/ML (ref 30–100)
WBC # BLD: 6.7 THOU/MM3 (ref 4.8–10.8)

## 2021-07-18 PROCEDURE — 85025 COMPLETE CBC W/AUTO DIFF WBC: CPT

## 2021-07-18 PROCEDURE — 84100 ASSAY OF PHOSPHORUS: CPT

## 2021-07-18 PROCEDURE — 96365 THER/PROPH/DIAG IV INF INIT: CPT

## 2021-07-18 PROCEDURE — 96366 THER/PROPH/DIAG IV INF ADDON: CPT

## 2021-07-18 PROCEDURE — 82306 VITAMIN D 25 HYDROXY: CPT

## 2021-07-18 PROCEDURE — 93005 ELECTROCARDIOGRAM TRACING: CPT | Performed by: EMERGENCY MEDICINE

## 2021-07-18 PROCEDURE — 83735 ASSAY OF MAGNESIUM: CPT

## 2021-07-18 PROCEDURE — 80053 COMPREHEN METABOLIC PANEL: CPT

## 2021-07-18 PROCEDURE — 83970 ASSAY OF PARATHORMONE: CPT

## 2021-07-18 PROCEDURE — 84703 CHORIONIC GONADOTROPIN ASSAY: CPT

## 2021-07-18 PROCEDURE — 99284 EMERGENCY DEPT VISIT MOD MDM: CPT

## 2021-07-18 PROCEDURE — 82330 ASSAY OF CALCIUM: CPT

## 2021-07-18 PROCEDURE — 36415 COLL VENOUS BLD VENIPUNCTURE: CPT

## 2021-07-18 ASSESSMENT — ENCOUNTER SYMPTOMS
EYE PAIN: 0
DIARRHEA: 0
CONSTIPATION: 0
SHORTNESS OF BREATH: 0
COUGH: 0
ABDOMINAL PAIN: 0

## 2021-07-19 VITALS
BODY MASS INDEX: 48.9 KG/M2 | TEMPERATURE: 97.9 F | RESPIRATION RATE: 18 BRPM | HEART RATE: 82 BPM | DIASTOLIC BLOOD PRESSURE: 90 MMHG | WEIGHT: 276 LBS | SYSTOLIC BLOOD PRESSURE: 117 MMHG | OXYGEN SATURATION: 97 % | HEIGHT: 63 IN

## 2021-07-19 LAB
EKG ATRIAL RATE: 79 BPM
EKG P AXIS: 54 DEGREES
EKG P-R INTERVAL: 156 MS
EKG Q-T INTERVAL: 346 MS
EKG QRS DURATION: 54 MS
EKG QTC CALCULATION (BAZETT): 396 MS
EKG R AXIS: 111 DEGREES
EKG T AXIS: -87 DEGREES
EKG VENTRICULAR RATE: 79 BPM
PTH INTACT: 9.8 PG/ML (ref 15–65)

## 2021-07-19 PROCEDURE — 6360000002 HC RX W HCPCS: Performed by: STUDENT IN AN ORGANIZED HEALTH CARE EDUCATION/TRAINING PROGRAM

## 2021-07-19 PROCEDURE — 2580000003 HC RX 258: Performed by: STUDENT IN AN ORGANIZED HEALTH CARE EDUCATION/TRAINING PROGRAM

## 2021-07-19 PROCEDURE — 96365 THER/PROPH/DIAG IV INF INIT: CPT

## 2021-07-19 PROCEDURE — 96366 THER/PROPH/DIAG IV INF ADDON: CPT

## 2021-07-19 RX ADMIN — CALCIUM GLUCONATE 2000 MG: 98 INJECTION, SOLUTION INTRAVENOUS at 00:45

## 2021-07-19 NOTE — ED TRIAGE NOTES
Patient to ED from home with complaints of low calcium. Pt states no pain at this time. Pt states she starts a new job tomorrow and just wants her calcium replaced. Patient denies needs at this time.

## 2021-07-19 NOTE — ED NOTES
Pt denies needs at this time. Patient calcium infusing at this time. Pt vital signs stable and respirations unlabored.       Debbie Garcia RN  07/19/21 7953

## 2021-07-19 NOTE — ED PROVIDER NOTES
I performed a history and physical examination of the patient and discussed management with the resident. I reviewed the residents note and agree with the documented findings and plan of care. Any areas of disagreement are noted on the chart. I was personally present for the key portions of any procedures. I have documented in the chart those procedures where I was not present during the key portions. I have reviewed the emergency nurses triage note. I agree with the chief complaint, past medical history, past surgical history, allergies, medications, social and family history as documented unless otherwise noted below. Documentation of the HPI, Physical Exam and Medical Decision Making performed by medical students or scribes is based on my personal performance of the HPI, PE and MDM. For Phys Assistant/ Nurse Practitioner cases/documentation I have personally evaluated this patient and have completed at least one if not all key elements of the E/M (history, physical exam, and MDM). My findings are as noted below         Patient is here for abnormal labs. Patient suffers from secondary hyperparathyroidism with resultant hypocalcemia. This was from a subtotal thyroidectomy which involved her parathyroid glands. Patient is stating that she is feeling more jittery and more jerky. Currently the patient is resting comfortably on cot no apparent distress no other physical complaints at this time. EKG shows a low voltage, normal axis, ventricular rate of 79 IN interval of 156 QRS duration 54 QT of 346 QTC of 396.     Labs Reviewed   CALCIUM, IONIZED - Abnormal; Notable for the following components:       Result Value    Calcium, Ion 0.64 (*)     All other components within normal limits   CBC WITH AUTO DIFFERENTIAL - Abnormal; Notable for the following components:    MCHC 31.8 (*)     RDW-CV 15.5 (*)     RDW-SD 52.3 (*)     MPV 12.7 (*)     Monocytes Absolute 0.3 (*)     All other components within normal limits COMPREHENSIVE METABOLIC PANEL W/ REFLEX TO MG FOR LOW K - Abnormal; Notable for the following components:    CREATININE 1.3 (*)     Calcium 6.1 (*)     All other components within normal limits   PTH, INTACT - Abnormal; Notable for the following components:    Pth Intact 9.8 (*)     All other components within normal limits   VITAMIN D 25 HYDROXY - Abnormal; Notable for the following components:    Vit D, 25-Hydroxy 15 (*)     All other components within normal limits   PHOSPHORUS - Abnormal; Notable for the following components:    Phosphorus 6.5 (*)     All other components within normal limits   GLOMERULAR FILTRATION RATE, ESTIMATED - Abnormal; Notable for the following components:    Est, Glom Filt Rate 45 (*)     All other components within normal limits   MAGNESIUM   HCG, SERUM, QUALITATIVE   ANION GAP   OSMOLALITY   URINE RT REFLEX TO CULTURE     I have seen this patient with the resident Dr. Droeen Gonzalez and agree with his assessment and plan.      Kimberli Frost DO  07/20/21 3507

## 2021-07-19 NOTE — ED PROVIDER NOTES
[address] [address]          Pt Name: Kirsten Claudio  MRN: [id number]  [place] 1980  Date of evaluation: 7/18/2021  Treating Resident Physician: Lelo Davis MD  Supervising Physician: Dr. Aroldo You       Chief Complaint   Patient presents with    Abnormal Lab     calcium     History obtained from chart review and the patient. HISTORY OF PRESENT ILLNESS    HPI  Kirsten Claudio is a 36 y.o. female who presents to the emergency department for evaluation of hypocalcemia    Patient is here for abnormal labs. Patient suffers from secondary hyperparathyroidism with resultant hypocalcemia. This was from a subtotal thyroidectomy which involved her parathyroid glands. Patient is stating that she is feeling more jittery and more jerky, similar to her previous symptoms of hypocalcemia. Was treated for this last week without difficulty and presents again for recurrent symptoms. Patient says she is compliant with medications. Currently the patient is resting comfortably on cot no apparent distress no other physical complaints at this time. Patient does not complain of any chest pain or shortness of breath. She has not had any syncopal or presyncopal episodes. She is not having any abdominal pain or changes in bowel or bladder habits         The patient has no other acute complaints at this time. REVIEW OF SYSTEMS   Review of Systems   Constitutional: Positive for fatigue. Negative for appetite change and unexpected weight change. HENT: Negative for congestion and hearing loss. Eyes: Negative for pain and visual disturbance. Respiratory: Negative for cough and shortness of breath. Cardiovascular: Negative for chest pain and leg swelling. Gastrointestinal: Negative for abdominal pain, constipation and diarrhea.    Genitourinary: Negative for difficulty urinating, dysuria, hematuria, menstrual problem, pelvic pain, vaginal bleeding, vaginal discharge and vaginal pain. Musculoskeletal: Positive for myalgias. Negative for arthralgias. Psychiatric/Behavioral: Negative for behavioral problems and sleep disturbance. The patient is nervous/anxious.           PAST MEDICAL AND SURGICAL HISTORY     Past Medical History:   Diagnosis Date    Adjustment disorder     Anxiety     GERD (gastroesophageal reflux disease)     Hypothyroidism     Obesity     Seasonal allergies     Thyroid nodule      Past Surgical History:   Procedure Laterality Date    BARIATRIC SURGERY  11/15/2018    Dr Giovany Denny  [id number]    x3    [place]    [place]    OTHER SURGICAL HISTORY  11/15/2018    [facility] liver biopsy,extensive complex lysis of adhesions from pelvis and small bowel from omentum -Dr Lydia Luque N/A 9/19/2019    TOTAL THYROIDECTOMY performed by Tash Aranda MD at [address]  2009    [place]-Dr Dama Prader UPPER GASTROINTESTINAL ENDOSCOPY  11/15/2018    Dr Adriane Benson     Current Facility-Administered Medications:     calcium replacement protocol, , Other, RX Placeholder, Kristal Hutchins MD    Current Outpatient Medications:     calcitRIOL (ROCALTROL) 0.5 MCG capsule, Take 2 capsules by mouth 4 times daily, Disp: 240 capsule, Rfl: 0    ergocalciferol (ERGOCALCIFEROL) 1.25 MG (04408 UT) capsule, Take 1 capsule by mouth once a week, Disp: 4 capsule, Rfl: 0    busPIRone (BUSPAR) 7.5 MG tablet, Take 7.5 mg by mouth as needed, Disp: , Rfl:     calcium citrate (CALCITRATE) 250 MG TABS tablet, Take 4 tablets by mouth 4 times daily, Disp: 480 tablet, Rfl: 0    magnesium oxide (MAG-OX) 400 (241.3 Mg) MG TABS tablet, Take 1 tablet by mouth daily for 7 days, Disp: 7 tablet, Rfl: 0    Loratadine-Pseudoephedrine (CLARITIN-D 24 HOUR PO), Take 1 tablet by mouth daily, Disp: , Rfl:     levothyroxine (SYNTHROID) 200 MCG tablet, Take 200 mcg by mouth Daily, Disp: , Rfl:     ondansetron (ZOFRAN ODT) 4 MG disintegrating tablet, Take 1 tablet by mouth every 8 hours as needed for Nausea, Disp: 20 tablet, Rfl: 0    vitamin B-12 (CYANOCOBALAMIN) 500 MCG tablet, Take 1 tablet by mouth daily, Disp: , Rfl: 6    spironolactone (ALDACTONE) 50 MG tablet, Take 1 tablet by mouth as needed , Disp: , Rfl: 6    furosemide (LASIX) 40 MG tablet, Take 1 tablet by mouth as needed , Disp: , Rfl: 6    estradiol (ESTRACE) 2 MG tablet, Take 1 tablet by mouth daily, Disp: , Rfl: 6    Multiple Vitamin (MULTI VITAMIN DAILY PO), Take by mouth Indications: fljaytones, Disp: , Rfl:       SOCIAL HISTORY     Social History     Social History Narrative    Not on file     Social History     Tobacco Use    Smoking status: Current Every Day Smoker     Packs/day: 1.00    Smokeless tobacco: Never Used   Vaping Use    Vaping Use: Some days    Substances: Occasionally   Substance Use Topics    Alcohol use: Yes     Comment: social holidays     Drug use: Never         ALLERGIES     Allergies   Allergen Reactions    Latex Itching and Rash         FAMILY HISTORY     Family History   Problem Relation Age of Onset    Other Mother         gallbladder disease    Diabetes Father     Hypertension Father     Arthritis Father     Diabetes Paternal Grandfather     Cancer Paternal Grandmother     Obesity Maternal Grandmother     Diabetes Maternal Grandfather          PREVIOUS RECORDS   Previous records reviewed: reviewed and relevant per HPI, otherwise noncontributory. PHYSICAL EXAM     ED Triage Vitals [07/18/21 2242]   BP Temp Temp src Pulse Resp SpO2 Height Weight   123/84 97.9 °F (36.6 °C) -- 79 18 98 % 5' 3\" (1.6 m) 276 lb (125.2 kg)     Initial vital signs and nursing assessment reviewed and normal. Pulsoximetry is normal per my interpretation.     Additional Vital Signs:  Vitals:    07/19/21 0157 BP: (!) 117/90   Pulse: 84   Resp: 16   Temp:    SpO2: 97%       Physical Exam     Physical Exam  Vitals and nursing note reviewed. Constitutional:       General: She is not in acute distress. Appearance: She is well-developed. She is not diaphoretic. HENT:      Head: Normocephalic and atraumatic. Right Ear: External ear normal.      Left Ear: External ear normal.      Nose: Nose normal.      Mouth/Throat:      Pharynx: No oropharyngeal exudate. Eyes:      General: No scleral icterus. Right eye: No discharge. Left eye: No discharge. Conjunctiva/sclera: Conjunctivae normal.      Pupils: Pupils are equal, round, and reactive to light. Neck:      Thyroid: No thyromegaly. Vascular: No JVD. Trachea: No tracheal deviation. Cardiovascular:      Rate and Rhythm: Normal rate and regular rhythm. Heart sounds: Normal heart sounds, S1 normal and S2 normal. No murmur heard. No friction rub. No gallop. Pulmonary:      Effort: Pulmonary effort is normal.      Breath sounds: Normal breath sounds. No stridor. No wheezing, rhonchi or rales. Chest:      Chest wall: No tenderness. Abdominal:      General: Bowel sounds are normal. There is no distension. Palpations: Abdomen is soft. There is no mass. Tenderness: There is no abdominal tenderness. There is no guarding or rebound. Hernia: No hernia is present. Musculoskeletal:         General: No tenderness. Normal range of motion. Cervical back: Normal range of motion and neck supple. Lymphadenopathy:      Cervical: No cervical adenopathy. Skin:     General: Skin is warm and dry. Capillary Refill: Capillary refill takes less than 2 seconds. Findings: No bruising, ecchymosis, lesion or rash. Neurological:      Mental Status: She is alert and oriented to person, place, and time. GCS: GCS eye subscore is 4. GCS verbal subscore is 5. GCS motor subscore is 6.       Cranial Nerves: Cranial nerves are intact. No cranial nerve deficit. Sensory: Sensation is intact. Motor: Motor function is intact. Coordination: Coordination is intact. Coordination normal.      Gait: Gait is intact. Deep Tendon Reflexes:      Reflex Scores:       Tricep reflexes are 3+ on the right side and 3+ on the left side. Bicep reflexes are 3+ on the right side and 3+ on the left side. Brachioradialis reflexes are 3+ on the right side and 3+ on the left side. Patellar reflexes are 3+ on the right side and 3+ on the left side. Achilles reflexes are 3+ on the right side and 3+ on the left side. Comments: Patient is slightly hyperreflexic, she Chvostek's and Trousseau sign were negative. Psychiatric:         Speech: Speech normal.         Behavior: Behavior normal.         Thought Content: Thought content normal.         Judgment: Judgment normal.     EKG shows a low voltage, normal axis, ventricular rate of 79 DE interval of 156 QRS duration 54 QT of 346 QTC of 396. MEDICAL DECISION MAKING   Initial Assessment: Patient presented for hypocalcemia. Was in stable condition. Her symptoms, similar to previous visit are likely 2/2 Hypocalcemia due to hypoparathyroidism status post surgical.    Plan: Ionized calcium with likely replacement and also CBC, CMP, vitamin D PTH, Mag and phos and then reassess.         ED RESULTS   Laboratory results:  Labs Reviewed   CALCIUM, IONIZED - Abnormal; Notable for the following components:       Result Value    Calcium, Ion 0.64 (*)     All other components within normal limits   CBC WITH AUTO DIFFERENTIAL - Abnormal; Notable for the following components:    MCHC 31.8 (*)     RDW-CV 15.5 (*)     RDW-SD 52.3 (*)     MPV 12.7 (*)     Monocytes Absolute 0.3 (*)     All other components within normal limits   COMPREHENSIVE METABOLIC PANEL W/ REFLEX TO MG FOR LOW K - Abnormal; Notable for the following components:    CREATININE 1.3 (*)     Calcium 6.1 (*) All other components within normal limits   PTH, INTACT - Abnormal; Notable for the following components:    Pth Intact 9.8 (*)     All other components within normal limits   VITAMIN D 25 HYDROXY - Abnormal; Notable for the following components:    Vit D, 25-Hydroxy 15 (*)     All other components within normal limits   PHOSPHORUS - Abnormal; Notable for the following components:    Phosphorus 6.5 (*)     All other components within normal limits   GLOMERULAR FILTRATION RATE, ESTIMATED - Abnormal; Notable for the following components:    Est, Glom Filt Rate 45 (*)     All other components within normal limits   MAGNESIUM   HCG, SERUM, QUALITATIVE   ANION GAP   OSMOLALITY   URINE RT REFLEX TO CULTURE       Radiologic studies results:  No orders to display       ED Medications administered this visit:   Medications   calcium replacement protocol ( Other Canceled Entry 7/19/21 0158)   calcium gluconate 2,000 mg in dextrose 5 % 100 mL IVPB (2,000 mg Intravenous New Bag 7/19/21 0045)         ED COURSE          Patient was assessed at bedside appropriate labs and imaging were ordered. Patient was neurologically intact here. Hemodynamically stable. Patient's calcium and ionized calcium are both low. Patient was given 2 g of calcium here. I went back and reassessed the patient. She is doing much better. At this point for the patient be safely discharged home.       Patient has what appears to be hypercalcemia and hyperparathyroidism after surgery. Patient is instructed to take all current medications as prescribed. She is instructed to follow-up with her primary care physician to do so within the next 1 to 2 days. She is instructed to return to the nearest emergency room immediately for any new or worsening complaints.       MEDICATION CHANGES     New Prescriptions    No medications on file         FINAL DISPOSITION     Final diagnoses:   Hypocalcemia     Condition: condition: fair  Dispo: Discharge to home      This transcription was electronically signed. Parts of this transcriptions may have been dictated by use of voice recognition software and electronically transcribed, and parts may have been transcribed with the assistance of an ED scribe. The transcription may contain errors not detected in proofreading. Please refer to my supervising physician's documentation if my documentation differs.     Electronically Signed: Ayala Feliciano MD, 07/19/21, 2:47 AM         Ayala Feliciano MD  Resident  07/19/21 7125

## 2021-07-19 NOTE — ED NOTES
Patient resting in bed in a fowlers position. Patient awaiting calcium gluconate to be done for discharge. Patient denies needs.       Samuel Antunez RN  07/19/21 4493

## 2021-08-06 ENCOUNTER — HOSPITAL ENCOUNTER (EMERGENCY)
Age: 41
Discharge: HOME | End: 2021-08-07
Attending: EMERGENCY MEDICINE
Payer: COMMERCIAL

## 2021-08-06 DIAGNOSIS — E83.51 HYPOCALCEMIA: Primary | ICD-10-CM

## 2021-08-06 LAB
BASOPHILS # BLD: 1.3 %
BASOPHILS ABSOLUTE: 0.1 THOU/MM3 (ref 0–0.1)
EOSINOPHIL # BLD: 2.5 %
EOSINOPHILS ABSOLUTE: 0.2 THOU/MM3 (ref 0–0.4)
ERYTHROCYTE [DISTWIDTH] IN BLOOD BY AUTOMATED COUNT: 15.6 % (ref 11.5–14.5)
ERYTHROCYTE [DISTWIDTH] IN BLOOD BY AUTOMATED COUNT: 53.2 FL (ref 35–45)
HCT VFR BLD CALC: 39.5 % (ref 37–47)
HEMOGLOBIN: 12.6 GM/DL (ref 12–16)
IMMATURE GRANS (ABS): 0.02 THOU/MM3 (ref 0–0.07)
IMMATURE GRANULOCYTES: 0.3 %
LYMPHOCYTES # BLD: 31.4 %
LYMPHOCYTES ABSOLUTE: 2 THOU/MM3 (ref 1–4.8)
MCH RBC QN AUTO: 29.9 PG (ref 26–33)
MCHC RBC AUTO-ENTMCNC: 31.9 GM/DL (ref 32.2–35.5)
MCV RBC AUTO: 93.6 FL (ref 81–99)
MONOCYTES # BLD: 6.1 %
MONOCYTES ABSOLUTE: 0.4 THOU/MM3 (ref 0.4–1.3)
NUCLEATED RED BLOOD CELLS: 0 /100 WBC
PLATELET # BLD: 167 THOU/MM3 (ref 130–400)
PMV BLD AUTO: 11.8 FL (ref 9.4–12.4)
RBC # BLD: 4.22 MILL/MM3 (ref 4.2–5.4)
SEG NEUTROPHILS: 58.4 %
SEGMENTED NEUTROPHILS ABSOLUTE COUNT: 3.7 THOU/MM3 (ref 1.8–7.7)
WBC # BLD: 6.4 THOU/MM3 (ref 4.8–10.8)

## 2021-08-06 PROCEDURE — 85025 COMPLETE CBC W/AUTO DIFF WBC: CPT

## 2021-08-06 PROCEDURE — 36415 COLL VENOUS BLD VENIPUNCTURE: CPT

## 2021-08-06 PROCEDURE — 99284 EMERGENCY DEPT VISIT MOD MDM: CPT

## 2021-08-06 PROCEDURE — 80048 BASIC METABOLIC PNL TOTAL CA: CPT

## 2021-08-07 VITALS
HEART RATE: 62 BPM | DIASTOLIC BLOOD PRESSURE: 89 MMHG | OXYGEN SATURATION: 99 % | SYSTOLIC BLOOD PRESSURE: 121 MMHG | TEMPERATURE: 98.9 F | RESPIRATION RATE: 16 BRPM

## 2021-08-07 LAB
ANION GAP SERPL CALCULATED.3IONS-SCNC: 12 MEQ/L (ref 8–16)
BUN BLDV-MCNC: 21 MG/DL (ref 7–22)
CALCIUM IONIZED: 0.68 MMOL/L (ref 1.12–1.32)
CALCIUM SERPL-MCNC: 6.2 MG/DL (ref 8.5–10.5)
CHLORIDE BLD-SCNC: 101 MEQ/L (ref 98–111)
CO2: 29 MEQ/L (ref 23–33)
CREAT SERPL-MCNC: 1.2 MG/DL (ref 0.4–1.2)
GFR SERPL CREATININE-BSD FRML MDRD: 50 ML/MIN/1.73M2
GLUCOSE BLD-MCNC: 92 MG/DL (ref 70–108)
OSMOLALITY CALCULATION: 285.7 MOSMOL/KG (ref 275–300)
POTASSIUM SERPL-SCNC: 4.2 MEQ/L (ref 3.5–5.2)
SODIUM BLD-SCNC: 142 MEQ/L (ref 135–145)

## 2021-08-07 PROCEDURE — 82330 ASSAY OF CALCIUM: CPT

## 2021-08-07 PROCEDURE — 6360000002 HC RX W HCPCS: Performed by: EMERGENCY MEDICINE

## 2021-08-07 PROCEDURE — 96365 THER/PROPH/DIAG IV INF INIT: CPT

## 2021-08-07 PROCEDURE — 96366 THER/PROPH/DIAG IV INF ADDON: CPT

## 2021-08-07 PROCEDURE — 36415 COLL VENOUS BLD VENIPUNCTURE: CPT

## 2021-08-07 PROCEDURE — 2580000003 HC RX 258: Performed by: EMERGENCY MEDICINE

## 2021-08-07 RX ADMIN — CALCIUM GLUCONATE 4000 MG: 98 INJECTION, SOLUTION INTRAVENOUS at 00:50

## 2021-08-07 ASSESSMENT — ENCOUNTER SYMPTOMS
PHOTOPHOBIA: 0
ABDOMINAL PAIN: 0
EYE PAIN: 0
COUGH: 0
ABDOMINAL DISTENTION: 0
STRIDOR: 0
BACK PAIN: 0
CONSTIPATION: 0
EYE ITCHING: 0
RHINORRHEA: 0
EYE DISCHARGE: 0
CHEST TIGHTNESS: 0
SHORTNESS OF BREATH: 0
WHEEZING: 0
DIARRHEA: 0
EYE REDNESS: 0
NAUSEA: 0
VOMITING: 0
SORE THROAT: 0

## 2021-08-07 NOTE — ED TRIAGE NOTES
Pt presents to the ED for low calcium x2 days. Pt states that her finger are \"locking\". Pt states she has intermittent tingling in her fingers. Pt states she only needs and infusion.  Pt denies pain

## 2021-08-07 NOTE — ED PROVIDER NOTES
[address] ENCOUNTER      PATIENT NAME: Diane Stallworth  MRN: [id number]  : 1980  SUAREZ: 2021  PROVIDER: Chantel Davila MD      CHIEF COMPLAINT       Chief Complaint   Patient presents with    Other     low calcium       Patient is seen and evaluated in a timely fashion. Nurses Notes are reviewed and I agree except as noted in the HPI. HISTORY OF PRESENT ILLNESS    Diane Stallworth is a 36 y.o. female who presents to Emergency Department with Other (low calcium)     Patient presents with facial numbness tingling and intermittent jaw spasm over last 2 days. Past medical history remarkable for chronic hypocalcemia. She had total thyroidectomy in 2019. She requires occasionally IV calcium. This HPI was provided by patient. REVIEW OF SYSTEMS   Review of Systems   Constitutional: Negative for activity change, appetite change, chills, fatigue, fever and unexpected weight change. HENT: Negative for congestion, ear discharge, ear pain, hearing loss, nosebleeds, rhinorrhea and sore throat. Facial muscle spasm   Eyes: Negative for photophobia, pain, discharge, redness and itching. Respiratory: Negative for cough, chest tightness, shortness of breath, wheezing and stridor. Cardiovascular: Negative for chest pain, palpitations and leg swelling. Gastrointestinal: Negative for abdominal distention, abdominal pain, constipation, diarrhea, nausea and vomiting. Endocrine: Negative for cold intolerance, heat intolerance, polydipsia and polyphagia. Genitourinary: Negative for dysuria, flank pain, frequency and hematuria. Musculoskeletal: Negative for arthralgias, back pain, gait problem, myalgias, neck pain and neck stiffness. Skin: Negative for pallor, rash and wound. Allergic/Immunologic: Negative for environmental allergies and food allergies. Neurological: Positive for numbness.  Negative for dizziness, tremors, syncope, weakness and headaches. Psychiatric/Behavioral: Negative for agitation, behavioral problems, confusion, self-injury, sleep disturbance and suicidal ideas.         PAST MEDICAL HISTORY     Past Medical History:   Diagnosis Date    Adjustment disorder     Anxiety     GERD (gastroesophageal reflux disease)     Hypothyroidism     Obesity     Seasonal allergies     Thyroid nodule        SURGICAL HISTORY       Past Surgical History:   Procedure Laterality Date    BARIATRIC SURGERY  11/15/2018    Dr Jony Poole  [phone]    x3    [place]    [place]    OTHER SURGICAL HISTORY  11/15/2018    [facility] liver biopsy,extensive complex lysis of adhesions from pelvis and small bowel from omentum -Dr Johnathon Madrigal N/A 9/19/2019    [facility] 4[facility] [address]  2009    [place]-Dr Celia Meeks UPPER GASTROINTESTINAL ENDOSCOPY  11/15/2018    Dr Miles Master       Discharge Medication List as of 8/7/2021 12:51 AM      CONTINUE these medications which have NOT CHANGED    Details   calcitRIOL (ROCALTROL) 0.5 MCG capsule Take 2 capsules by mouth 4 times daily, Disp-240 capsule, R-0Print      ergocalciferol (ERGOCALCIFEROL) 1.25 MG (04334 UT) capsule Take 1 capsule by mouth once a week, Disp-4 capsule, R-0Print      busPIRone (BUSPAR) 7.5 MG tablet Take 7.5 mg by mouth as neededHistorical Med      calcium citrate (CALCITRATE) 250 MG TABS tablet Take 4 tablets by mouth 4 times daily, Disp-480 tablet,R-0Normal      magnesium oxide (MAG-OX) 400 (241.3 Mg) MG TABS tablet Take 1 tablet by mouth daily for 7 days, Disp-7 tablet,R-0Normal      Loratadine-Pseudoephedrine (CLARITIN-D 24 HOUR PO) Take 1 tablet by mouth dailyHistorical Med      levothyroxine (SYNTHROID) 200 MCG tablet Take 200 mcg by mouth DailyHistorical Med      ondansetron (ZOFRAN ODT) 4 MG disintegrating tablet Take 1 tablet by mouth every 8 hours as needed for Nausea, Disp-20 tablet, R-0Print      vitamin B-12 (CYANOCOBALAMIN) 500 MCG tablet Take 1 tablet by mouth daily, R-6Historical Med      spironolactone (ALDACTONE) 50 MG tablet Take 1 tablet by mouth as needed , R-6Historical Med      furosemide (LASIX) 40 MG tablet Take 1 tablet by mouth as needed , R-6Historical Med      estradiol (ESTRACE) 2 MG tablet Take 1 tablet by mouth daily, R-6Historical Med      Multiple Vitamin (MULTI VITAMIN DAILY PO) Take by mouth Indications: flinstonesHistorical Med             ALLERGIES     Latex    FAMILY HISTORY     She indicated that her mother is alive. She indicated that her father is alive. She indicated that her sister is alive. She indicated that her brother is alive. She indicated that her maternal grandmother is . She indicated that her maternal grandfather is . She indicated that her paternal grandmother is alive. She indicated that her paternal grandfather is . family history includes Arthritis in her father; Cancer in her paternal grandmother; Diabetes in her father, maternal grandfather, and paternal grandfather; Hypertension in her father; Obesity in her maternal grandmother; Other in her mother. SOCIAL HISTORY      reports that she has been smoking. She has been smoking about 1.00 pack per day. She has never used smokeless tobacco. She reports current alcohol use. She reports that she does not use drugs. PHYSICAL EXAM      oral temperature is 98.9 °F (37.2 °C). Her blood pressure is 121/89 and her pulse is 62. Her respiration is 16 and oxygen saturation is 99%. Physical Exam  Vitals and nursing note reviewed. Constitutional:       Appearance: She is well-developed. She is not diaphoretic. HENT:      Head: Normocephalic and atraumatic. Nose: Nose normal.   Eyes:      General: No scleral icterus. Right eye: No discharge.          Left eye: Hematocrit 39.5 37.0 - 47.0 %    MCV 93.6 81.0 - 99.0 fL    MCH 29.9 26.0 - 33.0 pg    MCHC 31.9 (L) 32.2 - 35.5 gm/dl    RDW-CV 15.6 (H) 11.5 - 14.5 %    RDW-SD 53.2 (H) 35.0 - 45.0 fL    Platelets 917 495 - 307 thou/mm3    MPV 11.8 9.4 - 12.4 fL    Seg Neutrophils 58.4 %    Lymphocytes 31.4 %    Monocytes 6.1 %    Eosinophils 2.5 %    Basophils 1.3 %    Immature Granulocytes 0.3 %    Segs Absolute 3.7 1 - 7 thou/mm3    Lymphocytes Absolute 2.0 1.0 - 4.8 thou/mm3    Monocytes Absolute 0.4 0.4 - 1.3 thou/mm3    Eosinophils Absolute 0.2 0.0 - 0.4 thou/mm3    Basophils Absolute 0.1 0.0 - 0.1 thou/mm3    Immature Grans (Abs) 0.02 0.00 - 0.07 thou/mm3    nRBC 0 /100 wbc   Basic Metabolic Panel   Result Value Ref Range    Sodium 142 135 - 145 meq/L    Potassium 4.2 3.5 - 5.2 meq/L    Chloride 101 98 - 111 meq/L    CO2 29 23 - 33 meq/L    Glucose 92 70 - 108 mg/dL    BUN 21 7 - 22 mg/dL    CREATININE 1.2 0.4 - 1.2 mg/dL    Calcium 6.2 (L) 8.5 - 10.5 mg/dL   Calcium, Ionized   Result Value Ref Range    Calcium, Ion 0.68 (LL) 1.12 - 1.32 mmol/L   Anion Gap   Result Value Ref Range    Anion Gap 12.0 8.0 - 16.0 meq/L   Glomerular Filtration Rate, Estimated   Result Value Ref Range    Est, Glom Filt Rate 50 (A) ml/min/1.73m2   Osmolality   Result Value Ref Range    Osmolality Calc 285.7 275.0 - 300.0 mOsmol/kg       RADIOLOGY REPORTS  No orders to display       [address] ED COURSE     ED Vitals:  Vitals:    08/07/21 0053 08/07/21 0200 08/07/21 0315 08/07/21 0424   BP: 107/85 100/67 110/83 121/89   Pulse: 87 83 71 62   Resp: 18 18 18 16   Temp:       TempSrc:       SpO2: 100% 94% 94% 99%       Differential diagnsis: Anxiety, hypocalcemia    Actions:   Labs including ionized calcium  IV calcium gluconate    She felt much better on reassessment after IV calcium. Discharged with PCP follow-up in 3 days.   Medications   calcium gluconate 4,000 mg in dextrose 5 % 100 mL IVPB (0 mg Intravenous Stopped 8/7/21 0522)

## 2021-08-07 NOTE — ED NOTES
Pt given medication per orders.  Pt denies any needs at this time     Subhash [facility]  08/07/21 6943

## 2021-08-26 ENCOUNTER — HOSPITAL ENCOUNTER (OUTPATIENT)
Age: 41
Setting detail: SPECIMEN
Discharge: HOME | End: 2021-08-26
Payer: COMMERCIAL

## 2021-08-26 LAB
ALBUMIN SERPL-MCNC: 4.6 G/DL (ref 3.5–5.2)
ALBUMIN/GLOBULIN RATIO: 1.7 (ref 1–2.5)
ALP BLD-CCNC: 99 U/L (ref 35–104)
ALT SERPL-CCNC: 18 U/L (ref 5–33)
ANION GAP SERPL CALCULATED.3IONS-SCNC: 18 MMOL/L (ref 9–17)
AST SERPL-CCNC: 27 U/L
BILIRUB SERPL-MCNC: 0.43 MG/DL (ref 0.3–1.2)
BUN BLDV-MCNC: 19 MG/DL (ref 6–20)
BUN/CREAT BLD: ABNORMAL (ref 9–20)
CALCIUM SERPL-MCNC: 6.3 MG/DL (ref 8.6–10.4)
CHLORIDE BLD-SCNC: 100 MMOL/L (ref 98–107)
CO2: 26 MMOL/L (ref 20–31)
CREAT SERPL-MCNC: 0.98 MG/DL (ref 0.5–0.9)
GFR AFRICAN AMERICAN: >60 ML/MIN
GFR NON-AFRICAN AMERICAN: >60 ML/MIN
GFR SERPL CREATININE-BSD FRML MDRD: ABNORMAL ML/MIN/{1.73_M2}
GFR SERPL CREATININE-BSD FRML MDRD: ABNORMAL ML/MIN/{1.73_M2}
GLUCOSE BLD-MCNC: 73 MG/DL (ref 70–99)
POTASSIUM SERPL-SCNC: 4.6 MMOL/L (ref 3.7–5.3)
SODIUM BLD-SCNC: 144 MMOL/L (ref 135–144)
THYROXINE, FREE: 0.41 NG/DL (ref 0.93–1.7)
TOTAL PROTEIN: 7.3 G/DL (ref 6.4–8.3)
TSH SERPL DL<=0.05 MIU/L-ACNC: 66.61 MIU/L (ref 0.3–5)

## 2021-08-27 ENCOUNTER — HOSPITAL ENCOUNTER (EMERGENCY)
Age: 41
Discharge: HOME | End: 2021-08-28
Payer: COMMERCIAL

## 2021-08-27 DIAGNOSIS — E83.51 HYPOCALCEMIA: Primary | ICD-10-CM

## 2021-08-27 LAB
ALBUMIN SERPL-MCNC: 4.5 G/DL (ref 3.5–5.1)
ALP BLD-CCNC: 106 U/L (ref 38–126)
ALT SERPL-CCNC: 17 U/L (ref 11–66)
ANION GAP SERPL CALCULATED.3IONS-SCNC: 17 MEQ/L (ref 8–16)
AST SERPL-CCNC: 24 U/L (ref 5–40)
BASOPHILS # BLD: 0.8 %
BASOPHILS ABSOLUTE: 0.1 THOU/MM3 (ref 0–0.1)
BILIRUB SERPL-MCNC: 0.3 MG/DL (ref 0.3–1.2)
BUN BLDV-MCNC: 19 MG/DL (ref 7–22)
CALCIUM IONIZED: 0.67 MMOL/L (ref 1.12–1.32)
CALCIUM SERPL-MCNC: 6.2 MG/DL (ref 8.5–10.5)
CHLORIDE BLD-SCNC: 97 MEQ/L (ref 98–111)
CO2: 26 MEQ/L (ref 23–33)
CREAT SERPL-MCNC: 1.1 MG/DL (ref 0.4–1.2)
EOSINOPHIL # BLD: 3 %
EOSINOPHILS ABSOLUTE: 0.2 THOU/MM3 (ref 0–0.4)
ERYTHROCYTE [DISTWIDTH] IN BLOOD BY AUTOMATED COUNT: 14.6 % (ref 11.5–14.5)
ERYTHROCYTE [DISTWIDTH] IN BLOOD BY AUTOMATED COUNT: 50.9 FL (ref 35–45)
GFR SERPL CREATININE-BSD FRML MDRD: 55 ML/MIN/1.73M2
GLUCOSE BLD-MCNC: 109 MG/DL (ref 70–108)
HCT VFR BLD CALC: 40.7 % (ref 37–47)
HEMOGLOBIN: 13 GM/DL (ref 12–16)
IMMATURE GRANS (ABS): 0.01 THOU/MM3 (ref 0–0.07)
IMMATURE GRANULOCYTES: 0.2 %
LYMPHOCYTES # BLD: 27.6 %
LYMPHOCYTES ABSOLUTE: 1.7 THOU/MM3 (ref 1–4.8)
MAGNESIUM: 1.9 MG/DL (ref 1.6–2.4)
MCH RBC QN AUTO: 30.2 PG (ref 26–33)
MCHC RBC AUTO-ENTMCNC: 31.9 GM/DL (ref 32.2–35.5)
MCV RBC AUTO: 94.4 FL (ref 81–99)
MONOCYTES # BLD: 6.2 %
MONOCYTES ABSOLUTE: 0.4 THOU/MM3 (ref 0.4–1.3)
NUCLEATED RED BLOOD CELLS: 0 /100 WBC
OSMOLALITY CALCULATION: 282.2 MOSMOL/KG (ref 275–300)
PLATELET # BLD: 196 THOU/MM3 (ref 130–400)
PMV BLD AUTO: 11 FL (ref 9.4–12.4)
POTASSIUM SERPL-SCNC: 4 MEQ/L (ref 3.5–5.2)
RBC # BLD: 4.31 MILL/MM3 (ref 4.2–5.4)
SEG NEUTROPHILS: 62.2 %
SEGMENTED NEUTROPHILS ABSOLUTE COUNT: 3.9 THOU/MM3 (ref 1.8–7.7)
SODIUM BLD-SCNC: 140 MEQ/L (ref 135–145)
T4 FREE: 0.71 NG/DL (ref 0.93–1.76)
TOTAL PROTEIN: 7.3 G/DL (ref 6.1–8)
TSH SERPL DL<=0.05 MIU/L-ACNC: 71.75 UIU/ML (ref 0.4–4.2)
WBC # BLD: 6.3 THOU/MM3 (ref 4.8–10.8)

## 2021-08-27 PROCEDURE — 82330 ASSAY OF CALCIUM: CPT

## 2021-08-27 PROCEDURE — 36415 COLL VENOUS BLD VENIPUNCTURE: CPT

## 2021-08-27 PROCEDURE — 6360000002 HC RX W HCPCS: Performed by: PHYSICIAN ASSISTANT

## 2021-08-27 PROCEDURE — 84439 ASSAY OF FREE THYROXINE: CPT

## 2021-08-27 PROCEDURE — 96366 THER/PROPH/DIAG IV INF ADDON: CPT

## 2021-08-27 PROCEDURE — 93005 ELECTROCARDIOGRAM TRACING: CPT | Performed by: PHYSICIAN ASSISTANT

## 2021-08-27 PROCEDURE — 85025 COMPLETE CBC W/AUTO DIFF WBC: CPT

## 2021-08-27 PROCEDURE — 96365 THER/PROPH/DIAG IV INF INIT: CPT

## 2021-08-27 PROCEDURE — 2580000003 HC RX 258: Performed by: PHYSICIAN ASSISTANT

## 2021-08-27 PROCEDURE — 80053 COMPREHEN METABOLIC PANEL: CPT

## 2021-08-27 PROCEDURE — 84443 ASSAY THYROID STIM HORMONE: CPT

## 2021-08-27 PROCEDURE — 99284 EMERGENCY DEPT VISIT MOD MDM: CPT

## 2021-08-27 PROCEDURE — 83735 ASSAY OF MAGNESIUM: CPT

## 2021-08-27 RX ADMIN — CALCIUM GLUCONATE 2000 MG: 98 INJECTION, SOLUTION INTRAVENOUS at 22:03

## 2021-08-27 ASSESSMENT — ENCOUNTER SYMPTOMS
SORE THROAT: 0
SINUS PRESSURE: 0
COUGH: 0
ABDOMINAL PAIN: 0
DIARRHEA: 0
PHOTOPHOBIA: 0
SHORTNESS OF BREATH: 0
EYE PAIN: 0
VOMITING: 0
NAUSEA: 0
RHINORRHEA: 0

## 2021-08-27 NOTE — ED TRIAGE NOTES
Pt comes through [facility]. She states last night she began to have facial numbness and bilateral hand/arm numbness. She has a history of hypocalcemia, and has this sensation when her calcium is low.

## 2021-08-27 NOTE — ED NOTES
Upon first contact with patient this RN receives bedside shift report from [facility]. Pt sitting in bed. Marylene Spice in to update pt on poc.  Will monitor      Jenna Warren RN  08/27/21 1916

## 2021-08-27 NOTE — ED PROVIDER NOTES
[facility] EMERGENCY DEPT      CHIEF COMPLAINT       Chief Complaint   Patient presents with    Numbness       Nurses Notes reviewed and I agree except as noted in the HPI. HISTORY OF PRESENT ILLNESS    Cristiano Rao is a 36 y.o. female who presents for concern for hypocalcemia. Patient reports a year and a half ago after having a thyroidectomy, her parathyroid got \"knicked. \"  Since then she has had problem with hypocalcemia. Patient takes Mendel-Citrate 1600 mg daily along with calcitriol 8 mcg. Since yesterday she has been having symptoms of heaviness as if someone is pushing down on her. She is having \"charley horses\" and her fingers and toes are locking up. There is paresthesia in left side of her face and lips and she is slurring her speech. Patient reports all the symptoms are consistent with prior episodes of hypocalcemia. Patient informs me she has no history of stroke or heart disease. Patient informs me that she typically receives 2 g of calcium in 2 hours or in severe cases 4 g in 4 hours. Patient denies chest pain, dyspnea, dizziness, vision changes, headache, confusion, or other complaints. Patient drove herself to the ER today after work. Patient also has history of gastric bypass and hysterectomy. REVIEW OF SYSTEMS     Review of Systems   Constitutional: Negative for chills and fever. HENT: Negative for congestion, ear pain, rhinorrhea, sinus pressure and sore throat. Eyes: Negative for photophobia, pain and visual disturbance. Respiratory: Negative for cough and shortness of breath. Cardiovascular: Negative for chest pain. Gastrointestinal: Negative for abdominal pain, diarrhea, nausea and vomiting. Endocrine: Negative for polyuria. Genitourinary: Negative for difficulty urinating and frequency. Musculoskeletal: Positive for myalgias. Negative for gait problem, neck pain and neck stiffness. Skin: Negative for rash.    Neurological: Positive for speech difficulty (slurred) and numbness. Negative for dizziness, facial asymmetry, weakness, light-headedness and headaches. Psychiatric/Behavioral: Negative for confusion and sleep disturbance. PAST MEDICAL HISTORY    has a past medical history of Adjustment disorder, Anxiety, GERD (gastroesophageal reflux disease), Hypothyroidism, Obesity, Seasonal allergies, and Thyroid nodule. SURGICAL HISTORY      has a past surgical history that includes  section (,,); Cholecystectomy (); Hysterectomy (); Tubal ligation (); Bariatric Surgery (11/15/2018); Upper gastrointestinal endoscopy (11/15/2018); other surgical history (11/15/2018); Tonsillectomy; and Thyroidectomy (N/A, 2019).     CURRENT MEDICATIONS       Discharge Medication List as of 2021 12:43 AM      CONTINUE these medications which have NOT CHANGED    Details   calcitRIOL (ROCALTROL) 0.5 MCG capsule Take 2 capsules by mouth 4 times daily, Disp-240 capsule, R-0Print      ergocalciferol (ERGOCALCIFEROL) 1.25 MG (18880 UT) capsule Take 1 capsule by mouth once a week, Disp-4 capsule, R-0Print      busPIRone (BUSPAR) 7.5 MG tablet Take 7.5 mg by mouth as neededHistorical Med      calcium citrate (CALCITRATE) 250 MG TABS tablet Take 4 tablets by mouth 4 times daily, Disp-480 tablet,R-0Normal      magnesium oxide (MAG-OX) 400 (241.3 Mg) MG TABS tablet Take 1 tablet by mouth daily for 7 days, Disp-7 tablet,R-0Normal      Loratadine-Pseudoephedrine (CLARITIN-D 24 HOUR PO) Take 1 tablet by mouth dailyHistorical Med      levothyroxine (SYNTHROID) 200 MCG tablet Take 200 mcg by mouth DailyHistorical Med      ondansetron (ZOFRAN ODT) 4 MG disintegrating tablet Take 1 tablet by mouth every 8 hours as needed for Nausea, Disp-20 tablet, R-0Print      vitamin B-12 (CYANOCOBALAMIN) 500 MCG tablet Take 1 tablet by mouth daily, R-6Historical Med      spironolactone (ALDACTONE) 50 MG tablet Take 1 tablet by mouth as needed , R-6Historical Med      furosemide (LASIX) 40 MG tablet Take 1 tablet by mouth as needed , R-6Historical Med      estradiol (ESTRACE) 2 MG tablet Take 1 tablet by mouth daily, R-6Historical Med      Multiple Vitamin (MULTI VITAMIN DAILY PO) Take by mouth Indications: flinstonesHistorical Med             ALLERGIES     is allergic to latex. FAMILY HISTORY     She indicated that her mother is alive. She indicated that her father is alive. She indicated that her sister is alive. She indicated that her brother is alive. She indicated that her maternal grandmother is . She indicated that her maternal grandfather is . She indicated that her paternal grandmother is alive. She indicated that her paternal grandfather is . family history includes Arthritis in her father; Cancer in her paternal grandmother; Diabetes in her father, maternal grandfather, and paternal grandfather; Hypertension in her father; Obesity in her maternal grandmother; Other in her mother. SOCIAL HISTORY    reports that she has been smoking. She has been smoking about 1.00 pack per day. She has never used smokeless tobacco. She reports current alcohol use. She reports that she does not use drugs. PHYSICAL EXAM     INITIAL VITALS:  height is 5' 3\" (1.6 m) and weight is 276 lb (125.2 kg). Her oral temperature is 98 °F (36.7 °C). Her blood pressure is 107/75 and her pulse is 60. Her respiration is 16 and oxygen saturation is 95%. Physical Exam  Vitals and nursing note reviewed. Constitutional:       General: She is not in acute distress. Appearance: Normal appearance. She is well-developed. She is not toxic-appearing. HENT:      Head: Normocephalic and atraumatic. Right Ear: Hearing, tympanic membrane and ear canal normal. No hemotympanum. Left Ear: Hearing, tympanic membrane and ear canal normal. No hemotympanum. Nose: Nose normal.      Mouth/Throat:      Pharynx: Uvula midline.    Eyes:      General: Lids are normal.      Conjunctiva/sclera: Conjunctivae normal.      Pupils: Pupils are equal, round, and reactive to light. Neck:      Vascular: No JVD. Trachea: Trachea normal. No tracheal deviation. Cardiovascular:      Rate and Rhythm: Normal rate and regular rhythm. Heart sounds: Normal heart sounds. No murmur heard. Pulmonary:      Effort: Pulmonary effort is normal.      Breath sounds: Normal breath sounds. Abdominal:      General: There is no distension. Palpations: Abdomen is soft. Abdomen is not rigid. Tenderness: There is no abdominal tenderness. There is no guarding. Musculoskeletal:         General: Normal range of motion. Cervical back: Normal range of motion and neck supple. No rigidity. Comments: Extremities well perfused; good strength appreciated in all muscle groups. No signs of DVT. Lymphadenopathy:      Cervical: No cervical adenopathy. Skin:     General: Skin is warm and dry. Findings: No rash. Neurological:      Mental Status: She is alert and oriented to person, place, and time. GCS: GCS eye subscore is 4. GCS verbal subscore is 5. GCS motor subscore is 6. Cranial Nerves: No cranial nerve deficit. Sensory: No sensory deficit. Comments: Cranial nerves II through XII are normal as tested. Cerebellar tests are normal as tested. Psychiatric:         Speech: Speech normal.         Behavior: Behavior normal. Behavior is cooperative. Thought Content:  Thought content normal.         Judgment: Judgment normal.         DIFFERENTIAL DIAGNOSIS:   Including but not limited to: Hypocalcemia, anxiety syndrome, less likely as clinically not present but considered CVA, TIA    DIAGNOSTIC RESULTS     EKG: All EKG's are interpreted by [facility] who either signs or Co-signs this chart in the absence of a cardiologist.  Ventricular Rate BPM 70    Atrial Rate BPM 70    P-R Interval ms 160    QRS Duration ms 74    Q-T 60   Resp: 16 14  16   Temp:       TempSrc:       SpO2: 95% 96%  95%   Weight:       Height:         MDM:  The patient was seen and evaluated within the ED today for hypocalcemia. On exam, I appreciated no acute findings. The patient was neurologically intact. Old records were reviewed. Within the department, I observed the patient's vital signs to be within acceptable range. Laboratory work revealed calcium of 6.2 ionized calcium 0.67. Within the department the patient was treated with 2 g IV calcium gluconate. I observed the patient's condition to modestly improve during the duration of their stay. On re-exam patient felt improved and was getting her things together. Vital signs remained stable. The patient remained non-toxic appearing with no distress evident in the ER. I discussed with the patient rechecking her calcium or possibly getting another 2 g IV. The patient declined. She wanted no further testing or treatment and wanted discharge. The patient was comfortable with the plan of discharge home and to follow up with her provider. Anticipatory guidance was given. The patient was instructed to return to the emergency department for any worsening of their symptoms. Patient was discharged from the emergency department in good condition with all questions answered. See disposition below. I have given the patient strict written and verbal instructions about care at home, follow-up, and signs and symptoms of worsening of condition and they did verbalize understanding. CRITICAL CARE:   None    CONSULTS:  None    PROCEDURES:  None    FINAL IMPRESSION      1. Hypocalcemia          DISPOSITION/PLAN     1.  Hypocalcemia        PATIENT REFERRED TO:  EBEN Hopson CNP 38  [phone]    Schedule an appointment as soon as possible for a visit         DISCHARGE MEDICATIONS:  Discharge Medication List as of 8/28/2021 12:43 AM          (Please note that portions of this note were completed with a voice recognition program.  Efforts were made to edit the dictations but occasionally words are mis-transcribed.)    Macie Dyer PA-C 08/31/21 6:11 AM    SURJIT Mccain PA-C  08/31/21 8027

## 2021-08-27 NOTE — ED NOTES
ED nurse-to-nurse bedside report    Chief Complaint   Patient presents with    Numbness      LOC: alert and orientated to name, place, date  Vital signs   Vitals:    08/27/21 1824   BP: 109/75   Pulse: 80   Resp: 18   Temp: 98 °F (36.7 °C)   TempSrc: Oral   SpO2: 98%   Weight: 276 lb (125.2 kg)   Height: 5' 3\" (1.6 m)      Pain:    Pain Interventions: N/A  Pain Goal: N/A  Oxygen: No    Current needs required Room Air   Telemetry: No  LDAs:    Continuous Infusions:   Mobility: Independent  Myra Fall Risk Score: No flowsheet data found.   Fall Interventions: Hourly Rounding  Report given to: Laci Connolly RN  08/27/21 9328

## 2021-08-28 VITALS
HEART RATE: 60 BPM | HEIGHT: 63 IN | BODY MASS INDEX: 48.9 KG/M2 | TEMPERATURE: 98 F | SYSTOLIC BLOOD PRESSURE: 107 MMHG | OXYGEN SATURATION: 95 % | WEIGHT: 276 LBS | RESPIRATION RATE: 16 BRPM | DIASTOLIC BLOOD PRESSURE: 75 MMHG

## 2021-08-28 LAB
EKG ATRIAL RATE: 70 BPM
EKG P AXIS: 57 DEGREES
EKG P-R INTERVAL: 160 MS
EKG Q-T INTERVAL: 452 MS
EKG QRS DURATION: 74 MS
EKG QTC CALCULATION (BAZETT): 488 MS
EKG R AXIS: 59 DEGREES
EKG T AXIS: 3 DEGREES
EKG VENTRICULAR RATE: 70 BPM

## 2021-08-28 PROCEDURE — 93010 ELECTROCARDIOGRAM REPORT: CPT | Performed by: INTERNAL MEDICINE

## 2021-08-28 NOTE — ED NOTES
Pt resting in bed. On phone talking with . Denies any needs.  Will monitor      Jane Hoover RN  08/27/21 7735

## 2021-08-28 NOTE — ED NOTES
Yusuf in to speak with pt. Pt declined redraw. Pt reports \"im ready to go home.       Teresa Sarah RN  08/28/21 9388

## 2021-09-06 ENCOUNTER — HOSPITAL ENCOUNTER (EMERGENCY)
Age: 41
Discharge: HOME | End: 2021-09-07
Attending: EMERGENCY MEDICINE
Payer: COMMERCIAL

## 2021-09-06 DIAGNOSIS — E89.2 HYPOPARATHYROIDISM AFTER PROCEDURE (HCC): ICD-10-CM

## 2021-09-06 DIAGNOSIS — E83.51 HYPOCALCEMIA: Primary | ICD-10-CM

## 2021-09-06 DIAGNOSIS — R21 RASH OF HAND: ICD-10-CM

## 2021-09-06 LAB
ALBUMIN SERPL-MCNC: 4.4 G/DL (ref 3.5–5.1)
ALP BLD-CCNC: 123 U/L (ref 38–126)
ALT SERPL-CCNC: 19 U/L (ref 11–66)
ANION GAP SERPL CALCULATED.3IONS-SCNC: 15 MEQ/L (ref 8–16)
AST SERPL-CCNC: 30 U/L (ref 5–40)
BASOPHILS # BLD: 1 %
BASOPHILS ABSOLUTE: 0.1 THOU/MM3 (ref 0–0.1)
BILIRUB SERPL-MCNC: 0.3 MG/DL (ref 0.3–1.2)
BUN BLDV-MCNC: 20 MG/DL (ref 7–22)
CALCIUM IONIZED: 0.66 MMOL/L (ref 1.12–1.32)
CALCIUM SERPL-MCNC: 5.9 MG/DL (ref 8.5–10.5)
CHLORIDE BLD-SCNC: 99 MEQ/L (ref 98–111)
CO2: 26 MEQ/L (ref 23–33)
CREAT SERPL-MCNC: 1 MG/DL (ref 0.4–1.2)
EOSINOPHIL # BLD: 3.5 %
EOSINOPHILS ABSOLUTE: 0.2 THOU/MM3 (ref 0–0.4)
ERYTHROCYTE [DISTWIDTH] IN BLOOD BY AUTOMATED COUNT: 14.4 % (ref 11.5–14.5)
ERYTHROCYTE [DISTWIDTH] IN BLOOD BY AUTOMATED COUNT: 48.4 FL (ref 35–45)
GFR SERPL CREATININE-BSD FRML MDRD: 61 ML/MIN/1.73M2
GLUCOSE BLD-MCNC: 86 MG/DL (ref 70–108)
HCT VFR BLD CALC: 41.5 % (ref 37–47)
HEMOGLOBIN: 13.3 GM/DL (ref 12–16)
IMMATURE GRANS (ABS): 0.04 THOU/MM3 (ref 0–0.07)
IMMATURE GRANULOCYTES: 0.6 %
LYMPHOCYTES # BLD: 26.6 %
LYMPHOCYTES ABSOLUTE: 1.9 THOU/MM3 (ref 1–4.8)
MCH RBC QN AUTO: 29.8 PG (ref 26–33)
MCHC RBC AUTO-ENTMCNC: 32 GM/DL (ref 32.2–35.5)
MCV RBC AUTO: 93 FL (ref 81–99)
MONOCYTES # BLD: 5.9 %
MONOCYTES ABSOLUTE: 0.4 THOU/MM3 (ref 0.4–1.3)
NUCLEATED RED BLOOD CELLS: 0 /100 WBC
OSMOLALITY CALCULATION: 281.3 MOSMOL/KG (ref 275–300)
PLATELET # BLD: 200 THOU/MM3 (ref 130–400)
PMV BLD AUTO: 11.7 FL (ref 9.4–12.4)
POTASSIUM REFLEX MAGNESIUM: 5 MEQ/L (ref 3.5–5.2)
RBC # BLD: 4.46 MILL/MM3 (ref 4.2–5.4)
SEG NEUTROPHILS: 62.4 %
SEGMENTED NEUTROPHILS ABSOLUTE COUNT: 4.4 THOU/MM3 (ref 1.8–7.7)
SODIUM BLD-SCNC: 140 MEQ/L (ref 135–145)
TOTAL PROTEIN: 7 G/DL (ref 6.1–8)
WBC # BLD: 7.1 THOU/MM3 (ref 4.8–10.8)

## 2021-09-06 PROCEDURE — 36415 COLL VENOUS BLD VENIPUNCTURE: CPT

## 2021-09-06 PROCEDURE — 85025 COMPLETE CBC W/AUTO DIFF WBC: CPT

## 2021-09-06 PROCEDURE — 96366 THER/PROPH/DIAG IV INF ADDON: CPT

## 2021-09-06 PROCEDURE — 2580000003 HC RX 258: Performed by: EMERGENCY MEDICINE

## 2021-09-06 PROCEDURE — 99283 EMERGENCY DEPT VISIT LOW MDM: CPT

## 2021-09-06 PROCEDURE — 93005 ELECTROCARDIOGRAM TRACING: CPT | Performed by: EMERGENCY MEDICINE

## 2021-09-06 PROCEDURE — 82330 ASSAY OF CALCIUM: CPT

## 2021-09-06 PROCEDURE — 6360000002 HC RX W HCPCS: Performed by: EMERGENCY MEDICINE

## 2021-09-06 PROCEDURE — 80053 COMPREHEN METABOLIC PANEL: CPT

## 2021-09-06 PROCEDURE — 96365 THER/PROPH/DIAG IV INF INIT: CPT

## 2021-09-06 RX ORDER — CLOTRIMAZOLE AND BETAMETHASONE DIPROPIONATE 10; .64 MG/G; MG/G
CREAM TOPICAL
Qty: 15 G | Refills: 0 | Status: SHIPPED | OUTPATIENT
Start: 2021-09-06

## 2021-09-06 RX ADMIN — CALCIUM GLUCONATE 2000 MG: 98 INJECTION, SOLUTION INTRAVENOUS at 22:04

## 2021-09-06 ASSESSMENT — ENCOUNTER SYMPTOMS
VOMITING: 0
CONSTIPATION: 0
ABDOMINAL DISTENTION: 0
ABDOMINAL PAIN: 0
WHEEZING: 0
SHORTNESS OF BREATH: 0
VOICE CHANGE: 0
EYE ITCHING: 0
DIARRHEA: 0
EYE REDNESS: 0
TROUBLE SWALLOWING: 0
COUGH: 0
NAUSEA: 0
BACK PAIN: 0
CHOKING: 0
SINUS PRESSURE: 0
RHINORRHEA: 0
BLOOD IN STOOL: 0
CHEST TIGHTNESS: 0
EYE DISCHARGE: 0
PHOTOPHOBIA: 0
COLOR CHANGE: 0
SORE THROAT: 0
EYE PAIN: 0

## 2021-09-07 VITALS
OXYGEN SATURATION: 97 % | WEIGHT: 276 LBS | TEMPERATURE: 97.7 F | SYSTOLIC BLOOD PRESSURE: 96 MMHG | HEIGHT: 63 IN | HEART RATE: 58 BPM | RESPIRATION RATE: 20 BRPM | DIASTOLIC BLOOD PRESSURE: 64 MMHG | BODY MASS INDEX: 48.9 KG/M2

## 2021-09-07 LAB
EKG ATRIAL RATE: 59 BPM
EKG P AXIS: 48 DEGREES
EKG P-R INTERVAL: 128 MS
EKG Q-T INTERVAL: 488 MS
EKG QRS DURATION: 60 MS
EKG QTC CALCULATION (BAZETT): 483 MS
EKG R AXIS: 82 DEGREES
EKG T AXIS: 45 DEGREES
EKG VENTRICULAR RATE: 59 BPM

## 2021-09-07 NOTE — ED PROVIDER NOTES
[facility]  eMERGENCY dEPARTMENT eNCOUnter          [address] [facility]       Chief Complaint   Patient presents with    Abnormal Lab     low calcium       Nurses Notes reviewed and I agree except as noted in the HPI. HISTORY OF PRESENT ILLNESS    Keyur Goel is a 36 y.o. female who presents for hypocalcemia. Patient states that she felt low on her calcium. 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 Patient is on calcium supplementation. She states she is taking those. Patient felt like she was getting a little bit \"jumpy\" and knew that it was time to come in for treatment. Currently the patient is resting comfortably on the cot no apparent distress. She feels tired but otherwise has no other physical complaints. REVIEW OF SYSTEMS     Review of Systems   Constitutional: Positive for fatigue. Negative for activity change, appetite change, diaphoresis and unexpected weight change. HENT: Negative for congestion, ear discharge, ear pain, hearing loss, rhinorrhea, sinus pressure, sore throat, trouble swallowing and voice change. Eyes: Negative for photophobia, pain, discharge, redness and itching. Respiratory: Negative for cough, choking, chest tightness, shortness of breath and wheezing. Cardiovascular: Negative for chest pain, palpitations and leg swelling. Gastrointestinal: Negative for abdominal distention, abdominal pain, blood in stool, constipation, diarrhea, nausea and vomiting. Endocrine: Negative for polydipsia, polyphagia and polyuria. Genitourinary: Negative for decreased urine volume, difficulty urinating, dysuria, enuresis, frequency, hematuria and urgency. Musculoskeletal: Negative for arthralgias, back pain, gait problem, myalgias, neck pain and neck stiffness. Skin: Positive for rash (Third digit right hand). Negative for color change, pallor and wound. Allergic/Immunologic: Negative for immunocompromised state.    Neurological: Negative for dizziness, tremors, seizures, syncope, facial asymmetry, weakness, light-headedness, numbness and headaches. Hyperreflexic   Hematological: Negative for adenopathy. Does not bruise/bleed easily. Psychiatric/Behavioral: Negative for agitation, hallucinations and suicidal ideas. The patient is not nervous/anxious. PAST MEDICAL HISTORY    has a past medical history of Adjustment disorder, Anxiety, GERD (gastroesophageal reflux disease), Hypothyroidism, Obesity, Seasonal allergies, and Thyroid nodule. SURGICAL HISTORY      has a past surgical history that includes  section (,,); Cholecystectomy (); Hysterectomy (); Tubal ligation (); Bariatric Surgery (11/15/2018); Upper gastrointestinal endoscopy (11/15/2018); other surgical history (11/15/2018); Tonsillectomy; and Thyroidectomy (N/A, 2019).     CURRENT MEDICATIONS       Discharge Medication List as of 2021 11:55 PM      CONTINUE these medications which have NOT CHANGED    Details   calcitRIOL (ROCALTROL) 0.5 MCG capsule Take 2 capsules by mouth 4 times daily, Disp-240 capsule, R-0Print      ergocalciferol (ERGOCALCIFEROL) 1.25 MG (13753 UT) capsule Take 1 capsule by mouth once a week, Disp-4 capsule, R-0Print      busPIRone (BUSPAR) 7.5 MG tablet Take 7.5 mg by mouth as neededHistorical Med      calcium citrate (CALCITRATE) 250 MG TABS tablet Take 4 tablets by mouth 4 times daily, Disp-480 tablet,R-0Normal      magnesium oxide (MAG-OX) 400 (241.3 Mg) MG TABS tablet Take 1 tablet by mouth daily for 7 days, Disp-7 tablet,R-0Normal      Loratadine-Pseudoephedrine (CLARITIN-D 24 HOUR PO) Take 1 tablet by mouth dailyHistorical Med      levothyroxine (SYNTHROID) 200 MCG tablet Take 200 mcg by mouth DailyHistorical Med      ondansetron (ZOFRAN ODT) 4 MG disintegrating tablet Take 1 tablet by mouth every 8 hours as needed for Nausea, Disp-20 tablet, R-0Print      vitamin B-12 (CYANOCOBALAMIN) 500 MCG tablet Take 1 tablet by mouth daily, R-6Historical Med      spironolactone (ALDACTONE) 50 MG tablet Take 1 tablet by mouth as needed , R-6Historical Med      furosemide (LASIX) 40 MG tablet Take 1 tablet by mouth as needed , R-6Historical Med      estradiol (ESTRACE) 2 MG tablet Take 1 tablet by mouth daily, R-6Historical Med      Multiple Vitamin (MULTI VITAMIN DAILY PO) Take by mouth Indications: flinstonesHistorical Med             ALLERGIES     is allergic to latex. FAMILY HISTORY     She indicated that her mother is alive. She indicated that her father is alive. She indicated that her sister is alive. She indicated that her brother is alive. She indicated that her maternal grandmother is . She indicated that her maternal grandfather is . She indicated that her paternal grandmother is alive. She indicated that her paternal grandfather is . family history includes Arthritis in her father; Cancer in her paternal grandmother; Diabetes in her father, maternal grandfather, and paternal grandfather; Hypertension in her father; Obesity in her maternal grandmother; Other in her mother. SOCIAL HISTORY      reports that she has been smoking. She has been smoking about 1.00 pack per day. She has never used smokeless tobacco. She reports current alcohol use. She reports that she does not use drugs. PHYSICAL EXAM     INITIAL VITALS:  height is 5' 3\" (1.6 m) and weight is 276 lb (125.2 kg). Her oral temperature is 97.7 °F (36.5 °C). Her blood pressure is 96/64 and her pulse is 58. Her respiration is 20 and oxygen saturation is 97%. Physical Exam  Vitals and nursing note reviewed. Constitutional:       General: She is not in acute distress. Appearance: She is well-developed. She is not diaphoretic. HENT:      Head: Normocephalic and atraumatic.       Right Ear: External ear normal.      Left Ear: External ear normal.      Nose: Nose normal.      Mouth/Throat:      Pharynx: No oropharyngeal exudate. Eyes:      General: No scleral icterus. Right eye: No discharge. Left eye: No discharge. Conjunctiva/sclera: Conjunctivae normal.      Pupils: Pupils are equal, round, and reactive to light. Neck:      Thyroid: No thyromegaly. Vascular: No JVD. Trachea: No tracheal deviation. Cardiovascular:      Rate and Rhythm: Normal rate and regular rhythm. Heart sounds: Normal heart sounds, S1 normal and S2 normal. No murmur heard. No friction rub. No gallop. Pulmonary:      Effort: Pulmonary effort is normal.      Breath sounds: Normal breath sounds. No stridor. No wheezing, rhonchi or rales. Chest:      Chest wall: No tenderness. Abdominal:      General: Bowel sounds are normal. There is no distension. Palpations: Abdomen is soft. There is no mass. Tenderness: There is no abdominal tenderness. There is no guarding or rebound. Hernia: No hernia is present. Musculoskeletal:         General: No tenderness. Normal range of motion. Cervical back: Normal range of motion and neck supple. Lymphadenopathy:      Cervical: No cervical adenopathy. Skin:     General: Skin is warm and dry. Capillary Refill: Capillary refill takes less than 2 seconds. Findings: No bruising, ecchymosis, lesion or rash. Neurological:      General: No focal deficit present. Mental Status: She is alert and oriented to person, place, and time. Mental status is at baseline. GCS: GCS eye subscore is 4. GCS verbal subscore is 5. GCS motor subscore is 6. Cranial Nerves: Cranial nerves are intact. No cranial nerve deficit. Sensory: Sensation is intact. Motor: Motor function is intact. Coordination: Coordination is intact. Coordination normal.      Gait: Gait is intact. Deep Tendon Reflexes: Babinski sign absent on the right side. Babinski sign absent on the left side.       Reflex Scores:       Tricep reflexes are 3+ on the right side and 3+ on the left side. Bicep reflexes are 3+ on the right side and 3+ on the left side. Brachioradialis reflexes are 3+ on the right side and 3+ on the left side. Patellar reflexes are 3+ on the right side and 3+ on the left side. Achilles reflexes are 3+ on the right side and 3+ on the left side. Comments: Slightly hyperreflexic bilaterally upper and lower, positive Chvostek sign Trousseau's is negative   Psychiatric:         Speech: Speech normal.         Behavior: Behavior normal.         Thought Content: Thought content normal.         Judgment: Judgment normal.           DIFFERENTIAL DIAGNOSIS:   Hypocalcemia hypoparathyroidism, noncompliance with medications, eczema of hand, fungal infection of hand    DIAGNOSTIC RESULTS     EKG: All EKG's are interpreted by the Emergency Department Physician who either signs or Co-signs this chart in the absence of a cardiologist.  Sinus bradycardia, low voltage, normal axis, ventricular rate of 59 CT interval 128 QRS duration is 60 QT interval 488 QTC of 43.     RADIOLOGY: non-plain film images(s) such as CT, Ultrasound and MRI are read by the radiologist.  No orders to display         LABS:   Labs Reviewed   CBC WITH AUTO DIFFERENTIAL - Abnormal; Notable for the following components:       Result Value    MCHC 32.0 (*)     RDW-SD 48.4 (*)     All other components within normal limits   COMPREHENSIVE METABOLIC PANEL W/ REFLEX TO MG FOR LOW K - Abnormal; Notable for the following components:    Calcium 5.9 (*)     All other components within normal limits   CALCIUM, IONIZED - Abnormal; Notable for the following components:    Calcium, Ion 0.66 (*)     All other components within normal limits   GLOMERULAR FILTRATION RATE, ESTIMATED - Abnormal; Notable for the following components:    Est, Glom Filt Rate 61 (*)     All other components within normal limits   ANION GAP   OSMOLALITY       EMERGENCY DEPARTMENT COURSE:   Vitals:    Vitals: 09/06/21 2004 09/06/21 2203 09/07/21 0013   BP: 121/88 91/72 96/64   Pulse: 65 71 58   Resp: 17 17 20   Temp: 97.7 °F (36.5 °C)     TempSrc: Oral     SpO2: 100% 93% 97%   Weight: 276 lb (125.2 kg)     Height: 5' 3\" (1.6 m)       Patient was assessed at bedside appropriate labs and imaging were ordered. Patient was started on calcium gluconate here. This is typical for her treatment plan. This will be given over 2 hours. I went back in to reassess the patient after the treatment being complete she feels much better. At this point I feel the patient be safely discharged home. She is instructed take all her medications as prescribed she is instructed to follow-up with a primary care physician and do so within the next 1 to 2 days. Patient understood and agreed with the plan. Patient subsequently discharged home in stable condition. Patient has what appears to be hyperparathyroidism secondary to having her parathyroid completely removed. Patient had hypocalcemia. Patient also has what appears to be a fungal infection of her hand, she is using triamcinolone cream on this she is only started at the day she is to continue using it for the next 2 to 3 days if there is no improvement she is instructed to switch to the Chlortrimazole. Patient is instructed to take her calcitriol as directed. She is instructed to follow-up with her primary care physician and do so within the next 1 to 2 days. She is instructed to return to the nearest emergency room immediately for any new or worsening complaints. CRITICAL CARE:   None    CONSULTS:  None    PROCEDURES:  None    FINAL IMPRESSION      1. Hypocalcemia    2. Hypoparathyroidism after procedure ([address]    3.  Rash of hand          DISPOSITION/PLAN   Discharge    PATIENT REFERRED TO:  EBEN Olmedo CNP  [place]  [address] [place] [address]  [phone]    Call in 1 day        DISCHARGE MEDICATIONS:  Discharge Medication List as of 9/6/2021 11:55 PM      START

## 2021-09-17 ENCOUNTER — HOSPITAL ENCOUNTER (EMERGENCY)
Age: 41
Discharge: HOME | End: 2021-09-17
Attending: EMERGENCY MEDICINE
Payer: COMMERCIAL

## 2021-09-17 VITALS
RESPIRATION RATE: 18 BRPM | HEART RATE: 67 BPM | OXYGEN SATURATION: 95 % | SYSTOLIC BLOOD PRESSURE: 96 MMHG | TEMPERATURE: 98.1 F | DIASTOLIC BLOOD PRESSURE: 79 MMHG

## 2021-09-17 DIAGNOSIS — E83.51 HYPOCALCEMIA: Primary | ICD-10-CM

## 2021-09-17 LAB
ANION GAP SERPL CALCULATED.3IONS-SCNC: 15 MEQ/L (ref 8–16)
BASOPHILS # BLD: 1.1 %
BASOPHILS ABSOLUTE: 0.1 THOU/MM3 (ref 0–0.1)
BUN BLDV-MCNC: 18 MG/DL (ref 7–22)
CALCIUM IONIZED: 0.57 MMOL/L (ref 1.12–1.32)
CALCIUM SERPL-MCNC: 5.2 MG/DL (ref 8.5–10.5)
CHLORIDE BLD-SCNC: 100 MEQ/L (ref 98–111)
CO2: 26 MEQ/L (ref 23–33)
CREAT SERPL-MCNC: 1 MG/DL (ref 0.4–1.2)
EOSINOPHIL # BLD: 4.2 %
EOSINOPHILS ABSOLUTE: 0.3 THOU/MM3 (ref 0–0.4)
ERYTHROCYTE [DISTWIDTH] IN BLOOD BY AUTOMATED COUNT: 14.3 % (ref 11.5–14.5)
ERYTHROCYTE [DISTWIDTH] IN BLOOD BY AUTOMATED COUNT: 49.7 FL (ref 35–45)
GFR SERPL CREATININE-BSD FRML MDRD: 61 ML/MIN/1.73M2
GLUCOSE BLD-MCNC: 95 MG/DL (ref 70–108)
HCT VFR BLD CALC: 41.4 % (ref 37–47)
HEMOGLOBIN: 13.1 GM/DL (ref 12–16)
IMMATURE GRANS (ABS): 0.03 THOU/MM3 (ref 0–0.07)
IMMATURE GRANULOCYTES: 0.4 %
LYMPHOCYTES # BLD: 24.9 %
LYMPHOCYTES ABSOLUTE: 1.9 THOU/MM3 (ref 1–4.8)
MCH RBC QN AUTO: 29.8 PG (ref 26–33)
MCHC RBC AUTO-ENTMCNC: 31.6 GM/DL (ref 32.2–35.5)
MCV RBC AUTO: 94.3 FL (ref 81–99)
MONOCYTES # BLD: 5.3 %
MONOCYTES ABSOLUTE: 0.4 THOU/MM3 (ref 0.4–1.3)
NUCLEATED RED BLOOD CELLS: 0 /100 WBC
OSMOLALITY CALCULATION: 283 MOSMOL/KG (ref 275–300)
PLATELET # BLD: 185 THOU/MM3 (ref 130–400)
PMV BLD AUTO: 11.7 FL (ref 9.4–12.4)
POTASSIUM REFLEX MAGNESIUM: 4.4 MEQ/L (ref 3.5–5.2)
RBC # BLD: 4.39 MILL/MM3 (ref 4.2–5.4)
SEG NEUTROPHILS: 64.1 %
SEGMENTED NEUTROPHILS ABSOLUTE COUNT: 4.9 THOU/MM3 (ref 1.8–7.7)
SODIUM BLD-SCNC: 141 MEQ/L (ref 135–145)
WBC # BLD: 7.6 THOU/MM3 (ref 4.8–10.8)

## 2021-09-17 PROCEDURE — 93005 ELECTROCARDIOGRAM TRACING: CPT | Performed by: EMERGENCY MEDICINE

## 2021-09-17 PROCEDURE — 6360000002 HC RX W HCPCS: Performed by: EMERGENCY MEDICINE

## 2021-09-17 PROCEDURE — 82330 ASSAY OF CALCIUM: CPT

## 2021-09-17 PROCEDURE — 36415 COLL VENOUS BLD VENIPUNCTURE: CPT

## 2021-09-17 PROCEDURE — 96366 THER/PROPH/DIAG IV INF ADDON: CPT

## 2021-09-17 PROCEDURE — 6370000000 HC RX 637 (ALT 250 FOR IP): Performed by: EMERGENCY MEDICINE

## 2021-09-17 PROCEDURE — 85025 COMPLETE CBC W/AUTO DIFF WBC: CPT

## 2021-09-17 PROCEDURE — 2580000003 HC RX 258: Performed by: EMERGENCY MEDICINE

## 2021-09-17 PROCEDURE — 96365 THER/PROPH/DIAG IV INF INIT: CPT

## 2021-09-17 PROCEDURE — 80048 BASIC METABOLIC PNL TOTAL CA: CPT

## 2021-09-17 PROCEDURE — 99283 EMERGENCY DEPT VISIT LOW MDM: CPT

## 2021-09-17 RX ORDER — CALCIUM GLUCONATE 94 MG/ML
2000 INJECTION, SOLUTION INTRAVENOUS ONCE
Status: DISCONTINUED | OUTPATIENT
Start: 2021-09-17 | End: 2021-09-17 | Stop reason: SDUPTHER

## 2021-09-17 RX ORDER — CALCIUM CARBONATE 200(500)MG
1500 TABLET,CHEWABLE ORAL ONCE
Status: COMPLETED | OUTPATIENT
Start: 2021-09-17 | End: 2021-09-17

## 2021-09-17 RX ORDER — CALCITRIOL 0.25 UG/1
0.5 CAPSULE, LIQUID FILLED ORAL DAILY
Status: DISCONTINUED | OUTPATIENT
Start: 2021-09-17 | End: 2021-09-17 | Stop reason: HOSPADM

## 2021-09-17 RX ADMIN — ANTACID TABLETS 1500 MG: 500 TABLET, CHEWABLE ORAL at 17:27

## 2021-09-17 RX ADMIN — CALCITRIOL 0.5 MCG: 0.25 CAPSULE ORAL at 16:12

## 2021-09-17 RX ADMIN — CALCIUM GLUCONATE 2000 MG: 98 INJECTION, SOLUTION INTRAVENOUS at 16:12

## 2021-09-17 ASSESSMENT — PAIN SCALES - GENERAL: PAINLEVEL_OUTOF10: 10

## 2021-09-17 NOTE — ED TRIAGE NOTES
PT to the ED with c/o low calcium. Pt states she has been having muscle cramps along with numbness and tingling in her face and arms. Pt states has recurring low calcium issues.

## 2021-09-17 NOTE — ED PROVIDER NOTES
6:48 PM see patient in signout. Patient here for muscle cramping and found to be severely hypocalcemic. She has a history of hypoparathyroidism and needs replacement of her calcium quite often. Repeat EKG after treatment shows a QT of 500 and a QTC of 486. Patient is feeling better with no muscle cramps. Will discharge home.     DISPOSITION Decision To Discharge 09/17/2021 06:45:18 PM         Sondra Marroquin MD  09/17/21 8416

## 2021-09-17 NOTE — ED NOTES
Patient resting in bed. Respirations easy and unlabored. No distress noted. Call light within reach.        Juan Palacio RN  09/17/21 9942

## 2021-09-18 LAB
EKG ATRIAL RATE: 57 BPM
EKG ATRIAL RATE: 68 BPM
EKG P AXIS: 47 DEGREES
EKG P AXIS: 57 DEGREES
EKG P-R INTERVAL: 132 MS
EKG P-R INTERVAL: 170 MS
EKG Q-T INTERVAL: 470 MS
EKG Q-T INTERVAL: 500 MS
EKG QRS DURATION: 58 MS
EKG QRS DURATION: 60 MS
EKG QTC CALCULATION (BAZETT): 486 MS
EKG QTC CALCULATION (BAZETT): 499 MS
EKG R AXIS: 84 DEGREES
EKG R AXIS: 88 DEGREES
EKG T AXIS: 25 DEGREES
EKG T AXIS: 39 DEGREES
EKG VENTRICULAR RATE: 57 BPM
EKG VENTRICULAR RATE: 68 BPM

## 2021-09-22 ASSESSMENT — ENCOUNTER SYMPTOMS
WHEEZING: 0
ABDOMINAL PAIN: 0
DIARRHEA: 0
VOMITING: 0
NAUSEA: 0
CHEST TIGHTNESS: 0
SORE THROAT: 0
EYE REDNESS: 0
COUGH: 0
BACK PAIN: 0
RHINORRHEA: 0
SHORTNESS OF BREATH: 0
CONSTIPATION: 0

## 2021-09-22 NOTE — ED PROVIDER NOTES
[facility] ENCOUNTER          Pt Name: Marylu Felix  MRN: [id number]  [place] 1980  Date of evaluation: 9/17/2021  Emergency Physician: Natalie [address]ox [address]       Chief Complaint   Patient presents with    Abnormal Lab     low calcium     History obtained from the patient. HISTORY OF PRESENT ILLNESS    HPI  Marylu Felix is a 36 y.o. female who presents to the emergency department for evaluation of low calcium. Patient with pmhx of hypoparathyroidism and gastric bypass. She reports she is compliant with her supplement regimen. She states occasionally her calcium gets too low because she has absorption issues due to gastric bypass. She noticed tingling and cramping to bilateral hands for the last 2 days so she felt her Ca was low so she presented to the ED. The patient has no other acute complaints at this time. REVIEW OF SYSTEMS   Review of Systems   Constitutional: Negative for chills and fever. HENT: Negative for congestion, rhinorrhea and sore throat. Eyes: Negative for redness and visual disturbance. Respiratory: Negative for cough, chest tightness, shortness of breath and wheezing. Cardiovascular: Negative for chest pain and leg swelling. Gastrointestinal: Negative for abdominal pain, constipation, diarrhea, nausea and vomiting. Endocrine: Negative for polydipsia and polyuria. Genitourinary: Negative for decreased urine volume, dysuria and urgency. Musculoskeletal: Positive for myalgias. Negative for back pain. Skin: Negative for rash and wound. Neurological: Positive for numbness. Negative for light-headedness and headaches. Hematological: Does not bruise/bleed easily. Psychiatric/Behavioral: Negative for decreased concentration and dysphoric mood.          PAST MEDICAL AND SURGICAL HISTORY     Past Medical History:   Diagnosis Date    Adjustment disorder     Anxiety     GERD Rfl: 6    spironolactone (ALDACTONE) 50 MG tablet, Take 1 tablet by mouth as needed , Disp: , Rfl: 6    furosemide (LASIX) 40 MG tablet, Take 1 tablet by mouth as needed , Disp: , Rfl: 6    estradiol (ESTRACE) 2 MG tablet, Take 1 tablet by mouth daily, Disp: , Rfl: 6    Multiple Vitamin (MULTI VITAMIN DAILY PO), Take by mouth Indications: flinstones, Disp: , Rfl:       SOCIAL HISTORY     Social History     Social History Narrative    Not on file     Social History     Tobacco Use    Smoking status: Current Every Day Smoker     Packs/day: 1.00    Smokeless tobacco: Never Used   Vaping Use    Vaping Use: Some days    Substances: Occasionally   Substance Use Topics    Alcohol use: Yes     Comment: social holidays     Drug use: Never         ALLERGIES     Allergies   Allergen Reactions    Latex Itching and Rash         FAMILY HISTORY     Family History   Problem Relation Age of Onset    Other Mother         gallbladder disease    Diabetes Father     Hypertension Father     Arthritis Father     Diabetes Paternal Grandfather     Cancer Paternal Grandmother     Obesity Maternal Grandmother     Diabetes Maternal Grandfather          PHYSICAL EXAM     ED Triage Vitals   BP Temp Temp src Pulse Resp SpO2 Height Weight   09/17/21 1515 09/17/21 1614 -- 09/17/21 1515 09/17/21 1515 09/17/21 1515 -- --   108/74 98.1 °F (36.7 °C)  75 16 97 %           Additional Vital Signs:  Vitals:    09/17/21 1734   BP: 96/79   Pulse: 67   Resp: 18   Temp:    SpO2: 95%       Physical Exam  Constitutional:       General: She is not in acute distress. Appearance: She is well-developed. She is not diaphoretic. HENT:      Head: Normocephalic and atraumatic. Mouth/Throat:      Comments: Positive chevosteks    Eyes:      General:         Right eye: No discharge. Left eye: No discharge. Conjunctiva/sclera: Conjunctivae normal.      Pupils: Pupils are equal, round, and reactive to light.    Neck:      Thyroid: No thyromegaly. Vascular: No JVD. Cardiovascular:      Rate and Rhythm: Normal rate and regular rhythm. Heart sounds: Normal heart sounds. Pulmonary:      Effort: Pulmonary effort is normal. No respiratory distress. Breath sounds: Normal breath sounds. Abdominal:      General: Bowel sounds are normal. There is no distension. Palpations: Abdomen is soft. Tenderness: There is no abdominal tenderness. Musculoskeletal:         General: No tenderness. Normal range of motion. Cervical back: Normal range of motion and neck supple. Comments: postisive treosseau signs     Lymphadenopathy:      Cervical: No cervical adenopathy. Skin:     General: Skin is warm and dry. Capillary Refill: Capillary refill takes less than 2 seconds. Findings: No rash. Neurological:      Mental Status: She is alert and oriented to person, place, and time. She is not disoriented. GCS: GCS eye subscore is 4. GCS verbal subscore is 5. GCS motor subscore is 6. Motor: No abnormal muscle tone or seizure activity. Gait: Gait normal.      Comments: Awake and alert. Moves all extremities. Non-focal exam with steady gait. Initial vital signs and nursing assessment reviewed and normal.   Pulsoximetry is normal per my interpretation. MEDICAL DECISION MAKING   Initial Assessment: Given the patient's above chief complaint and findings on history and physical examination, I thought it was appropriate to consider the following emergency medical conditions:low calcium. Although some of these diagnoses are unlikely they were considered in my medical decision making.     Plan: CBC, BMP, ECG, Symptomatic treatment with Calcium supplementation and reassess         ED RESULTS   Laboratory results:  Labs Reviewed   CBC WITH AUTO DIFFERENTIAL - Abnormal; Notable for the following components:       Result Value    MCHC 31.6 (*)     RDW-SD 49.7 (*)     All other components within normal limits   BASIC METABOLIC PANEL W/ REFLEX TO MG FOR LOW K - Abnormal; Notable for the following components:    Calcium 5.2 (*)     All other components within normal limits   CALCIUM, IONIZED - Abnormal; Notable for the following components:    Calcium, Ion 0.57 (*)     All other components within normal limits   GLOMERULAR FILTRATION RATE, ESTIMATED - Abnormal; Notable for the following components:    Est, Glom Filt Rate 61 (*)     All other components within normal limits   ANION GAP   OSMOLALITY       Radiologic studies results:  No orders to display       ED Medications administered this visit:   Medications   calcium gluconate 2,000 mg in dextrose 5 % 100 mL IVPB (0 mg IntraVENous Stopped 9/17/21 1812)   calcium carbonate (TUMS) chewable tablet 1,500 mg (1,500 mg Oral Given 9/17/21 1727)         ED COURSE    plan replacement of electrolytes. Trend response. ECG for Qtc prolongation improvement. The diagnosis, extensive differential diagnosis, laboratory and imaging findings were discussed at the bedside. The patient was an active participant in their care. They are agreeable to the plan of care. All questions and concerns were addressed at the time of the encounter. MEDICATION CHANGES     DISCHARGE MEDICATIONS:  Discharge Medication List as of 9/17/2021  6:45 PM               FINAL DISPOSITION     Final diagnoses:   Hypocalcemia     Condition: condition: good  Dispo: Other Disposition: pending signed out to Dr. Bert Kay for final disposition      PATIENT REFERRED TO:  EBEN Candelaria CNP  [place] [address] [place] [address]  [phone]    Schedule an appointment as soon as possible for a visit   If symptoms worsen      Critical Care Time   None      This transcription was electronically signed. Parts of this transcriptions may have been dictated by use of voice recognition software and electronically transcribed, and parts may have been transcribed with the assistance of an ED scribe.  The transcription may contain errors not detected in proofreading.     Electronically Signed: Lily Tavares DO, 09/22/21, 12:27 AM      Lily Tavares DO  09/22/21 5526

## 2021-09-29 ENCOUNTER — HOSPITAL ENCOUNTER (EMERGENCY)
Age: 41
Discharge: HOME | End: 2021-09-29
Payer: COMMERCIAL

## 2021-09-29 VITALS
RESPIRATION RATE: 16 BRPM | HEART RATE: 77 BPM | SYSTOLIC BLOOD PRESSURE: 110 MMHG | DIASTOLIC BLOOD PRESSURE: 81 MMHG | TEMPERATURE: 97.8 F | OXYGEN SATURATION: 99 %

## 2021-09-29 DIAGNOSIS — E83.51 HYPOCALCEMIA: Primary | ICD-10-CM

## 2021-09-29 DIAGNOSIS — R94.31 QT PROLONGATION: ICD-10-CM

## 2021-09-29 DIAGNOSIS — E03.9 HYPOTHYROIDISM, UNSPECIFIED TYPE: ICD-10-CM

## 2021-09-29 LAB
ALBUMIN SERPL-MCNC: 4.3 G/DL (ref 3.5–5.1)
ALP BLD-CCNC: 108 U/L (ref 38–126)
ALT SERPL-CCNC: 39 U/L (ref 11–66)
ANION GAP SERPL CALCULATED.3IONS-SCNC: 14 MEQ/L (ref 8–16)
AST SERPL-CCNC: 53 U/L (ref 5–40)
BASOPHILS # BLD: 1.2 %
BASOPHILS ABSOLUTE: 0.1 THOU/MM3 (ref 0–0.1)
BILIRUB SERPL-MCNC: 0.2 MG/DL (ref 0.3–1.2)
BUN BLDV-MCNC: 17 MG/DL (ref 7–22)
CALCIUM IONIZED: 0.59 MMOL/L (ref 1.12–1.32)
CALCIUM SERPL-MCNC: 5.4 MG/DL (ref 8.5–10.5)
CHLORIDE BLD-SCNC: 99 MEQ/L (ref 98–111)
CO2: 29 MEQ/L (ref 23–33)
CREAT SERPL-MCNC: 1.2 MG/DL (ref 0.4–1.2)
EOSINOPHIL # BLD: 4.2 %
EOSINOPHILS ABSOLUTE: 0.3 THOU/MM3 (ref 0–0.4)
ERYTHROCYTE [DISTWIDTH] IN BLOOD BY AUTOMATED COUNT: 14.5 % (ref 11.5–14.5)
ERYTHROCYTE [DISTWIDTH] IN BLOOD BY AUTOMATED COUNT: 50 FL (ref 35–45)
GFR SERPL CREATININE-BSD FRML MDRD: 50 ML/MIN/1.73M2
GLUCOSE BLD-MCNC: 89 MG/DL (ref 70–108)
HCT VFR BLD CALC: 41.1 % (ref 37–47)
HEMOGLOBIN: 12.8 GM/DL (ref 12–16)
IMMATURE GRANS (ABS): 0.01 THOU/MM3 (ref 0–0.07)
IMMATURE GRANULOCYTES: 0.1 %
LYMPHOCYTES # BLD: 26.3 %
LYMPHOCYTES ABSOLUTE: 1.8 THOU/MM3 (ref 1–4.8)
MAGNESIUM: 2 MG/DL (ref 1.6–2.4)
MCH RBC QN AUTO: 29.3 PG (ref 26–33)
MCHC RBC AUTO-ENTMCNC: 31.1 GM/DL (ref 32.2–35.5)
MCV RBC AUTO: 94.1 FL (ref 81–99)
MONOCYTES # BLD: 5 %
MONOCYTES ABSOLUTE: 0.4 THOU/MM3 (ref 0.4–1.3)
NUCLEATED RED BLOOD CELLS: 0 /100 WBC
OSMOLALITY CALCULATION: 284.1 MOSMOL/KG (ref 275–300)
PLATELET # BLD: 173 THOU/MM3 (ref 130–400)
PMV BLD AUTO: 11.9 FL (ref 9.4–12.4)
POTASSIUM REFLEX MAGNESIUM: 4.7 MEQ/L (ref 3.5–5.2)
RBC # BLD: 4.37 MILL/MM3 (ref 4.2–5.4)
SEG NEUTROPHILS: 63.2 %
SEGMENTED NEUTROPHILS ABSOLUTE COUNT: 4.4 THOU/MM3 (ref 1.8–7.7)
SODIUM BLD-SCNC: 142 MEQ/L (ref 135–145)
T4 FREE: < 0.1 NG/DL (ref 0.93–1.76)
TOTAL PROTEIN: 7.2 G/DL (ref 6.1–8)
TSH SERPL DL<=0.05 MIU/L-ACNC: 76.1 UIU/ML (ref 0.4–4.2)
WBC # BLD: 7 THOU/MM3 (ref 4.8–10.8)

## 2021-09-29 PROCEDURE — 2580000003 HC RX 258: Performed by: PHYSICIAN ASSISTANT

## 2021-09-29 PROCEDURE — 96368 THER/DIAG CONCURRENT INF: CPT

## 2021-09-29 PROCEDURE — 84439 ASSAY OF FREE THYROXINE: CPT

## 2021-09-29 PROCEDURE — 80053 COMPREHEN METABOLIC PANEL: CPT

## 2021-09-29 PROCEDURE — 6360000002 HC RX W HCPCS: Performed by: PHYSICIAN ASSISTANT

## 2021-09-29 PROCEDURE — 84443 ASSAY THYROID STIM HORMONE: CPT

## 2021-09-29 PROCEDURE — 82330 ASSAY OF CALCIUM: CPT

## 2021-09-29 PROCEDURE — 36415 COLL VENOUS BLD VENIPUNCTURE: CPT

## 2021-09-29 PROCEDURE — 99284 EMERGENCY DEPT VISIT MOD MDM: CPT

## 2021-09-29 PROCEDURE — 96365 THER/PROPH/DIAG IV INF INIT: CPT

## 2021-09-29 PROCEDURE — 6370000000 HC RX 637 (ALT 250 FOR IP): Performed by: PHYSICIAN ASSISTANT

## 2021-09-29 PROCEDURE — 96366 THER/PROPH/DIAG IV INF ADDON: CPT

## 2021-09-29 PROCEDURE — 83735 ASSAY OF MAGNESIUM: CPT

## 2021-09-29 PROCEDURE — 85025 COMPLETE CBC W/AUTO DIFF WBC: CPT

## 2021-09-29 PROCEDURE — 93005 ELECTROCARDIOGRAM TRACING: CPT | Performed by: PHYSICIAN ASSISTANT

## 2021-09-29 RX ORDER — MAGNESIUM SULFATE IN WATER 40 MG/ML
2000 INJECTION, SOLUTION INTRAVENOUS ONCE
Status: COMPLETED | OUTPATIENT
Start: 2021-09-29 | End: 2021-09-29

## 2021-09-29 RX ORDER — 0.9 % SODIUM CHLORIDE 0.9 %
1000 INTRAVENOUS SOLUTION INTRAVENOUS ONCE
Status: COMPLETED | OUTPATIENT
Start: 2021-09-29 | End: 2021-09-29

## 2021-09-29 RX ORDER — CALCIUM CARBONATE 200(500)MG
1500 TABLET,CHEWABLE ORAL 3 TIMES DAILY PRN
Status: DISCONTINUED | OUTPATIENT
Start: 2021-09-29 | End: 2021-09-29 | Stop reason: HOSPADM

## 2021-09-29 RX ADMIN — CALCIUM GLUCONATE 2000 MG: 98 INJECTION, SOLUTION INTRAVENOUS at 16:57

## 2021-09-29 RX ADMIN — SODIUM CHLORIDE 1000 ML: 9 INJECTION, SOLUTION INTRAVENOUS at 13:37

## 2021-09-29 RX ADMIN — MAGNESIUM SULFATE HEPTAHYDRATE 2000 MG: 40 INJECTION, SOLUTION INTRAVENOUS at 14:41

## 2021-09-29 RX ADMIN — ANTACID TABLETS 1500 MG: 500 TABLET, CHEWABLE ORAL at 14:42

## 2021-09-29 NOTE — ED NOTES
Upon first contact, patient sitting on cart backwards with legs crossed and headphones in. Would not make eye contact with nurse. Patient encouraged to turn around in bed for proper placement of EKG leads. Patient refused to get into gown, states she \"doesn't need all this\". Patient refused telemetry. She refused to have blood pressure cuff and pulse ox taken. Patient agreed for one time then to remove all equipment. Patient refused to leave EKG stickers in place, states she is not getting another EKG. Patient refusing IV fluids, states she does not need them. RN encouraged patient to let IV fluids infuse while waiting on lab results. Patient states \"just let me watch my show\" and got on her phone. RN updated provider.      Deana Majano RN  09/29/21 1713

## 2021-09-30 LAB
EKG ATRIAL RATE: 65 BPM
EKG ATRIAL RATE: 72 BPM
EKG P AXIS: 59 DEGREES
EKG P AXIS: 63 DEGREES
EKG P-R INTERVAL: 146 MS
EKG P-R INTERVAL: 152 MS
EKG Q-T INTERVAL: 400 MS
EKG Q-T INTERVAL: 478 MS
EKG QRS DURATION: 56 MS
EKG QRS DURATION: 64 MS
EKG QTC CALCULATION (BAZETT): 438 MS
EKG QTC CALCULATION (BAZETT): 497 MS
EKG R AXIS: 88 DEGREES
EKG R AXIS: 99 DEGREES
EKG T AXIS: 50 DEGREES
EKG T AXIS: 67 DEGREES
EKG VENTRICULAR RATE: 65 BPM
EKG VENTRICULAR RATE: 72 BPM

## 2021-09-30 PROCEDURE — 93010 ELECTROCARDIOGRAM REPORT: CPT | Performed by: INTERNAL MEDICINE

## 2021-10-01 NOTE — ED PROVIDER NOTES
dizziness and numbness. Negative for seizures and syncope. All other systems reviewed and are negative. PAST MEDICAL HISTORY    has a past medical history of Adjustment disorder, Anxiety, GERD (gastroesophageal reflux disease), Hypothyroidism, Obesity, Seasonal allergies, and Thyroid nodule. SURGICAL HISTORY      has a past surgical history that includes  section (,,); Cholecystectomy (); Hysterectomy (); Tubal ligation (); Bariatric Surgery (11/15/2018); Upper gastrointestinal endoscopy (11/15/2018); other surgical history (11/15/2018); Tonsillectomy; and Thyroidectomy (N/A, 2019). CURRENT MEDICATIONS       Discharge Medication List as of 2021  5:49 PM      CONTINUE these medications which have NOT CHANGED    Details   clotrimazole-betamethasone (LOTRISONE) 1-0.05 % cream Apply topically 2 times daily. , Disp-15 g, R-0, Print      calcitRIOL (ROCALTROL) 0.5 MCG capsule Take 2 capsules by mouth 4 times daily, Disp-240 capsule, R-0Print      ergocalciferol (ERGOCALCIFEROL) 1.25 MG (84786 UT) capsule Take 1 capsule by mouth once a week, Disp-4 capsule, R-0Print      busPIRone (BUSPAR) 7.5 MG tablet Take 7.5 mg by mouth as neededHistorical Med      calcium citrate (CALCITRATE) 250 MG TABS tablet Take 4 tablets by mouth 4 times daily, Disp-480 tablet,R-0Normal      magnesium oxide (MAG-OX) 400 (241.3 Mg) MG TABS tablet Take 1 tablet by mouth daily for 7 days, Disp-7 tablet,R-0Normal      Loratadine-Pseudoephedrine (CLARITIN-D 24 HOUR PO) Take 1 tablet by mouth dailyHistorical Med      levothyroxine (SYNTHROID) 200 MCG tablet Take 200 mcg by mouth DailyHistorical Med      ondansetron (ZOFRAN ODT) 4 MG disintegrating tablet Take 1 tablet by mouth every 8 hours as needed for Nausea, Disp-20 tablet, R-0Print      vitamin B-12 (CYANOCOBALAMIN) 500 MCG tablet Take 1 tablet by mouth daily, R-6Historical Med      spironolactone (ALDACTONE) 50 MG tablet Take 1 tablet by mouth as needed , R-6Historical Med      furosemide (LASIX) 40 MG tablet Take 1 tablet by mouth as needed , R-6Historical Med      estradiol (ESTRACE) 2 MG tablet Take 1 tablet by mouth daily, R-6Historical Med      Multiple Vitamin (MULTI VITAMIN DAILY PO) Take by mouth Indications: flinstonesHistorical Med             ALLERGIES     is allergic to latex. FAMILY HISTORY     She indicated that her mother is alive. She indicated that her father is alive. She indicated that her sister is alive. She indicated that her brother is alive. She indicated that her maternal grandmother is . She indicated that her maternal grandfather is . She indicated that her paternal grandmother is alive. She indicated that her paternal grandfather is . family history includes Arthritis in her father; Cancer in her paternal grandmother; Diabetes in her father, maternal grandfather, and paternal grandfather; Hypertension in her father; Obesity in her maternal grandmother; Other in her mother. SOCIAL HISTORY      reports that she has been smoking. She has been smoking about 1.00 pack per day. She has never used smokeless tobacco. She reports current alcohol use. She reports that she does not use drugs. PHYSICAL EXAM     INITIAL VITALS:  temperature is 97.8 °F (36.6 °C). Her blood pressure is 110/81 and her pulse is 77. Her respiration is 16 and oxygen saturation is 99%. Physical Exam  Vitals and nursing note reviewed. Constitutional:       Appearance: Normal appearance. HENT:      Head: Normocephalic and atraumatic. Eyes:      Conjunctiva/sclera: Conjunctivae normal.   Cardiovascular:      Rate and Rhythm: Normal rate and regular rhythm. Heart sounds: Normal heart sounds. Pulmonary:      Effort: Pulmonary effort is normal. No respiratory distress. Breath sounds: Normal breath sounds. Abdominal:      Palpations: Abdomen is soft. Tenderness: There is no abdominal tenderness. Musculoskeletal:      Cervical back: Normal range of motion. Skin:     General: Skin is warm and dry. Neurological:      General: No focal deficit present. Mental Status: She is alert and oriented to person, place, and time. Sensory: No sensory deficit. Motor: No weakness. Psychiatric:         Mood and Affect: Mood normal.         DIFFERENTIAL DIAGNOSIS:   Differential diagnoses are discussed    DIAGNOSTIC RESULTS     EKG: All EKG's are interpreted by the Emergency Department Physician who either signs or Co-signsthis chart in the absence of a cardiologist.    9/29/21   1321  Vent. Rate: 65 bpm  PRinterval: 152 ms  QRS duration: 56 ms  QTc: 497 ms  P-R-T axes: 63, 99, 50  NSR. No STEMI. Compared to old EKG on 9-17-21 9/29/21   1834  Vent. Rate: 72 bpm  PRinterval: 146 ms  QRS duration: 64 ms  QTc: 438 ms  P-R-T axes: 59, 88, 67  NSR. No STEMI.   Compared to old EKG on 9-29-21      RADIOLOGY: non-plain film images(s) such as CT, Ultrasound and MRI are read by the radiologist.    No orders to display       LABS:      Labs Reviewed   CBC WITH AUTO DIFFERENTIAL - Abnormal; Notable for the following components:       Result Value    MCHC 31.1 (*)     RDW-SD 50.0 (*)     All other components within normal limits   COMPREHENSIVE METABOLIC PANEL W/ REFLEX TO MG FOR LOW K - Abnormal; Notable for the following components:    Calcium 5.4 (*)     AST 53 (*)     Total Bilirubin 0.2 (*)     All other components within normal limits   CALCIUM, IONIZED - Abnormal; Notable for the following components:    Calcium, Ion 0.59 (*)     All other components within normal limits   TSH WITH REFLEX - Abnormal; Notable for the following components:    TSH 76.100 (*)     All other components within normal limits   T4, FREE - Abnormal; Notable for the following components:    T4 Free < 0.10 (*)     All other components within normal limits   GLOMERULAR FILTRATION RATE, ESTIMATED - Abnormal; Notable for the following components:    Est, Glom Filt Rate 50 (*)     All other components within normal limits   ANION GAP   OSMOLALITY   MAGNESIUM       EMERGENCY DEPARTMENT COURSE:   Vitals:    Vitals:    09/29/21 1226 09/29/21 1327 09/29/21 1509 09/29/21 1837   BP:  112/73 110/81    Pulse:  70 67 77   Resp:  16 20 16   Temp: 97.8 °F (36.6 °C)      SpO2:  97% 96% 99%      6:49 AM EDT: The patient was seen and evaluated. Patient presents for concern of hypocalcemia. She has history of this is a recurrent issue and complaints of the same symptoms today. She has reassuring vital signs. QTC was noted to be 497 on initial EKG and given her significant electrolyte t abnormalities, magnesium was given. Calcium was noted to be 5.4 with ionized calcium 0.59 and 2 g of calcium supplementation was provided with Tums. TSH resulted 76 with free T4 less than 0.1. Patient reports compliance with her prescribed levothyroxine and has an upcoming endocrinology referral for this. Follow-up EKG showed significant improvement in her QTC. At this time she is feeling much better and desires discharge home. Work note was provided and return precautions discussed. CRITICAL CARE:   None    CONSULTS:  None     PROCEDURES:  None    FINAL IMPRESSION      1. Hypocalcemia    2. QT prolongation    3.  Hypothyroidism, unspecified type          DISPOSITION/PLAN   Discharge    PATIENT REFERRED TO:  EBEN Brown CNP  [place] [address]  [phone]    Call in 1 day      [address] [facility]  [address] [facility]  [phone]    If symptoms worsen      DISCHARGEMEDICATIONS:  Discharge Medication List as of 9/29/2021  5:49 PM          (Please note that portions of this note were completedwith a voice recognition program.  Efforts were made to edit the dictations but occasionally words are mis-transcribed.)        Mary Grace Pacheco PA-C  10/01/21 0079

## 2021-12-01 ENCOUNTER — HOSPITAL ENCOUNTER (EMERGENCY)
Age: 41
Discharge: HOME | End: 2021-12-01
Attending: EMERGENCY MEDICINE
Payer: COMMERCIAL

## 2021-12-01 VITALS
BODY MASS INDEX: 48.73 KG/M2 | TEMPERATURE: 97.8 F | RESPIRATION RATE: 20 BRPM | WEIGHT: 275 LBS | OXYGEN SATURATION: 90 % | HEIGHT: 63 IN | HEART RATE: 87 BPM | DIASTOLIC BLOOD PRESSURE: 75 MMHG | SYSTOLIC BLOOD PRESSURE: 104 MMHG

## 2021-12-01 DIAGNOSIS — U07.1 COVID-19: Primary | ICD-10-CM

## 2021-12-01 LAB
ALBUMIN SERPL-MCNC: 4.1 G/DL (ref 3.5–5.1)
ALP BLD-CCNC: 124 U/L (ref 38–126)
ALT SERPL-CCNC: 24 U/L (ref 11–66)
ANION GAP SERPL CALCULATED.3IONS-SCNC: 14 MEQ/L (ref 8–16)
AST SERPL-CCNC: 35 U/L (ref 5–40)
BASOPHILS # BLD: 0.6 %
BASOPHILS ABSOLUTE: 0 THOU/MM3 (ref 0–0.1)
BILIRUB SERPL-MCNC: 0.3 MG/DL (ref 0.3–1.2)
BUN BLDV-MCNC: 19 MG/DL (ref 7–22)
CALCIUM SERPL-MCNC: 5.2 MG/DL (ref 8.5–10.5)
CHLORIDE BLD-SCNC: 104 MEQ/L (ref 98–111)
CO2: 25 MEQ/L (ref 23–33)
CREAT SERPL-MCNC: 1.1 MG/DL (ref 0.4–1.2)
EOSINOPHIL # BLD: 2.7 %
EOSINOPHILS ABSOLUTE: 0.1 THOU/MM3 (ref 0–0.4)
ERYTHROCYTE [DISTWIDTH] IN BLOOD BY AUTOMATED COUNT: 15.7 % (ref 11.5–14.5)
ERYTHROCYTE [DISTWIDTH] IN BLOOD BY AUTOMATED COUNT: 52 FL (ref 35–45)
FLU A ANTIGEN: NEGATIVE
FLU B ANTIGEN: NEGATIVE
GFR SERPL CREATININE-BSD FRML MDRD: 55 ML/MIN/1.73M2
GLUCOSE BLD-MCNC: 96 MG/DL (ref 70–108)
HCT VFR BLD CALC: 37.6 % (ref 37–47)
HEMOGLOBIN: 12 GM/DL (ref 12–16)
IMMATURE GRANS (ABS): 0.03 THOU/MM3 (ref 0–0.07)
IMMATURE GRANULOCYTES: 0.6 %
LYMPHOCYTES # BLD: 24.7 %
LYMPHOCYTES ABSOLUTE: 1.2 THOU/MM3 (ref 1–4.8)
MCH RBC QN AUTO: 29.1 PG (ref 26–33)
MCHC RBC AUTO-ENTMCNC: 31.9 GM/DL (ref 32.2–35.5)
MCV RBC AUTO: 91 FL (ref 81–99)
MONOCYTES # BLD: 3.7 %
MONOCYTES ABSOLUTE: 0.2 THOU/MM3 (ref 0.4–1.3)
NUCLEATED RED BLOOD CELLS: 0 /100 WBC
OSMOLALITY CALCULATION: 287.1 MOSMOL/KG (ref 275–300)
PLATELET # BLD: 135 THOU/MM3 (ref 130–400)
PMV BLD AUTO: 12.2 FL (ref 9.4–12.4)
POTASSIUM REFLEX MAGNESIUM: 3.9 MEQ/L (ref 3.5–5.2)
RBC # BLD: 4.13 MILL/MM3 (ref 4.2–5.4)
SARS-COV-2, NAAT: DETECTED
SEG NEUTROPHILS: 67.7 %
SEGMENTED NEUTROPHILS ABSOLUTE COUNT: 3.3 THOU/MM3 (ref 1.8–7.7)
SODIUM BLD-SCNC: 143 MEQ/L (ref 135–145)
TOTAL PROTEIN: 7.2 G/DL (ref 6.1–8)
WBC # BLD: 4.9 THOU/MM3 (ref 4.8–10.8)

## 2021-12-01 PROCEDURE — 80053 COMPREHEN METABOLIC PANEL: CPT

## 2021-12-01 PROCEDURE — 36415 COLL VENOUS BLD VENIPUNCTURE: CPT

## 2021-12-01 PROCEDURE — 85025 COMPLETE CBC W/AUTO DIFF WBC: CPT

## 2021-12-01 PROCEDURE — 93005 ELECTROCARDIOGRAM TRACING: CPT | Performed by: EMERGENCY MEDICINE

## 2021-12-01 PROCEDURE — 87635 SARS-COV-2 COVID-19 AMP PRB: CPT

## 2021-12-01 PROCEDURE — 87804 INFLUENZA ASSAY W/OPTIC: CPT

## 2021-12-01 PROCEDURE — 99284 EMERGENCY DEPT VISIT MOD MDM: CPT

## 2021-12-01 RX ORDER — BENZONATATE 100 MG/1
100 CAPSULE ORAL 3 TIMES DAILY PRN
Status: DISCONTINUED | OUTPATIENT
Start: 2021-12-01 | End: 2021-12-01 | Stop reason: HOSPADM

## 2021-12-01 ASSESSMENT — ENCOUNTER SYMPTOMS
DIARRHEA: 0
ABDOMINAL PAIN: 0
SHORTNESS OF BREATH: 1
RECTAL PAIN: 0
TROUBLE SWALLOWING: 0
COUGH: 1
APNEA: 0
EYE DISCHARGE: 0
VOICE CHANGE: 0
EYE PAIN: 0

## 2021-12-01 NOTE — ED PROVIDER NOTES
ATTENDING NOTE:    I supervised and discussed the history, physical exam and the management of this patient with the resident. I reviewed the resident's note and agree with the documented findings and plan of care. Please see my additional note. Patient presents to the emergency department for cough productive of yellow-orange sputum, has some occasional wheezing and difficulty breathing. She saw her PCP and was placed on a Z-Tutu and steroid. She reports she is on her third dose of Zithromax but was not feeling any better. She works as a home health aide and did a drive-through test for [address] on the 29th. She got her results yesterday that was positive. She came to the emergency department because she wished to have a second opinion as she thought perhaps the test was a false positive. She has no signs of air hunger on exam, is resting comfortably in bed with sats of 98%. Covid test repeated and found to be positive. Patient counseled regarding her test result. She reports a history of chronic hypocalcemia. Her level today is similar to previous test results. She denies any symptoms related symptoms, no cramping, tingling, etc.  She did not take her morning dose of calcium before coming to the ER. She politely declines any IV calcium supplementation here in the ED, stating she will take her typical dose at home and continue close outpatient follow-up through her PCP who manages this for her on a chronic basis. I personally saw and examined the patient. I have reviewed and agree with the resident's findings, including all diagnostic interpretations and treatment plans as written. I was present for the key portion of any procedures performed and the inclusive time noted in any critical care statement.     Electronically verified by Toma Rodriguez MD  12/02/21 8307

## 2021-12-01 NOTE — ED TRIAGE NOTES
Pt presents to ED with cough and SOB. Pt states that she had a upper respiratory infection and was on a zpak. Pt states she did the drive thru COVID test but thinks it was a false positive.

## 2021-12-01 NOTE — ED NOTES
Pt resting in bed with no distress noted. Call light within reach. Door closed for COVID precautions.       Shanelle Forrester RN  12/01/21 1175

## 2021-12-02 ENCOUNTER — CARE COORDINATION (OUTPATIENT)
Dept: CARE COORDINATION | Age: 41
End: 2021-12-02

## 2021-12-02 LAB
EKG ATRIAL RATE: 79 BPM
EKG P AXIS: 47 DEGREES
EKG P-R INTERVAL: 150 MS
EKG Q-T INTERVAL: 378 MS
EKG QRS DURATION: 54 MS
EKG QTC CALCULATION (BAZETT): 433 MS
EKG R AXIS: 90 DEGREES
EKG T AXIS: -9 DEGREES
EKG VENTRICULAR RATE: 79 BPM

## 2021-12-02 PROCEDURE — 93010 ELECTROCARDIOGRAM REPORT: CPT | Performed by: INTERNAL MEDICINE

## 2021-12-02 NOTE — ED PROVIDER NOTES
[place] ENCOUNTER          Pt Name: Osbaldo Dodson  MRN: [id number]  [place] 1980  Date of evaluation: 12/1/2021  Treating Resident Physician: Kathi Miranda DO  Supervising Physician: Dr. Amari Rahman   Patient presents with    Shortness of Breath     History obtained from the patient. HISTORY OF PRESENT ILLNESS    HPI  Osbaldo Dodson is a 39 y.o. female who presents to the emergency department for evaluation of second opinion for outpatient positive covid test  Osbaldo Dodson is a 39 y.o. female who presents to the emergency department for evaluation of cough. Cough started 2-3 weeks ago, didn't go away, recently added 1-2 table spoon of yelliwish-orange sputum. Otherwise, cough is mostly dry. Additionally, patient experiences shortness of breath on daily physical job duties. Patient visited her PCP 3 days ago, zpack was prescribed. Yesterday, patient took [facility] covisd test for her job, and result was texted early this morning as positive. She denies fever, chill, dizziness, chest pain/tightness, abdominal pain, dysuria, diarrhea/constipation. PMHx is known for hypothyroidism due to total thyroidectomy, severe obesity s/p bypass surgery, hypocalcemia. Patient is current smoker, half pack per day past 25 yrs. Denies illicit drug use. REVIEW OF SYSTEMS   Review of Systems   Constitutional: Positive for fatigue. Negative for activity change, appetite change, chills, diaphoresis and fever. HENT: Negative for congestion, ear discharge, hearing loss, sneezing, trouble swallowing and voice change. Eyes: Negative for pain and discharge. Respiratory: Positive for cough and shortness of breath. Negative for apnea. Mostly dry cough, but occasionally yellowish-orange small, 1-2 table spoon sputum. Cardiovascular: Negative for chest pain and palpitations. Gastrointestinal: Negative for abdominal pain, diarrhea and rectal pain. Endocrine: Negative for cold intolerance, heat intolerance, polydipsia and polyphagia. Genitourinary: Negative for dysuria and hematuria. Skin: Positive for pallor. Neurological: Negative for tremors, seizures, facial asymmetry, speech difficulty and headaches. Psychiatric/Behavioral: Negative for agitation, behavioral problems, confusion, decreased concentration, dysphoric mood, self-injury, sleep disturbance and suicidal ideas. The patient is not hyperactive. PAST MEDICAL AND SURGICAL HISTORY     Past Medical History:   Diagnosis Date    Adjustment disorder     Anxiety     GERD (gastroesophageal reflux disease)     Hypothyroidism     Obesity     Seasonal allergies     Thyroid nodule      Past Surgical History:   Procedure Laterality Date    BARIATRIC SURGERY  11/15/2018    Dr Phillip Boast  [phone]    x3    [place]LE[place]    [place]    OTHER SURGICAL HISTORY  11/15/2018    [facility] liver biopsy,extensive complex lysis of adhesions from pelvis and small bowel from omentum -Dr Humera Thomson N/A 9/19/2019    TOTAL THYROIDECTOMY performed by Young Reeder MD at [address]      age 4[address]  2009    [place]OS[facility]  11/15/2018    Dr [facility]   No current facility-administered medications for this encounter. Current Outpatient Medications:     clotrimazole-betamethasone (LOTRISONE) 1-0.05 % cream, Apply topically 2 times daily. , Disp: 15 g, Rfl: 0    calcitRIOL (ROCALTROL) 0.5 MCG capsule, Take 2 capsules by mouth 4 times daily, Disp: 240 capsule, Rfl: 0    ergocalciferol (ERGOCALCIFEROL) 1.25 MG (14300 UT) capsule, Take 1 capsule by mouth once a week, Disp: 4 capsule, Rfl: 0    busPIRone (BUSPAR) 7.5 MG tablet, Take 7.5 mg by mouth as needed, Disp: , Rfl:     calcium citrate (CALCITRATE) 250 MG TABS tablet, Take 4 tablets by mouth 4 times daily, Disp: 480 tablet, Rfl: 0    magnesium oxide (MAG-OX) 400 (241.3 Mg) MG TABS tablet, Take 1 tablet by mouth daily for 7 days, Disp: 7 tablet, Rfl: 0    Loratadine-Pseudoephedrine (CLARITIN-D 24 HOUR PO), Take 1 tablet by mouth daily, Disp: , Rfl:     levothyroxine (SYNTHROID) 200 MCG tablet, Take 200 mcg by mouth Daily, Disp: , Rfl:     ondansetron (ZOFRAN ODT) 4 MG disintegrating tablet, Take 1 tablet by mouth every 8 hours as needed for Nausea, Disp: 20 tablet, Rfl: 0    vitamin B-12 (CYANOCOBALAMIN) 500 MCG tablet, Take 1 tablet by mouth daily, Disp: , Rfl: 6    spironolactone (ALDACTONE) 50 MG tablet, Take 1 tablet by mouth as needed , Disp: , Rfl: 6    furosemide (LASIX) 40 MG tablet, Take 1 tablet by mouth as needed , Disp: , Rfl: 6    estradiol (ESTRACE) 2 MG tablet, Take 1 tablet by mouth daily, Disp: , Rfl: 6    Multiple Vitamin (MULTI VITAMIN DAILY PO), Take by mouth Indications: [place], Disp: , Rfl:       SOCIAL HISTORY     Social History     Social History Narrative    Not on file     Social History     Tobacco Use    Smoking status: Current Every Day Smoker     Packs/day: 1.00    Smokeless tobacco: Never Used   Vaping Use    Vaping Use: Some days    Substances: Occasionally   Substance Use Topics    Alcohol use: Yes     Comment: social holidays     Drug use: Never         ALLERGIES     Allergies   Allergen Reactions    Latex Itching and Rash         FAMILY HISTORY     Family History   Problem Relation Age of Onset    Other Mother         gallbladder disease    Diabetes Father     Hypertension Father     Arthritis Father     Diabetes Paternal Grandfather     Cancer Paternal Grandmother     Obesity Maternal Grandmother     Diabetes Maternal Grandfather          PREVIOUS RECORDS   Previous records reviewed: .         PHYSICAL EXAM     ED Triage Vitals [12/01/21 0740]   BP Temp Temp Source Pulse Resp SpO2 Height Weight   113/83 97.8 °F (36.6 °C) Oral 74 20 98 % 5' 3\" (1.6 m) 275 lb (124.7 kg)     Initial vital signs and nursing assessment reviewed and normal. Body mass index is 48.71 kg/m². Pulsoximetry is normal per my interpretation. Additional Vital Signs:  Vitals:    12/01/21 0955   BP:    Pulse: 87   Resp:    Temp:    SpO2:        Physical Exam  Constitutional:       Appearance: She is obese. Interventions: She is not intubated. HENT:      Head: Atraumatic. Neck:      Vascular: No hepatojugular reflux or JVD. Comments: Well-healed post-op scar lower-anterioir neck noted  Cardiovascular:      Rate and Rhythm: Normal rate and regular rhythm. Heart sounds: No murmur heard. No gallop. Comments: Distant heart sounds   Pulmonary:      Effort: No tachypnea, accessory muscle usage or respiratory distress. She is not intubated. Breath sounds: Normal breath sounds. Musculoskeletal:         General: Normal range of motion. Cervical back: Neck supple. Skin:     General: Skin is dry. Coloration: Skin is pale. Skin is not cyanotic. Neurological:      General: No focal deficit present. Mental Status: She is alert. Psychiatric:         Mood and Affect: Mood is anxious. Behavior: Behavior normal.      Comments: Anxious due to covid test results             MEDICAL DECISION MAKING   Initial Assessment:   1. COVID-19 infection. Plan:    covid rapid test, cbc, bmp, Influenza A/B test   Supportive care.         ED RESULTS   Laboratory results:  Labs Reviewed   COVID-19, RAPID - Abnormal; Notable for the following components:       Result Value    SARS-CoV-2, NAAT DETECTED (*)     All other components within normal limits   CBC WITH AUTO DIFFERENTIAL - Abnormal; Notable for the following components:    RBC 4.13 (*)     MCHC 31.9 (*)     RDW-CV 15.7 (*)     RDW-SD 52.0 (*)     Monocytes Absolute 0.2 (*)     All other components within normal limits   COMPREHENSIVE METABOLIC PANEL W/ REFLEX TO MG FOR LOW K - Abnormal; Notable for the following components:    Calcium 5.2 (*)     All other components within normal limits   GLOMERULAR FILTRATION RATE, ESTIMATED - Abnormal; Notable for the following components:    Est, Glom Filt Rate 55 (*)     All other components within normal limits   RAPID INFLUENZA A/B ANTIGENS   ANION GAP   OSMOLALITY       Radiologic studies results:  No orders to display       ED Medications administered this visit: Medications - No data to display      ED COURSE             MEDICATION CHANGES     Discharge Medication List as of 12/1/2021 10:16 AM            FINAL DISPOSITION     Final diagnoses:   COVID-19   1. COVID-19 infection  -patient has had symptoms about 2 weeks, had positive test on express test location. rapid test positive in ED [facility]. PCP prescribed zpack 2 days ago but patient feels it didn't help.   supportive care, tessalon for cough offered but patient denied. 2. Hypocalciemia. S/p thyroidectomy, on calcium supplement. Known disease, patient is on calcium supplement. She knows the lab results, and she states that she knows when she needs IV calcium. Currently she refused IV calcium and willing to go home, take regular calcium pills and follow up with PCP/ENdocrinologist.  3. Obesity. -known chronic issue, patient gained 20-30 lbs within past year. She is given information and education about this. She verbalizes understanding. Will      4. Active  Tobacco smoker, 0.5-1 ppd, working on quitting.  -patient is educated and informed acout smoking cessation. PCP follow up    Patient staffed with Dr. Daljit Christianson    Condition: condition: good  Dispo: Discharge to home      This transcription was electronically signed.  Parts of this transcriptions may have been dictated by use of voice recognition software and electronically transcribed, and parts may have been transcribed with the assistance of an ED scribe. The transcription may contain errors not detected in proofreading. Please refer to my supervising physician's documentation if my documentation differs.     Electronically Signed: Jorge Farias DO, 12/01/21, 7:06 PM       Jorge Lima DO  Resident  12/01/21 6281

## 2021-12-02 NOTE — CARE COORDINATION
Patient contacted regarding COVID-19 diagnosis. Discussed COVID-19 related testing which was available at this time. Test results were positive. Patient informed of results, if available?  reported patient is COVID-19 positive during f/u call. Ambulatory Care Manager contacted the patient who was not available at the time of my call. Patient's , Esther Rollins, was available and agreeable to speak with ACM for  post discharge assessment. Call within 2 business days of discharge: Yes. Verified name and  with family as identifiers. Provided introduction to self, and explanation of the CTN/ACM role, and reason for call due to risk factors for infection and/or exposure to COVID-19. Symptoms reviewed with , Esther Rollins,  who verbalized the following symptoms: fatigue, shortness of breath, no new symptoms and no worsening symptoms. Due to no new or worsening symptoms encounter was not routed to provider for escalation. Discussed follow-up appointments. If no appointment was previously scheduled, appointment scheduling offered:  educated on the importance of following up with PCP as directed and he denied any need for assistance with scheduling of f/u appointment. [facility] follow up appointment(s): No future appointments. Non-[facility] follow up appointment(s): N/A     Non-face-to-face services provided:  Obtained and reviewed discharge summary and/or continuity of care documents  Education of patient/family/caregiver/guardian to support self-management-COVID-19 education and zone information reviewed with patient's , Esther Rollins. Esther Rollins educated on signs/symptoms to report as well as the importance of early symptom recognition. ACM reviewed need for patient/family to self quaranatine as directed. Advance Care Planning:   Does patient have an Advance Directive:  Not completed d/t inability to speak with patient directly.  .     Educated patient about risk for severe COVID-19 due to risk factors according to CDC guidelines. ACM reviewed discharge instructions, medical action plan and red flag symptoms with the , Evelia Jacques,  who verbalized understanding. Discussed COVID vaccination status: No.  Discussed exposure protocols and quarantine with CDC Guidelines. Evelia Jacques was given an opportunity to verbalize any questions and concerns and agrees to contact ACM or health care provider for questions related to their healthcare. Reviewed and educated , Evelia Jacques,  on any new and changed medications related to discharge diagnosis     Was patient discharged with a pulse oximeter? No     ACM provided contact information. No further follow-up call identified based on severity of symptoms and risk factors. Patient called for covid-19 f/u s/p recent ED visit for c/o SOB. Patient was not available at the time of my call as patient's  reported she is sleeping. ACM was able to speak with patient's , Evelia Jacques. No patient information shared during our call. Evelia Jacques denied any new/change/worsening of symptoms and he denied any questions re: discharge instructions. ACM reviewed COVID-19 education and zone information. Signs/symptoms to call and report were reviewed with Evelia Jacques as well as the importance of early symptom recognition and he verbalized understanding. Covid-19 education was reviewed and  verbalized understanding. Evelia Jacques was reminded to call covid-19 hotline number with any new symptoms or questions/concerns. Evelia Jacques denied any other questions, concerns, or needs.

## 2021-12-11 ENCOUNTER — HOSPITAL ENCOUNTER (EMERGENCY)
Age: 41
Discharge: HOME | End: 2021-12-11
Payer: COMMERCIAL

## 2021-12-11 VITALS
TEMPERATURE: 98 F | SYSTOLIC BLOOD PRESSURE: 115 MMHG | HEART RATE: 81 BPM | RESPIRATION RATE: 18 BRPM | HEIGHT: 63 IN | WEIGHT: 275 LBS | OXYGEN SATURATION: 94 % | DIASTOLIC BLOOD PRESSURE: 83 MMHG | BODY MASS INDEX: 48.73 KG/M2

## 2021-12-11 DIAGNOSIS — Z20.822 LAB TEST NEGATIVE FOR COVID-19 VIRUS: Primary | ICD-10-CM

## 2021-12-11 DIAGNOSIS — Z76.89 RETURN TO WORK EVALUATION: ICD-10-CM

## 2021-12-11 LAB — SARS-COV-2, NAAT: NOT  DETECTED

## 2021-12-11 PROCEDURE — 99282 EMERGENCY DEPT VISIT SF MDM: CPT

## 2021-12-11 PROCEDURE — 87635 SARS-COV-2 COVID-19 AMP PRB: CPT

## 2021-12-11 NOTE — Clinical Note
Bria Lemus was seen and treated in our emergency department on 12/11/2021. She may return to work on 12/12/2021. If you have any questions or concerns, please don't hesitate to call.       Camellia Lennox, PA-C

## 2021-12-12 NOTE — ED PROVIDER NOTES
[facility]  eMERGENCY dEPARTMENT eNCOUnter          CHIEF COMPLAINT       Chief Complaint   Patient presents with    Positive For Covid-19       Nurses Notes reviewed and I agree except as noted inthe HPI. HISTORY OF PRESENT ILLNESS    Edd Schilder is a 39 y.o. female who presents to the Emergency Department for the evaluation of recent Covid and repeat test for return to work. Patient states she had positive Covid test 11 to 12 days ago. States she had symptoms of crackling and wheezing in the lungs which has resolved and her only persistent symptom is improving sense of bad taste. Reports that she did receive the [employer] vaccine in March.  was symptomatic as well and children all tested positive. Patient states that she has had no fever associated with her symptoms and again, symptoms are improving. 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 The HPI was provided by the patient. REVIEW OF SYSTEMS     Review of Systems   All other systems reviewed and are negative. PAST MEDICAL HISTORY    has a past medical history of Adjustment disorder, Anxiety, GERD (gastroesophageal reflux disease), Hypothyroidism, Obesity, Seasonal allergies, and Thyroid nodule. SURGICAL HISTORY      has a past surgical history that includes  section (,,); Cholecystectomy (); Hysterectomy (); Tubal ligation (); Bariatric Surgery (11/15/2018); Upper gastrointestinal endoscopy (11/15/2018); other surgical history (11/15/2018); Tonsillectomy; and Thyroidectomy (N/A, 2019). CURRENT MEDICATIONS       Discharge Medication List as of 2021  6:41 PM      CONTINUE these medications which have NOT CHANGED    Details   clotrimazole-betamethasone (LOTRISONE) 1-0.05 % cream Apply topically 2 times daily. , Disp-15 g, R-0, Print calcitRIOL (ROCALTROL) 0.5 MCG capsule Take 2 capsules by mouth 4 times daily, Disp-240 capsule, R-0Print      ergocalciferol (ERGOCALCIFEROL) 1.25 MG (43254 UT) capsule Take 1 capsule by mouth once a week, Disp-4 capsule, R-0Print      busPIRone (BUSPAR) 7.5 MG tablet Take 7.5 mg by mouth as neededHistorical Med      calcium citrate (CALCITRATE) 250 MG TABS tablet Take 4 tablets by mouth 4 times daily, Disp-480 tablet,R-0Normal      magnesium oxide (MAG-OX) 400 (241.3 Mg) MG TABS tablet Take 1 tablet by mouth daily for 7 days, Disp-7 tablet,R-0Normal      Loratadine-Pseudoephedrine (CLARITIN-D 24 HOUR PO) Take 1 tablet by mouth dailyHistorical Med      levothyroxine (SYNTHROID) 200 MCG tablet Take 200 mcg by mouth DailyHistorical Med      ondansetron (ZOFRAN ODT) 4 MG disintegrating tablet Take 1 tablet by mouth every 8 hours as needed for Nausea, Disp-20 tablet, R-0Print      vitamin B-12 (CYANOCOBALAMIN) 500 MCG tablet Take 1 tablet by mouth daily, R-6Historical Med      spironolactone (ALDACTONE) 50 MG tablet Take 1 tablet by mouth as needed , R-6Historical Med      furosemide (LASIX) 40 MG tablet Take 1 tablet by mouth as needed , R-6Historical Med      estradiol (ESTRACE) 2 MG tablet Take 1 tablet by mouth daily, R-6Historical Med      Multiple Vitamin (MULTI VITAMIN DAILY PO) Take by mouth Indications: flinstonesHistorical Med             ALLERGIES     is allergic to latex. FAMILY HISTORY     She indicated that her mother is alive. She indicated that her father is alive. She indicated that her sister is alive. She indicated that her brother is alive. She indicated that her maternal grandmother is . She indicated that her maternal grandfather is . She indicated that her paternal grandmother is alive. She indicated that her paternal grandfather is .    family history includes Arthritis in her father; Cancer in her paternal grandmother; Diabetes in her father, maternal grandfather, and paternal grandfather; Hypertension in her father; Obesity in her maternal grandmother; Other in her mother. SOCIAL HISTORY      reports that she has been smoking. She has been smoking about 1.00 pack per day. She has never used smokeless tobacco. She reports current alcohol use. She reports that she does not use drugs. PHYSICAL EXAM     INITIAL VITALS:  height is 5' 3\" (1.6 m) and weight is 275 lb (124.7 kg). Her oral temperature is 98 °F (36.7 °C). Her blood pressure is 115/83 and her pulse is 81. Her respiration is 18 and oxygen saturation is 94%. Physical Exam  Vitals reviewed. Constitutional:       Appearance: Normal appearance. HENT:      Head: Normocephalic and atraumatic. Mouth/Throat:      Pharynx: Oropharynx is clear. Eyes:      Conjunctiva/sclera: Conjunctivae normal.   Cardiovascular:      Rate and Rhythm: Normal rate. Pulmonary:      Effort: Pulmonary effort is normal. No respiratory distress. Musculoskeletal:      Cervical back: Normal range of motion. Skin:     General: Skin is warm. Neurological:      Mental Status: She is alert. Psychiatric:         Mood and Affect: Mood normal.         DIFFERENTIAL DIAGNOSIS:   Differential diagnoses are discussed    DIAGNOSTIC RESULTS     EKG: All EKG's are interpreted by the Emergency Department Physician who either signs or Co-signsthis chart in the absence of a cardiologist.          RADIOLOGY: non-plain film images(s) such as CT, Ultrasound and MRI are read by the radiologist.    No orders to display       LABS:      Labs Reviewed   COVID-19, RAPID       EMERGENCY DEPARTMENT COURSE:   Vitals:    Vitals:    12/11/21 1731   BP: 115/83   Pulse: 81   Resp: 18   Temp: 98 °F (36.7 °C)   TempSrc: Oral   SpO2: 94%   Weight: 275 lb (124.7 kg)   Height: 5' 3\" (1.6 m)      10:21 PM EST: The patient was seen and evaluated. Patient presents requesting repeat Covid testing due to recent infection and requirement of her workplace.   She has reassuring vital signs and repeat Covid test today was negative. She has completed her 10-day quarantine, did not have severe disease or any persistent symptoms/fever to warrant prolonging the duration of her quarantine and return to work note was provided. She denied any further needs at this time and was discharged in stable condition. CRITICAL CARE:   None    CONSULTS:  None    PROCEDURES:  None    FINAL IMPRESSION      1. Lab test negative for COVID-19 virus    2.  Return to work evaluation          DISPOSITION/PLAN   Discharge    PATIENT REFERRED TO:  EBEN King CNP  [place] [address] [place] [address]      As needed    [address]  [phone]    If symptoms worsen      DISCHARGEMEDICATIONS:  Discharge Medication List as of 12/11/2021  6:41 PM          (Please note that portions of this note were completedwith a voice recognition program.  Efforts were made to edit the dictations but occasionally words are mis-transcribed.)        Ross Blanco PA-C  12/11/21 6757

## 2022-02-01 ENCOUNTER — HOSPITAL ENCOUNTER (EMERGENCY)
Age: 42
Discharge: HOME | End: 2022-02-01
Attending: EMERGENCY MEDICINE
Payer: COMMERCIAL

## 2022-02-01 ENCOUNTER — APPOINTMENT (OUTPATIENT)
Dept: GENERAL RADIOLOGY | Age: 42
End: 2022-02-01
Payer: COMMERCIAL

## 2022-02-01 VITALS
OXYGEN SATURATION: 96 % | BODY MASS INDEX: 48.71 KG/M2 | DIASTOLIC BLOOD PRESSURE: 96 MMHG | SYSTOLIC BLOOD PRESSURE: 124 MMHG | TEMPERATURE: 97.8 F | RESPIRATION RATE: 18 BRPM | HEART RATE: 65 BPM | WEIGHT: 275 LBS

## 2022-02-01 DIAGNOSIS — E83.51 HYPOCALCEMIA: Primary | ICD-10-CM

## 2022-02-01 DIAGNOSIS — R60.9 DEPENDENT EDEMA: ICD-10-CM

## 2022-02-01 LAB
ALBUMIN SERPL-MCNC: 4.6 G/DL (ref 3.5–5.1)
ALP BLD-CCNC: 110 U/L (ref 38–126)
ALT SERPL-CCNC: 26 U/L (ref 11–66)
ANION GAP SERPL CALCULATED.3IONS-SCNC: 17 MEQ/L (ref 8–16)
AST SERPL-CCNC: 39 U/L (ref 5–40)
BASOPHILS # BLD: 0.8 %
BASOPHILS ABSOLUTE: 0.1 THOU/MM3 (ref 0–0.1)
BILIRUB SERPL-MCNC: 0.6 MG/DL (ref 0.3–1.2)
BUN BLDV-MCNC: 18 MG/DL (ref 7–22)
CALCIUM IONIZED: 0.58 MMOL/L (ref 1.12–1.32)
CALCIUM SERPL-MCNC: 5.7 MG/DL (ref 8.5–10.5)
CHLORIDE BLD-SCNC: 96 MEQ/L (ref 98–111)
CO2: 26 MEQ/L (ref 23–33)
CREAT SERPL-MCNC: 1.3 MG/DL (ref 0.4–1.2)
EOSINOPHIL # BLD: 2 %
EOSINOPHILS ABSOLUTE: 0.1 THOU/MM3 (ref 0–0.4)
ERYTHROCYTE [DISTWIDTH] IN BLOOD BY AUTOMATED COUNT: 16.3 % (ref 11.5–14.5)
ERYTHROCYTE [DISTWIDTH] IN BLOOD BY AUTOMATED COUNT: 55.4 FL (ref 35–45)
GFR SERPL CREATININE-BSD FRML MDRD: 45 ML/MIN/1.73M2
GLUCOSE BLD-MCNC: 95 MG/DL (ref 70–108)
HCT VFR BLD CALC: 40.9 % (ref 37–47)
HEMOGLOBIN: 13 GM/DL (ref 12–16)
IMMATURE GRANS (ABS): 0.02 THOU/MM3 (ref 0–0.07)
IMMATURE GRANULOCYTES: 0.3 %
LYMPHOCYTES # BLD: 28.5 %
LYMPHOCYTES ABSOLUTE: 1.9 THOU/MM3 (ref 1–4.8)
MCH RBC QN AUTO: 29.4 PG (ref 26–33)
MCHC RBC AUTO-ENTMCNC: 31.8 GM/DL (ref 32.2–35.5)
MCV RBC AUTO: 92.5 FL (ref 81–99)
MONOCYTES # BLD: 6.2 %
MONOCYTES ABSOLUTE: 0.4 THOU/MM3 (ref 0.4–1.3)
NUCLEATED RED BLOOD CELLS: 0 /100 WBC
OSMOLALITY CALCULATION: 279.2 MOSMOL/KG (ref 275–300)
PLATELET # BLD: 187 THOU/MM3 (ref 130–400)
PMV BLD AUTO: 12.4 FL (ref 9.4–12.4)
POTASSIUM REFLEX MAGNESIUM: 4.1 MEQ/L (ref 3.5–5.2)
PRO-BNP: 31 PG/ML (ref 0–450)
RBC # BLD: 4.42 MILL/MM3 (ref 4.2–5.4)
SEG NEUTROPHILS: 62.2 %
SEGMENTED NEUTROPHILS ABSOLUTE COUNT: 4 THOU/MM3 (ref 1.8–7.7)
SODIUM BLD-SCNC: 139 MEQ/L (ref 135–145)
TOTAL PROTEIN: 7.7 G/DL (ref 6.1–8)
WBC # BLD: 6.5 THOU/MM3 (ref 4.8–10.8)

## 2022-02-01 PROCEDURE — 85025 COMPLETE CBC W/AUTO DIFF WBC: CPT

## 2022-02-01 PROCEDURE — 93005 ELECTROCARDIOGRAM TRACING: CPT | Performed by: EMERGENCY MEDICINE

## 2022-02-01 PROCEDURE — 82330 ASSAY OF CALCIUM: CPT

## 2022-02-01 PROCEDURE — 83880 ASSAY OF NATRIURETIC PEPTIDE: CPT

## 2022-02-01 PROCEDURE — 96365 THER/PROPH/DIAG IV INF INIT: CPT

## 2022-02-01 PROCEDURE — 96366 THER/PROPH/DIAG IV INF ADDON: CPT

## 2022-02-01 PROCEDURE — 99284 EMERGENCY DEPT VISIT MOD MDM: CPT

## 2022-02-01 PROCEDURE — 71045 X-RAY EXAM CHEST 1 VIEW: CPT

## 2022-02-01 PROCEDURE — 6360000002 HC RX W HCPCS: Performed by: EMERGENCY MEDICINE

## 2022-02-01 PROCEDURE — 80053 COMPREHEN METABOLIC PANEL: CPT

## 2022-02-01 RX ADMIN — CALCIUM GLUCONATE 2000 MG: 20 INJECTION, SOLUTION INTRAVENOUS at 20:01

## 2022-02-01 ASSESSMENT — PAIN DESCRIPTION - LOCATION: LOCATION: CHEST

## 2022-02-01 ASSESSMENT — ENCOUNTER SYMPTOMS
COUGH: 0
VOMITING: 0
SHORTNESS OF BREATH: 0
BACK PAIN: 0
TROUBLE SWALLOWING: 0
NAUSEA: 0
ABDOMINAL PAIN: 0
EYE REDNESS: 0

## 2022-02-01 ASSESSMENT — PAIN DESCRIPTION - PROGRESSION: CLINICAL_PROGRESSION: NOT CHANGED

## 2022-02-01 ASSESSMENT — PAIN DESCRIPTION - FREQUENCY: FREQUENCY: CONTINUOUS

## 2022-02-01 ASSESSMENT — PAIN SCALES - GENERAL: PAINLEVEL_OUTOF10: 2

## 2022-02-01 ASSESSMENT — PAIN DESCRIPTION - DESCRIPTORS: DESCRIPTORS: ACHING

## 2022-02-01 ASSESSMENT — PAIN DESCRIPTION - ONSET: ONSET: ON-GOING

## 2022-02-01 NOTE — ED NOTES
Patient to the ED with complaints of leg swelling over past several days. Patient states that she has low calcium per routine labs. Patient notes that she is having shortness of breath as well. She states that she gets routine calcium infusions for her hypocalcemia. Patient is resting in bed with easy and unlabored respirations. Call light in reach. Side rails up x2. Patient denies further complaints or concerns. Will monitor.         Francia García RN  02/01/22 0880

## 2022-02-02 LAB
EKG ATRIAL RATE: 62 BPM
EKG ATRIAL RATE: 74 BPM
EKG P AXIS: 50 DEGREES
EKG P AXIS: 51 DEGREES
EKG P-R INTERVAL: 132 MS
EKG P-R INTERVAL: 152 MS
EKG Q-T INTERVAL: 358 MS
EKG Q-T INTERVAL: 474 MS
EKG QRS DURATION: 52 MS
EKG QRS DURATION: 64 MS
EKG QTC CALCULATION (BAZETT): 397 MS
EKG QTC CALCULATION (BAZETT): 481 MS
EKG R AXIS: 102 DEGREES
EKG R AXIS: 91 DEGREES
EKG T AXIS: -54 DEGREES
EKG T AXIS: 77 DEGREES
EKG VENTRICULAR RATE: 62 BPM
EKG VENTRICULAR RATE: 74 BPM

## 2022-02-02 NOTE — ED NOTES
Nurse to room for shift report/assessment. Patient resting in bed AAOx4, CCM in place, VSS. Pt states no requests or questions at this time. Call light within reach.       Lasha Richards RN  02/01/22 2745

## 2022-02-02 NOTE — ED PROVIDER NOTES
[address] [facility]"" [address] [address] EMERGENCY DEPT    EMERGENCY MEDICINE     Pt Name: Jose Elias Murillo  MRN: [id number]  [place] 1980  Date of evaluation: 2/1/2022  Provider: Adriana Overton MD,     Mike Saravia       Chief Complaint   Patient presents with    Leg Swelling       HISTORY OF PRESENT ILLNESS    Jose Elias Murillo is a pleasant 39 y.o. female who presents to the emergency department from home for calcium infusion. Patient states that she has a history of low calcium after having a gastric bypass. She states that she does take her oral calcium however sometimes she just does not seem to observatory well. She states she has been here several times over the years for a calcium infusion. She noticed that she was having tingling in both of her arms. She has had some cramping in the hands. She states that her hand spasms when the blood pressure cuff went off. Patient is also concerned about lower leg swelling. She states that she has been avoiding salt however she is noted some increased swelling in her lower extremities. She denies any chest pain or difficulty breathing. She is not wearing any compression socks. Triage notes and Nursing notes were reviewed by myself. Any discrepancies are addressed above.     PAST MEDICAL HISTORY     Past Medical History:   Diagnosis Date    Adjustment disorder     Anxiety     GERD (gastroesophageal reflux disease)     Hypothyroidism     Obesity     Seasonal allergies     Thyroid nodule        SURGICAL HISTORY       Past Surgical History:   Procedure Laterality Date    BARIATRIC SURGERY  11/15/2018    Dr Bentley Potter  [id number]    x3    [place]LE[place]    [place]    OTHER SURGICAL HISTORY  11/15/2018    [facility] liver biopsy,extensive complex lysis of adhesions from pelvis and small bowel from omentum -Dr Wren Nondenominational N/A 9/19/2019    [facility] TUBAL [facility]  2009    [place]-Dr Luisa Baires UPPER GASTROINTESTINAL ENDOSCOPY  11/15/2018    Dr Stephen Onofre       Discharge Medication List as of 2/1/2022 11:31 PM      CONTINUE these medications which have NOT CHANGED    Details   clotrimazole-betamethasone (LOTRISONE) 1-0.05 % cream Apply topically 2 times daily. , Disp-15 g, R-0, Print      calcitRIOL (ROCALTROL) 0.5 MCG capsule Take 2 capsules by mouth 4 times daily, Disp-240 capsule, R-0Print      ergocalciferol (ERGOCALCIFEROL) 1.25 MG (16598 UT) capsule Take 1 capsule by mouth once a week, Disp-4 capsule, R-0Print      busPIRone (BUSPAR) 7.5 MG tablet Take 7.5 mg by mouth as neededHistorical Med      calcium citrate (CALCITRATE) 250 MG TABS tablet Take 4 tablets by mouth 4 times daily, Disp-480 tablet,R-0Normal      magnesium oxide (MAG-OX) 400 (241.3 Mg) MG TABS tablet Take 1 tablet by mouth daily for 7 days, Disp-7 tablet,R-0Normal      Loratadine-Pseudoephedrine (CLARITIN-D 24 HOUR PO) Take 1 tablet by mouth dailyHistorical Med      levothyroxine (SYNTHROID) 200 MCG tablet Take 200 mcg by mouth DailyHistorical Med      ondansetron (ZOFRAN ODT) 4 MG disintegrating tablet Take 1 tablet by mouth every 8 hours as needed for Nausea, Disp-20 tablet, R-0Print      vitamin B-12 (CYANOCOBALAMIN) 500 MCG tablet Take 1 tablet by mouth daily, R-6Historical Med      spironolactone (ALDACTONE) 50 MG tablet Take 1 tablet by mouth as needed , R-6Historical Med      furosemide (LASIX) 40 MG tablet Take 1 tablet by mouth as needed , R-6Historical Med      estradiol (ESTRACE) 2 MG tablet Take 1 tablet by mouth daily, R-6Historical Med      Multiple Vitamin (MULTI VITAMIN DAILY PO) Take by mouth Indications: flinstonesHistorical Med             ALLERGIES     Latex    FAMILY HISTORY       Family History   Problem Relation Age of Onset    Other Mother         gallbladder disease    Diabetes Father Housing in the Last Year: Not on file    Number of Places Lived in the Last Year: Not on file    Unstable Housing in the Last Year: Not on file       REVIEW OF SYSTEMS     Review of Systems   Constitutional: Negative for fatigue and fever. HENT: Negative for congestion and trouble swallowing. Eyes: Negative for redness. Respiratory: Negative for cough and shortness of breath. Cardiovascular: Positive for leg swelling. Negative for chest pain. Gastrointestinal: Negative for abdominal pain, nausea and vomiting. Genitourinary: Negative for dysuria. Musculoskeletal: Negative for back pain. Skin: Negative for rash. Allergic/Immunologic: Negative for immunocompromised state. Neurological: Positive for numbness. Negative for light-headedness. Hematological: Does not bruise/bleed easily. Except as noted above the remainder of the review of systems was reviewed and is. PHYSICAL EXAM    (up to 7 for level 4, 8 or more for level 5)     ED Triage Vitals [02/01/22 1830]   BP Temp Temp src Pulse Resp SpO2 Height Weight   119/82 97.8 °F (36.6 °C) -- 84 11 97 % -- 275 lb (124.7 kg)       Physical Exam  Vitals and nursing note reviewed. Exam conducted with a chaperone present. Constitutional:       General: She is not in acute distress. Appearance: She is normal weight. She is not ill-appearing. HENT:      Head: Normocephalic and atraumatic. Nose: Nose normal. No congestion. Mouth/Throat:      Mouth: Mucous membranes are moist.      Pharynx: Oropharynx is clear. No oropharyngeal exudate or posterior oropharyngeal erythema. Eyes:      Extraocular Movements: Extraocular movements intact. Pupils: Pupils are equal, round, and reactive to light. Cardiovascular:      Rate and Rhythm: Normal rate and regular rhythm. Pulses: Normal pulses. Dorsalis pedis pulses are 2+ on the right side and 2+ on the left side.         Posterior tibial pulses are 2+ on the right side and 2+ on the left side. Heart sounds: S1 normal and S2 normal. No murmur heard. No friction rub. Pulmonary:      Effort: Pulmonary effort is normal. No respiratory distress. Breath sounds: Normal breath sounds. No wheezing. Abdominal:      General: Abdomen is flat. There is no distension. Palpations: Abdomen is soft. Tenderness: There is no abdominal tenderness. There is no guarding or rebound. Musculoskeletal:         General: Normal range of motion. Right lower leg: Edema present. Left lower leg: Edema present. Skin:     General: Skin is warm and dry. Capillary Refill: Capillary refill takes less than 2 seconds. Neurological:      General: No focal deficit present. Mental Status: She is alert and oriented to person, place, and time. Cranial Nerves: No dysarthria or facial asymmetry. Motor: No weakness or tremor. Comments: Carpopedal spasm occurred with blood pressure cuff. Paresthesias bilaterally in the upper extremities         DIAGNOSTIC RESULTS     EKG:(none if blank)  All EKG's are interpreted by [facility] Physician who either signs or Co-signs this chart in the absence of a cardiologist.        RADIOLOGY: (none if blank)   Interpretation per the Radiologistbelow, if available at the time of this note:    XR CHEST PORTABLE   Final Result   1. Moderate cardiomegaly. 2. No acute findings. No infiltrates or effusions are seen. **This report has been created using voice recognition software. It may contain minor errors which are inherent in voice recognition technology. **      Final report electronically signed by Dr. Ada Morales on 2/1/2022 6:59 PM          LABS:  Labs Reviewed   CBC WITH AUTO DIFFERENTIAL - Abnormal; Notable for the following components:       Result Value    MCHC 31.8 (*)     RDW-CV 16.3 (*)     RDW-SD 55.4 (*)     All other components within normal limits   COMPREHENSIVE METABOLIC PANEL W/ REFLEX TO MG FOR LOW K - Abnormal; Notable for the following components:    CREATININE 1.3 (*)     Chloride 96 (*)     Calcium 5.7 (*)     All other components within normal limits   CALCIUM, IONIZED - Abnormal; Notable for the following components:    Calcium, Ion 0.58 (*)     All other components within normal limits   ANION GAP - Abnormal; Notable for the following components:    Anion Gap 17.0 (*)     All other components within normal limits   GLOMERULAR FILTRATION RATE, ESTIMATED - Abnormal; Notable for the following components:    Est, Glom Filt Rate 45 (*)     All other components within normal limits   BRAIN NATRIURETIC PEPTIDE   OSMOLALITY       All other labs were within normal range or not returned as of this dictation. Please note, any cultures that may have been sent were not resulted at the time of this patient visit. EMERGENCY DEPARTMENT COURSE andMedical Decision Making:     MDM  Number of Diagnoses or Management Options  Dependent edema  Hypocalcemia  Diagnosis management comments: 24-year-old female presents emergency room presents with paraesthesias and muscle cramps. She is known for having low calcium. Patient is also concerned about her lower peripheral edema. Differential includes hypocalcemia, other electrolyte abnormality, peripheral edema, CHF. Will evaluate with CBC, CMP, ionized calcium, BNP, chest x-ray. Will give 2 g of calcium gluconate here. /  ED Course as of 02/02/22 0232 Tue Feb 01, 2022 2124 Calcium is low as expected. Creatinine is within her baseline. Patient is feeling better as the calcium is being replaced. I spoke with her about peripheral edema and recommended that she wear compression socks. Patient is agreeable with plan of care. [DD]      ED Course User Index  [DD] Russ Garzon MD         The patient was evaluated during the global COVID-19 pandemic, and that diagnosis was considered upon their initial presentation.  Their evaluation, treatment and testing was consistent with current guidelines for patients who present with complaints or symptoms that may be related to COVID-19. Strict returnprecautions and follow up instructions were discussed with the patient with which the patient agrees        ED Medications administered this visit:    Medications   calcium gluconate 2-0.675 GM/100ML-% 2,000 mg IVPB (0 mg IntraVENous Stopped 2/1/22 3690)         Procedures: (None if blank)       CLINICAL       1. Hypocalcemia    2.  Dependent edema          DISPOSITION/PLAN   DISPOSITION Decision To Discharge 02/01/2022 11:33:42 PM      PATIENT REFERRED TO:  [facility]  [phone]    Schedule an appointment as soon as possible for a visit   If symptoms worsen      DISCHARGE MEDICATIONS:  Discharge Medication List as of 2/1/2022 11:31 PM                 (Please note that portions of this note were completed with a voice recognition program.  Efforts were made to edit the dictations but occasionallywords are mis-transcribed.)      Electronically signed by Leatha Erwin MD on 2/1/22 at 7:40 PM EST    Attending Physician, Emergency Department       Russ Garzon MD  02/02/22 1169

## 2022-02-02 NOTE — ED NOTES
Pt resting in bed with CCM in place. Pt VSS, AAOx4, NADN. Calcium infusing. Call light within reach.       Molly Argueta RN  02/01/22 2102

## 2022-02-02 NOTE — ED NOTES
Pt is being DC home in stable condition. DC instructions and follow up reviewed, patient verbalized understanding with no question for this RN.      Rell Cheatham RN  02/01/22 3840

## 2022-02-23 ENCOUNTER — HOSPITAL ENCOUNTER (OUTPATIENT)
Age: 42
Setting detail: SPECIMEN
Discharge: HOME | End: 2022-02-23

## 2022-02-23 LAB
ABSOLUTE EOS #: 0.12 K/UL (ref 0–0.44)
ABSOLUTE IMMATURE GRANULOCYTE: 0.03 K/UL (ref 0–0.3)
ABSOLUTE LYMPH #: 1.2 K/UL (ref 1.1–3.7)
ABSOLUTE MONO #: 0.34 K/UL (ref 0.1–1.2)
ALBUMIN SERPL-MCNC: 4.3 G/DL (ref 3.5–5.2)
ALBUMIN/GLOBULIN RATIO: 1.9 (ref 1–2.5)
ALP BLD-CCNC: 99 U/L (ref 35–104)
ALT SERPL-CCNC: 25 U/L (ref 5–33)
ANION GAP SERPL CALCULATED.3IONS-SCNC: 21 MMOL/L (ref 9–17)
AST SERPL-CCNC: 44 U/L
BASOPHILS # BLD: 1 % (ref 0–2)
BASOPHILS ABSOLUTE: 0.07 K/UL (ref 0–0.2)
BILIRUB SERPL-MCNC: 0.47 MG/DL (ref 0.3–1.2)
BUN BLDV-MCNC: 15 MG/DL (ref 6–20)
CALCIUM IONIZED: 0.63 MMOL/L (ref 1.13–1.33)
CALCIUM SERPL-MCNC: 5.2 MG/DL (ref 8.6–10.4)
CHLORIDE BLD-SCNC: 98 MMOL/L (ref 98–107)
CO2: 25 MMOL/L (ref 20–31)
CREAT SERPL-MCNC: 1.1 MG/DL (ref 0.5–0.9)
EOSINOPHILS RELATIVE PERCENT: 2 % (ref 1–4)
GFR AFRICAN AMERICAN: >60 ML/MIN
GFR NON-AFRICAN AMERICAN: 55 ML/MIN
GFR SERPL CREATININE-BSD FRML MDRD: ABNORMAL ML/MIN/{1.73_M2}
GLUCOSE BLD-MCNC: 82 MG/DL (ref 70–99)
HCT VFR BLD CALC: 36.5 % (ref 36.3–47.1)
HEMOGLOBIN: 12 G/DL (ref 11.9–15.1)
IMMATURE GRANULOCYTES: 1 %
LYMPHOCYTES # BLD: 21 % (ref 24–43)
MCH RBC QN AUTO: 30.1 PG (ref 25.2–33.5)
MCHC RBC AUTO-ENTMCNC: 32.9 G/DL (ref 28.4–34.8)
MCV RBC AUTO: 91.5 FL (ref 82.6–102.9)
MONOCYTES # BLD: 6 % (ref 3–12)
NRBC AUTOMATED: 0 PER 100 WBC
PDW BLD-RTO: 15.6 % (ref 11.8–14.4)
PLATELET # BLD: ABNORMAL K/UL (ref 138–453)
PLATELET, FLUORESCENCE: 159 K/UL (ref 138–453)
PLATELET, IMMATURE FRACTION: 8.2 % (ref 1.1–10.3)
POTASSIUM SERPL-SCNC: 4.7 MMOL/L (ref 3.7–5.3)
PTH INTACT: 7.1 PG/ML (ref 15–65)
RBC # BLD: 3.99 M/UL (ref 3.95–5.11)
RBC # BLD: ABNORMAL 10*6/UL
SEG NEUTROPHILS: 70 % (ref 36–65)
SEGMENTED NEUTROPHILS ABSOLUTE COUNT: 4.02 K/UL (ref 1.5–8.1)
SODIUM BLD-SCNC: 144 MMOL/L (ref 135–144)
T3 TOTAL: <10 NG/DL (ref 60–181)
TOTAL PROTEIN: 6.6 G/DL (ref 6.4–8.3)
TSH SERPL DL<=0.05 MIU/L-ACNC: 74.59 MIU/L (ref 0.3–5)
WBC # BLD: 5.8 K/UL (ref 3.5–11.3)

## 2022-02-24 LAB — THYROXINE, FREE: 0.2 NG/DL (ref 0.93–1.7)

## 2022-03-21 ENCOUNTER — HOSPITAL ENCOUNTER (EMERGENCY)
Age: 42
Discharge: HOME | End: 2022-03-22
Payer: COMMERCIAL

## 2022-03-21 DIAGNOSIS — E83.51 HYPOCALCEMIA: Primary | ICD-10-CM

## 2022-03-21 LAB
ALBUMIN SERPL-MCNC: 4.2 G/DL (ref 3.5–5.1)
ALP BLD-CCNC: 126 U/L (ref 38–126)
ALT SERPL-CCNC: 11 U/L (ref 11–66)
ANION GAP SERPL CALCULATED.3IONS-SCNC: 17 MEQ/L (ref 8–16)
AST SERPL-CCNC: 21 U/L (ref 5–40)
BASOPHILS # BLD: 0.6 %
BASOPHILS ABSOLUTE: 0 THOU/MM3 (ref 0–0.1)
BILIRUB SERPL-MCNC: 0.4 MG/DL (ref 0.3–1.2)
BUN BLDV-MCNC: 16 MG/DL (ref 7–22)
CALCIUM IONIZED: 0.58 MMOL/L (ref 1.12–1.32)
CALCIUM SERPL-MCNC: 5.4 MG/DL (ref 8.5–10.5)
CHLORIDE BLD-SCNC: 99 MEQ/L (ref 98–111)
CO2: 25 MEQ/L (ref 23–33)
CREAT SERPL-MCNC: 0.8 MG/DL (ref 0.4–1.2)
EOSINOPHIL # BLD: 2.4 %
EOSINOPHILS ABSOLUTE: 0.2 THOU/MM3 (ref 0–0.4)
ERYTHROCYTE [DISTWIDTH] IN BLOOD BY AUTOMATED COUNT: 14.3 % (ref 11.5–14.5)
ERYTHROCYTE [DISTWIDTH] IN BLOOD BY AUTOMATED COUNT: 47.3 FL (ref 35–45)
GFR SERPL CREATININE-BSD FRML MDRD: 79 ML/MIN/1.73M2
GLUCOSE BLD-MCNC: 95 MG/DL (ref 70–108)
HCT VFR BLD CALC: 40.2 % (ref 37–47)
HEMOGLOBIN: 12.7 GM/DL (ref 12–16)
IMMATURE GRANS (ABS): 0.06 THOU/MM3 (ref 0–0.07)
IMMATURE GRANULOCYTES: 0.8 %
LYMPHOCYTES # BLD: 22.1 %
LYMPHOCYTES ABSOLUTE: 1.6 THOU/MM3 (ref 1–4.8)
MCH RBC QN AUTO: 28.5 PG (ref 26–33)
MCHC RBC AUTO-ENTMCNC: 31.6 GM/DL (ref 32.2–35.5)
MCV RBC AUTO: 90.1 FL (ref 81–99)
MONOCYTES # BLD: 6.3 %
MONOCYTES ABSOLUTE: 0.5 THOU/MM3 (ref 0.4–1.3)
NUCLEATED RED BLOOD CELLS: 0 /100 WBC
OSMOLALITY CALCULATION: 282.3 MOSMOL/KG (ref 275–300)
PLATELET # BLD: 275 THOU/MM3 (ref 130–400)
PMV BLD AUTO: 10.7 FL (ref 9.4–12.4)
POTASSIUM REFLEX MAGNESIUM: 4 MEQ/L (ref 3.5–5.2)
RBC # BLD: 4.46 MILL/MM3 (ref 4.2–5.4)
SEG NEUTROPHILS: 67.8 %
SEGMENTED NEUTROPHILS ABSOLUTE COUNT: 4.9 THOU/MM3 (ref 1.8–7.7)
SODIUM BLD-SCNC: 141 MEQ/L (ref 135–145)
TOTAL PROTEIN: 7.6 G/DL (ref 6.1–8)
WBC # BLD: 7.2 THOU/MM3 (ref 4.8–10.8)

## 2022-03-21 PROCEDURE — 6360000002 HC RX W HCPCS: Performed by: NURSE PRACTITIONER

## 2022-03-21 PROCEDURE — 99284 EMERGENCY DEPT VISIT MOD MDM: CPT

## 2022-03-21 PROCEDURE — 85025 COMPLETE CBC W/AUTO DIFF WBC: CPT

## 2022-03-21 PROCEDURE — 80053 COMPREHEN METABOLIC PANEL: CPT

## 2022-03-21 PROCEDURE — 82330 ASSAY OF CALCIUM: CPT

## 2022-03-21 PROCEDURE — 96366 THER/PROPH/DIAG IV INF ADDON: CPT

## 2022-03-21 PROCEDURE — 96365 THER/PROPH/DIAG IV INF INIT: CPT

## 2022-03-21 RX ADMIN — CALCIUM GLUCONATE 2000 MG: 20 INJECTION, SOLUTION INTRAVENOUS at 21:56

## 2022-03-21 RX ADMIN — CALCIUM GLUCONATE 2000 MG: 20 INJECTION, SOLUTION INTRAVENOUS at 19:57

## 2022-03-21 ASSESSMENT — ENCOUNTER SYMPTOMS
ABDOMINAL PAIN: 0
NAUSEA: 0
SHORTNESS OF BREATH: 0
ABDOMINAL DISTENTION: 0
VOMITING: 0
CHEST TIGHTNESS: 0
DIARRHEA: 0
COLOR CHANGE: 0

## 2022-03-21 NOTE — ED PROVIDER NOTES
[facility] Emergency Department    CHIEF COMPLAINT       Chief Complaint   Patient presents with    Other     hypoCal       Nurses Notes reviewed and I agree except as noted in the HPI. HISTORY OF PRESENT ILLNESS    Jimmey Lesch is a 39 y.o. female who presents to the ED for evaluation of hypocalcemia. Patient notes symptoms began this afternoon. She notes history of hypocalcemia in the past.  She has had charley horses, muscle spasms, and electric shocklike pain rating up from her left leg. She notes a generalized heaviness. She notes twitching to the face. She notes she has had these symptoms before, she has a history of a thyroid removal and ever since then had the symptoms. She also notes a history of gastric bypass so unable to absorb calcium adequately. She denies any recent nausea vomiting or diarrhea. She denies any loss of consciousness. Denies any double vision. HPI was provided by the patient. REVIEW OF SYSTEMS     Review of Systems   Constitutional: Positive for activity change and fatigue. Negative for chills and fever. Eyes: Negative for visual disturbance. Respiratory: Negative for chest tightness and shortness of breath. Cardiovascular: Negative for chest pain. Gastrointestinal: Negative for abdominal distention, abdominal pain, diarrhea, nausea and vomiting. Genitourinary: Negative for decreased urine volume and difficulty urinating. Musculoskeletal: Positive for myalgias. Negative for arthralgias. Skin: Negative for color change and rash. Allergic/Immunologic: Negative for immunocompromised state. Neurological: Positive for weakness and numbness. Negative for dizziness, tremors, speech difficulty, light-headedness and headaches. Hematological: Does not bruise/bleed easily. Psychiatric/Behavioral: Negative for agitation, behavioral problems and confusion.         PAST MEDICAL HISTORY     Past Medical History:   Diagnosis Date    Adjustment disorder     Anxiety     GERD (gastroesophageal reflux disease)     Hypothyroidism     Obesity     Seasonal allergies     Thyroid nodule        SURGICALHISTORY      has a past surgical history that includes  section (,,); Cholecystectomy (); Hysterectomy (); Tubal ligation (); Bariatric Surgery (11/15/2018); Upper gastrointestinal endoscopy (11/15/2018); other surgical history (11/15/2018); Tonsillectomy; and Thyroidectomy (N/A, 2019). CURRENT MEDICATIONS       Previous Medications    BUSPIRONE (BUSPAR) 7.5 MG TABLET    Take 7.5 mg by mouth as needed    CALCITRIOL (ROCALTROL) 0.5 MCG CAPSULE    Take 2 capsules by mouth 4 times daily    CALCIUM CITRATE (CALCITRATE) 250 MG TABS TABLET    Take 4 tablets by mouth 4 times daily    CLOTRIMAZOLE-BETAMETHASONE (LOTRISONE) 1-0.05 % CREAM    Apply topically 2 times daily. ERGOCALCIFEROL (ERGOCALCIFEROL) 1.25 MG (63511 UT) CAPSULE    Take 1 capsule by mouth once a week    ESTRADIOL (ESTRACE) 2 MG TABLET    Take 1 tablet by mouth daily    FUROSEMIDE (LASIX) 40 MG TABLET    Take 1 tablet by mouth as needed     LEVOTHYROXINE (SYNTHROID) 200 MCG TABLET    Take 200 mcg by mouth Daily    LORATADINE-PSEUDOEPHEDRINE (CLARITIN-D 24 HOUR PO)    Take 1 tablet by mouth daily    MAGNESIUM OXIDE (MAG-OX) 400 (241.3 MG) MG TABS TABLET    Take 1 tablet by mouth daily for 7 days    MULTIPLE VITAMIN (MULTI VITAMIN DAILY PO)    Take by mouth Indications: flinstones    ONDANSETRON (ZOFRAN ODT) 4 MG DISINTEGRATING TABLET    Take 1 tablet by mouth every 8 hours as needed for Nausea    SPIRONOLACTONE (ALDACTONE) 50 MG TABLET    Take 1 tablet by mouth as needed     VITAMIN B-12 (CYANOCOBALAMIN) 500 MCG TABLET    Take 1 tablet by mouth daily       ALLERGIES     is allergic to latex. FAMILY HISTORY     She indicated that her mother is alive. She indicated that her father is alive. She indicated that her sister is alive.  She indicated that her brother is alive. She indicated that her maternal grandmother is . She indicated that her maternal grandfather is . She indicated that her paternal grandmother is alive. She indicated that her paternal grandfather is . family history includes Arthritis in her father; Cancer in her paternal grandmother; Diabetes in her father, maternal grandfather, and paternal grandfather; Hypertension in her father; Obesity in her maternal grandmother; Other in her mother. SOCIAL HISTORY       Social History     Socioeconomic History    Marital status:      Spouse name: Not on file    Number of children: Not on file    Years of education: Not on file    Highest education level: Not on file   Occupational History    Not on file   Tobacco Use    Smoking status: Current Every Day Smoker     Packs/day: 1.00    Smokeless tobacco: Never Used   Vaping Use    Vaping Use: Some days    Substances: Occasionally   Substance and Sexual Activity    Alcohol use: Yes     Comment: social holidays     Drug use: Never    Sexual activity: Yes     Partners: Male   Other Topics Concern    Not on file   Social History Narrative    Not on file     Social Determinants of Health     Financial Resource Strain:     Difficulty of Paying Living Expenses: Not on file   Food Insecurity:     Worried About Running Out of Food in the Last Year: Not on file    Audrey of Food in the Last Year: Not on file   Transportation Needs:     Lack of Transportation (Medical): Not on file    Lack of Transportation (Non-Medical):  Not on file   Physical Activity:     Days of Exercise per Week: Not on file    Minutes of Exercise per Session: Not on file   Stress:     Feeling of Stress : Not on file   Social Connections:     Frequency of Communication with Friends and Family: Not on file    Frequency of Social Gatherings with Friends and Family: Not on file    Attends Jewish Services: Not on file   [employer] of Clubs or Organizations: Not on file    Attends Club or Organization Meetings: Not on file    Marital Status: Not on file   Intimate Partner Violence:     Fear of Current or Ex-Partner: Not on file    Emotionally Abused: Not on file    Physically Abused: Not on file    Sexually Abused: Not on file   Housing Stability:     Unable to Pay for Housing in the Last Year: Not on file    Number of [place] in the Last Year: Not on file    Unstable Housing in the Last Year: Not on file       PHYSICAL EXAM     INITIAL VITALS:  oral temperature is 97.9 °F (36.6 °C). Her pulse is 78. Her respiration is 19 and oxygen saturation is 97%. Physical Exam  Vitals and nursing note reviewed. Constitutional:       Appearance: Normal appearance. She is well-developed. HENT:      Head: Normocephalic. Mouth/Throat:      Pharynx: Uvula midline. Eyes:      Conjunctiva/sclera: Conjunctivae normal.   Cardiovascular:      Rate and Rhythm: Normal rate and regular rhythm. Heart sounds: Normal heart sounds, S1 normal and S2 normal.   Pulmonary:      Effort: Pulmonary effort is normal. No respiratory distress. Breath sounds: Normal breath sounds. Chest:      Chest wall: No tenderness. Abdominal:      General: Bowel sounds are normal. There is no distension. Palpations: Abdomen is soft. Tenderness: There is no abdominal tenderness. Musculoskeletal:         General: Normal range of motion. Cervical back: Normal range of motion and neck supple. Lymphadenopathy:      Cervical: No cervical adenopathy. Skin:     General: Skin is warm and dry. Neurological:      Mental Status: She is alert and oriented to person, place, and time. GCS: GCS eye subscore is 4. GCS verbal subscore is 5. GCS motor subscore is 6. Comments: Noted positive Chvostek sign   Psychiatric:         Speech: Speech normal.         Behavior: Behavior normal.         Thought Content:  Thought content normal.         DIFFERENTIAL DIAGNOSIS:   Hypocalcemia, electrolyte abnormality,  DIAGNOSTIC RESULTS     EKG: All EKG's are interpreted by the Emergency Department Physician who eithersigns or Co-signs this chart in the absence of a cardiologist.    Patient refused. RADIOLOGY: non-plainfilm images(s) such as CT, Ultrasound and MRI are read by the radiologist.  Plain radiographic images are visualized and preliminarily interpreted by the emergency physician unless otherwise stated below. No orders to display         LABS:   Labs Reviewed   CALCIUM, IONIZED - Abnormal; Notable for the following components:       Result Value    Calcium, Ion 0.58 (*)     All other components within normal limits   CBC WITH AUTO DIFFERENTIAL - Abnormal; Notable for the following components:    MCHC 31.6 (*)     RDW-SD 47.3 (*)     All other components within normal limits   COMPREHENSIVE METABOLIC PANEL W/ REFLEX TO MG FOR LOW K       EMERGENCY DEPARTMENT COURSE:   Vitals:    Vitals:    03/21/22 1828 03/21/22 1830   Pulse:  78   Resp:  19   Temp: 97.9 °F (36.6 °C)    TempSrc: Oral    SpO2:  97%     MDM    Patient was seen and evaluated in the emergency department, patient appears to be in no acute distress, vital signs were reviewed, no significant findings noted. Physical exam was completed, positive Chvostek sign. Ionized calcium was 0.58. Discussed the case with Dr. Lucina Patel who recommends 4 g of potassium gluconate. Patient treated with 4 g of calcium gluconate. Shift ended before the infusion had completed. Patient was signed out to [facility], CNP. Medications   calcium gluconate 2-0.675 GM/100ML-% 2,000 mg IVPB (has no administration in time range)       Patient was seenindependently by myself. The patient's final impression and disposition and plan was determined by myself. CRITICAL CARE:   None    CONSULTS:  None    PROCEDURES:  None    FINAL IMPRESSION   No diagnosis found.       DISPOSITION/PLAN   Please see the note of [employer], CNP.    PATIENT REFERREDTO:  No follow-up provider specified. DISCHARGE MEDICATIONS:  New Prescriptions    No medications on file       (Please note that portions of this note were completed with a voice recognition program.  Efforts were made to edit the dictations but occasionally words are mis-transcribed.)      Provider:  I personally performed the services described in the documentation,reviewed and edited the documentation which was dictated to the scribe in my presence, and it accurately records my words and actions.     Seth Almaguer, OREN 03/21/22 7:24 PM    Isidra Zavaleta Counts, APRN - CNP        [employer], APRN - CNP  03/21/22 Omar Almaguer, APRN - CNP  04/20/22 1524

## 2022-03-21 NOTE — ED NOTES
Report received from [facility]. Patient resting in bed. Respirations easy and unlabored. No distress noted. Denies further needs at this time. Call light within reach.        Miriam Cee RN  03/21/22 4123

## 2022-03-21 NOTE — ED NOTES
Pt to ED via intake, pt reports they are here because their calcium is low. Pt reports they have an issue with this often. Pt reports they are compliant with their home meds. Today pt reports feeling \"lamberto horses\" in their muscles. Pt also reports feeling so tired they had to leave work. Pt refusing to let us use BP cuff at this time.       Dat Aguillon, TOBIAS  03/21/22 5281

## 2022-03-22 VITALS
TEMPERATURE: 97.9 F | SYSTOLIC BLOOD PRESSURE: 123 MMHG | RESPIRATION RATE: 16 BRPM | OXYGEN SATURATION: 92 % | HEART RATE: 69 BPM | DIASTOLIC BLOOD PRESSURE: 79 MMHG

## 2022-03-22 NOTE — ED NOTES
Patient resting in bed. Respirations easy and unlabored. No distress noted. Call light within reach.        Holger Snow RN  03/21/22 2002

## 2022-03-22 NOTE — ED NOTES
Discharge instructions reviewed with pt. Pt verbalized understanding and denied further questions. LDA removed. Pt discharged with all belongings.       Tete Rose RN  03/22/22 8957

## 2022-03-22 NOTE — ED NOTES
Patient resting in bed. Respirations easy and unlabored. No distress noted. Denies further needs at this time. Call light within reach.        Kieran Stewart RN  03/21/22 7664

## 2022-03-22 NOTE — ED NOTES
Patient resting in bed with eyes closed. Respirations easy and unlabored. No distress noted. Call light within reach.        Christa Christianson RN  03/21/22 1550

## 2022-04-12 ENCOUNTER — HOSPITAL ENCOUNTER (EMERGENCY)
Age: 42
Discharge: HOME | End: 2022-04-12
Payer: COMMERCIAL

## 2022-04-12 VITALS
RESPIRATION RATE: 18 BRPM | OXYGEN SATURATION: 98 % | SYSTOLIC BLOOD PRESSURE: 119 MMHG | WEIGHT: 260 LBS | TEMPERATURE: 97.6 F | DIASTOLIC BLOOD PRESSURE: 74 MMHG | BODY MASS INDEX: 46.07 KG/M2 | HEART RATE: 94 BPM | HEIGHT: 63 IN

## 2022-04-12 DIAGNOSIS — L60.0 INGROWN TOENAIL OF LEFT FOOT WITH INFECTION: Primary | ICD-10-CM

## 2022-04-12 PROCEDURE — 99283 EMERGENCY DEPT VISIT LOW MDM: CPT

## 2022-04-12 RX ORDER — AMOXICILLIN AND CLAVULANATE POTASSIUM 875; 125 MG/1; MG/1
1 TABLET, FILM COATED ORAL 2 TIMES DAILY
Qty: 20 TABLET | Refills: 0 | Status: SHIPPED | OUTPATIENT
Start: 2022-04-12 | End: 2022-04-22

## 2022-04-12 ASSESSMENT — PAIN - FUNCTIONAL ASSESSMENT: PAIN_FUNCTIONAL_ASSESSMENT: 0-10

## 2022-04-12 ASSESSMENT — PAIN DESCRIPTION - LOCATION: LOCATION: TOE (COMMENT WHICH ONE)

## 2022-04-12 ASSESSMENT — PAIN DESCRIPTION - ORIENTATION: ORIENTATION: RIGHT

## 2022-04-12 ASSESSMENT — PAIN DESCRIPTION - PAIN TYPE: TYPE: ACUTE PAIN

## 2022-04-12 ASSESSMENT — PAIN SCALES - GENERAL: PAINLEVEL_OUTOF10: 4

## 2022-04-12 NOTE — LETTER
[facility] Emergency Department   [place], [address]          PROOF OF PRESENCE      To Whom It May Concern:    Guanakito Misty was present in the Emergency Department at [facility] Emergency Department on 4/12/2022.                                      Sincerely,

## 2022-04-13 NOTE — ED TRIAGE NOTES
Pt to ED through triage with c/c ingrown right big toenail. Pt states she has been \"dealing with this for about a week\". Pt rates pain 4/10. Vitals stable.

## 2022-04-15 ASSESSMENT — ENCOUNTER SYMPTOMS
COUGH: 0
ABDOMINAL PAIN: 0
VOMITING: 0
EYE REDNESS: 0
RHINORRHEA: 0
BACK PAIN: 0
NAUSEA: 0
CHEST TIGHTNESS: 0

## 2022-04-15 NOTE — ED PROVIDER NOTES
[facility] Emergency Department    CHIEF COMPLAINT       Chief Complaint   Patient presents with    Toe Pain     Right big       Nurses Notes reviewed and I agree except as noted in the HPI. HISTORY OF PRESENT ILLNESS    Jeffery Cota jackson 39 y.o. female who presents to the ED for evaluation of toe pain. She has an ingrown toenail. She is asking for it to be removed. It has been present x weeks. HPI was provided by the patient    REVIEW OF SYSTEMS     Review of Systems   Constitutional: Negative for chills, fatigue and fever. HENT: Negative for congestion, ear discharge, ear pain, postnasal drip and rhinorrhea. Eyes: Negative for redness. Respiratory: Negative for cough and chest tightness. Cardiovascular: Negative for chest pain and leg swelling. Gastrointestinal: Negative for abdominal pain, nausea and vomiting. Genitourinary: Negative for difficulty urinating, dysuria, enuresis, flank pain and hematuria. Musculoskeletal: Negative for back pain and joint swelling. Skin: Positive for wound. Negative for rash. Neurological: Negative for dizziness, light-headedness, numbness and headaches. Psychiatric/Behavioral: Negative for agitation, behavioral problems and confusion. All other systems negative except as noted. PAST MEDICAL HISTORY     Past Medical History:   Diagnosis Date    Adjustment disorder     Anxiety     GERD (gastroesophageal reflux disease)     Hypothyroidism     Obesity     Seasonal allergies     Thyroid nodule        SURGICALHISTORY      has a past surgical history that includes  section (,,); Cholecystectomy (); Hysterectomy (); Tubal ligation (); Bariatric Surgery (11/15/2018); Upper gastrointestinal endoscopy (11/15/2018); other surgical history (11/15/2018); Tonsillectomy; and Thyroidectomy (N/A, 2019).     CURRENT MEDICATIONS       Discharge Medication List as of 2022  9:05 PM      CONTINUE these medications which have NOT CHANGED    Details   clotrimazole-betamethasone (LOTRISONE) 1-0.05 % cream Apply topically 2 times daily. , Disp-15 g, R-0, Print      calcitRIOL (ROCALTROL) 0.5 MCG capsule Take 2 capsules by mouth 4 times daily, Disp-240 capsule, R-0Print      ergocalciferol (ERGOCALCIFEROL) 1.25 MG (55187 UT) capsule Take 1 capsule by mouth once a week, Disp-4 capsule, R-0Print      busPIRone (BUSPAR) 7.5 MG tablet Take 7.5 mg by mouth as neededHistorical Med      calcium citrate (CALCITRATE) 250 MG TABS tablet Take 4 tablets by mouth 4 times daily, Disp-480 tablet,R-0Normal      magnesium oxide (MAG-OX) 400 (241.3 Mg) MG TABS tablet Take 1 tablet by mouth daily for 7 days, Disp-7 tablet,R-0Normal      Loratadine-Pseudoephedrine (CLARITIN-D 24 HOUR PO) Take 1 tablet by mouth dailyHistorical Med      levothyroxine (SYNTHROID) 200 MCG tablet Take 200 mcg by mouth DailyHistorical Med      ondansetron (ZOFRAN ODT) 4 MG disintegrating tablet Take 1 tablet by mouth every 8 hours as needed for Nausea, Disp-20 tablet, R-0Print      vitamin B-12 (CYANOCOBALAMIN) 500 MCG tablet Take 1 tablet by mouth daily, R-6Historical Med      spironolactone (ALDACTONE) 50 MG tablet Take 1 tablet by mouth as needed , R-6Historical Med      furosemide (LASIX) 40 MG tablet Take 1 tablet by mouth as needed , R-6Historical Med      estradiol (ESTRACE) 2 MG tablet Take 1 tablet by mouth daily, R-6Historical Med      Multiple Vitamin (MULTI VITAMIN DAILY PO) Take by mouth Indications: flinstonesHistorical Med             ALLERGIES     is allergic to latex. FAMILY HISTORY     She indicated that her mother is alive. She indicated that her father is alive. She indicated that her sister is alive. She indicated that her brother is alive. She indicated that her maternal grandmother is . She indicated that her maternal grandfather is . She indicated that her paternal grandmother is alive.  She indicated that her Emotionally Abused: Not on file    Physically Abused: Not on file    Sexually Abused: Not on file   Housing Stability:     Unable to Pay for Housing in the Last Year: Not on file    Number of Places Lived in the Last Year: Not on file    Unstable Housing in the Last Year: Not on file       PHYSICAL EXAM     INITIAL VITALS:  height is 5' 3\" (1.6 m) and weight is 260 lb (117.9 kg). Her oral temperature is 97.6 °F (36.4 °C). Her blood pressure is 119/74 and her pulse is 94. Her respiration is 18 and oxygen saturation is 98%. Physical Exam  Constitutional:       General: She is not in acute distress. Appearance: She is well-developed. She is not diaphoretic. HENT:      Head: Normocephalic and atraumatic. Nose: Nose normal.      Mouth/Throat:      Mouth: Mucous membranes are moist.      Pharynx: Oropharynx is clear. Eyes:      Conjunctiva/sclera: Conjunctivae normal.   Cardiovascular:      Pulses: Normal pulses. Pulmonary:      Effort: Pulmonary effort is normal.   Musculoskeletal:         General: No deformity. Normal range of motion. Cervical back: Normal range of motion. Feet:    Feet:      Left foot:      Toenail Condition: Left toenails are ingrown. Skin:     General: Skin is warm and dry. Capillary Refill: Capillary refill takes less than 2 seconds. Neurological:      General: No focal deficit present. Mental Status: She is alert and oriented to person, place, and time.    Psychiatric:         Mood and Affect: Mood normal.         Behavior: Behavior normal.         DIFFERENTIAL DIAGNOSIS:   Ingrown toenail    DIAGNOSTIC RESULTS     EKG: All EKG's are interpreted by the Emergency Department Physician who eithersigns or Co-signs this chart in the absence of a cardiologist.        RADIOLOGY: non-plainfilm images(s) such as CT, Ultrasound and MRI are read by the radiologist.  Plain radiographic images are visualized and preliminarily interpreted by the emergency physician unless otherwise stated below. No orders to display         LABS:   Labs Reviewed - No data to display    EMERGENCY DEPARTMENT COURSE:   Vitals:    Vitals:    04/12/22 2014   BP: 119/74   Pulse: 94   Resp: 18   Temp: 97.6 °F (36.4 °C)   TempSrc: Oral   SpO2: 98%   Weight: 260 lb (117.9 kg)   Height: 5' 3\" (1.6 m)                                 Internal Administration   First Dose COVID-19, J&J, PF, 0.5 mL  03/09/2021   Second Dose           Last COVID Lab SARS-CoV-2, NAAT (no units)   Date Value   12/11/2021 NOT  DETECTED            MDM    Patient was seen in the ER for an ingrown toenail. She is asking for it to be removed. Explained to patient that this is not an urgent/emergent procedure so would defer to podiatry/pcp. Advised her to follow up and provided referral to podiatry. Medications - No data to display    Please note that the patient was evaluated during a pandemic. All efforts were made for HIPPA compliance as well as provision of appropriate care. Patient was seen independently by myself. The patient's final impression and disposition and plan was determined by myself. Strict return precautions and follow up instructions were discussed with the patient prior to discharge, with which the patient agrees. Physical assessment findings, diagnostic testing(s) if applicable, and vital signs reviewed with patient/patient representative. Questions answered. Medications asdirected, including OTC medications for supportive care. Education provided on medications. Differential diagnosis(s) discussed with patient/patient representative. Home care/self care instructions reviewed withpatient/patient representative. Patient is to follow-up with family care provider in 2-3 days if no improvement. Patient is to go to the emergency department if symptoms worsen. Patient/patient representative isaware of care plan, questions answered, verbalizes understanding and is in agreement. CRITICAL CARE:   None    CONSULTS:  None    PROCEDURES:  None    FINAL IMPRESSION     1.  Ingrown toenail of left foot with infection          DISPOSITION/PLAN   DISPOSITION Decision To Discharge 04/12/2022 09:03:52 PM      PATIENT REFERREDTO:  EBEN Worthy CNP  [place]  [phone]    Schedule an appointment as soon as possible for a visit in 2 days  For follow up    Jessica Thornton, [address] [place] [address]  [phone]    Call in 1 day  For follow up      [address]:  Discharge Medication List as of 4/12/2022  9:05 PM      START taking these medications    Details   amoxicillin-clavulanate (AUGMENTIN) 875-125 MG per tablet Take 1 tablet by mouth 2 times daily for 10 days, Disp-20 tablet, R-0Normal             (Please note that portions of this note were completed with a voice recognition program.  Efforts were made to edit the dictations but occasionally words are mis-transcribed.)         EBEN York CNP, APRN - CNP  04/15/22 0451

## 2022-04-18 NOTE — ED PROVIDER NOTES
[address]     Pt Name: Abel Barnett  MRN: [id number]  [place] 1980  Date of evaluation: 4/18/22        Mid-level provider Note:    I have personally performed and/or participated in the history, exam and medical decision making and agree with all pertinent clinical information as noted by the previous provider. I have also reviewed and agree with the past medical, family and social history unless otherwise noted. I have personally performed a face to face diagnostic evaluation on this patient. I have reviewed the previous provider's findings and agree. Evaluation:  I assumed care of this patient from [address]. She finished her infusions. She declined admission. Discharged home in stable condition. No results found. DIAGNOSIS  1.  Hypocalcemia           DISPOSITION/PLAN  PATIENT REFERRED TO:  EBEN Garcia CNP 38  [phone]    Schedule an appointment as soon as possible for a visit in 2 days  For follow up    DISCHARGE MEDICATIONS:  Discharge Medication List as of 3/22/2022  1:09 AM            EBEN Bosch CNP, APRN - CNP  04/18/22 3792

## 2022-04-24 ENCOUNTER — HOSPITAL ENCOUNTER (EMERGENCY)
Age: 42
Discharge: HOME | End: 2022-04-25
Attending: EMERGENCY MEDICINE
Payer: COMMERCIAL

## 2022-04-24 DIAGNOSIS — E83.51 HYPOCALCEMIA: Primary | ICD-10-CM

## 2022-04-24 LAB
ANION GAP SERPL CALCULATED.3IONS-SCNC: 13 MEQ/L (ref 8–16)
BASOPHILS # BLD: 0.8 %
BASOPHILS ABSOLUTE: 0.1 THOU/MM3 (ref 0–0.1)
BUN BLDV-MCNC: 22 MG/DL (ref 7–22)
CALCIUM SERPL-MCNC: 5.6 MG/DL (ref 8.5–10.5)
CHLORIDE BLD-SCNC: 101 MEQ/L (ref 98–111)
CO2: 27 MEQ/L (ref 23–33)
CREAT SERPL-MCNC: 1 MG/DL (ref 0.4–1.2)
EOSINOPHIL # BLD: 4.8 %
EOSINOPHILS ABSOLUTE: 0.4 THOU/MM3 (ref 0–0.4)
ERYTHROCYTE [DISTWIDTH] IN BLOOD BY AUTOMATED COUNT: 14.7 % (ref 11.5–14.5)
ERYTHROCYTE [DISTWIDTH] IN BLOOD BY AUTOMATED COUNT: 47 FL (ref 35–45)
GFR SERPL CREATININE-BSD FRML MDRD: 61 ML/MIN/1.73M2
GLUCOSE BLD-MCNC: 82 MG/DL (ref 70–108)
HCT VFR BLD CALC: 42.3 % (ref 37–47)
HEMOGLOBIN: 13.5 GM/DL (ref 12–16)
IMMATURE GRANS (ABS): 0.03 THOU/MM3 (ref 0–0.07)
IMMATURE GRANULOCYTES: 0.4 %
LYMPHOCYTES # BLD: 27.7 %
LYMPHOCYTES ABSOLUTE: 2.1 THOU/MM3 (ref 1–4.8)
MAGNESIUM: 2 MG/DL (ref 1.6–2.4)
MCH RBC QN AUTO: 27.8 PG (ref 26–33)
MCHC RBC AUTO-ENTMCNC: 31.9 GM/DL (ref 32.2–35.5)
MCV RBC AUTO: 87 FL (ref 81–99)
MONOCYTES # BLD: 6.2 %
MONOCYTES ABSOLUTE: 0.5 THOU/MM3 (ref 0.4–1.3)
NUCLEATED RED BLOOD CELLS: 0 /100 WBC
OSMOLALITY CALCULATION: 283.7 MOSMOL/KG (ref 275–300)
PLATELET # BLD: 227 THOU/MM3 (ref 130–400)
PMV BLD AUTO: 11.4 FL (ref 9.4–12.4)
POTASSIUM REFLEX MAGNESIUM: 4 MEQ/L (ref 3.5–5.2)
RBC # BLD: 4.86 MILL/MM3 (ref 4.2–5.4)
SEG NEUTROPHILS: 60.1 %
SEGMENTED NEUTROPHILS ABSOLUTE COUNT: 4.6 THOU/MM3 (ref 1.8–7.7)
SODIUM BLD-SCNC: 141 MEQ/L (ref 135–145)
WBC # BLD: 7.7 THOU/MM3 (ref 4.8–10.8)

## 2022-04-24 PROCEDURE — 99284 EMERGENCY DEPT VISIT MOD MDM: CPT

## 2022-04-24 PROCEDURE — 6360000002 HC RX W HCPCS: Performed by: STUDENT IN AN ORGANIZED HEALTH CARE EDUCATION/TRAINING PROGRAM

## 2022-04-24 PROCEDURE — 80048 BASIC METABOLIC PNL TOTAL CA: CPT

## 2022-04-24 PROCEDURE — 85025 COMPLETE CBC W/AUTO DIFF WBC: CPT

## 2022-04-24 PROCEDURE — 96366 THER/PROPH/DIAG IV INF ADDON: CPT

## 2022-04-24 PROCEDURE — 93005 ELECTROCARDIOGRAM TRACING: CPT | Performed by: STUDENT IN AN ORGANIZED HEALTH CARE EDUCATION/TRAINING PROGRAM

## 2022-04-24 PROCEDURE — 96365 THER/PROPH/DIAG IV INF INIT: CPT

## 2022-04-24 PROCEDURE — 6370000000 HC RX 637 (ALT 250 FOR IP): Performed by: STUDENT IN AN ORGANIZED HEALTH CARE EDUCATION/TRAINING PROGRAM

## 2022-04-24 PROCEDURE — 83735 ASSAY OF MAGNESIUM: CPT

## 2022-04-24 RX ORDER — CALCIUM GLUCONATE 20 MG/ML
2000 INJECTION, SOLUTION INTRAVENOUS
Status: COMPLETED | OUTPATIENT
Start: 2022-04-24 | End: 2022-04-25

## 2022-04-24 RX ORDER — CALCIUM CARBONATE 200(500)MG
1000 TABLET,CHEWABLE ORAL ONCE
Status: COMPLETED | OUTPATIENT
Start: 2022-04-24 | End: 2022-04-24

## 2022-04-24 RX ADMIN — CALCIUM GLUCONATE 2000 MG: 20 INJECTION, SOLUTION INTRAVENOUS at 22:45

## 2022-04-24 RX ADMIN — ANTACID TABLETS 1000 MG: 500 TABLET, CHEWABLE ORAL at 21:44

## 2022-04-24 ASSESSMENT — ENCOUNTER SYMPTOMS
BACK PAIN: 0
VOMITING: 0
SORE THROAT: 0
SHORTNESS OF BREATH: 0
COUGH: 0
SINUS PAIN: 0
EYE REDNESS: 0
NAUSEA: 0
ABDOMINAL PAIN: 0
DIARRHEA: 0
RHINORRHEA: 0

## 2022-04-24 ASSESSMENT — PAIN - FUNCTIONAL ASSESSMENT
PAIN_FUNCTIONAL_ASSESSMENT: NONE - DENIES PAIN

## 2022-04-24 NOTE — LETTER
[facility]  [address] [place] [address]  Phone: [phone]               April 25, 2022    Patient: Truong Fix   YOB: 1980   Date of Visit: 4/24/2022       To Whom It May Concern:    Adalid Napoles was seen and treated in our emergency department on 4/24/2022. She may return to work on 4/25/2022.       Sincerely,       Olaf Adams         Signature:__________________________________

## 2022-04-25 VITALS
HEIGHT: 63 IN | TEMPERATURE: 98.3 F | DIASTOLIC BLOOD PRESSURE: 71 MMHG | WEIGHT: 260 LBS | OXYGEN SATURATION: 98 % | HEART RATE: 67 BPM | BODY MASS INDEX: 46.07 KG/M2 | RESPIRATION RATE: 17 BRPM | SYSTOLIC BLOOD PRESSURE: 115 MMHG

## 2022-04-25 LAB
EKG ATRIAL RATE: 69 BPM
EKG ATRIAL RATE: 71 BPM
EKG P AXIS: 52 DEGREES
EKG P AXIS: 56 DEGREES
EKG P-R INTERVAL: 146 MS
EKG P-R INTERVAL: 148 MS
EKG Q-T INTERVAL: 470 MS
EKG Q-T INTERVAL: 478 MS
EKG QRS DURATION: 66 MS
EKG QRS DURATION: 66 MS
EKG QTC CALCULATION (BAZETT): 503 MS
EKG QTC CALCULATION (BAZETT): 519 MS
EKG R AXIS: 61 DEGREES
EKG R AXIS: 63 DEGREES
EKG T AXIS: 31 DEGREES
EKG T AXIS: 33 DEGREES
EKG VENTRICULAR RATE: 69 BPM
EKG VENTRICULAR RATE: 71 BPM

## 2022-04-25 PROCEDURE — 6360000002 HC RX W HCPCS: Performed by: STUDENT IN AN ORGANIZED HEALTH CARE EDUCATION/TRAINING PROGRAM

## 2022-04-25 PROCEDURE — 93010 ELECTROCARDIOGRAM REPORT: CPT | Performed by: INTERNAL MEDICINE

## 2022-04-25 RX ADMIN — CALCIUM GLUCONATE 2000 MG: 20 INJECTION, SOLUTION INTRAVENOUS at 01:17

## 2022-04-25 NOTE — ED PROVIDER NOTES
following components:    Est, Glom Filt Rate 61 (*)     All other components within normal limits   MAGNESIUM   ANION GAP   OSMOLALITY         Final diagnoses:   Hypocalcemia   . I have seen this patient with the resident Dr. Greene Has and agree with his assessment and plan.       Kimberli Frost,   04/25/22 5941

## 2022-04-25 NOTE — ED PROVIDER NOTES
[facility]          Pt Name: Diane Stallworth  MRN: [id number]  [place] 1980  Date of evaluation: 4/24/2022  Treating Resident Physician: Renetta Contreras MD  Supervising Physician: Dr Aldo Galarza       Chief Complaint   Patient presents with    Other     Calcium injection      History obtained from the patient. HISTORY OF PRESENT ILLNESS    HPI  Diane Stallworth is a 39 y.o. female with past medical history of anxiety, thyroid nodule, hypothyroidism, hypocalcemia who presents to the emergency department for evaluation of general body weakness, generalized heaviness she describes. She states this is similar to when she has her episodes of significant hypocalcemia requires infusions. She has a history of Adam-en-Y gastric bypass causing malabsorption. She takes multiple supplementations but feels that lately her calcium levels have been running low. She denies any fever, chills, chest pain, nausea, vomiting, diarrhea, abdominal pain. The patient has no other acute complaints at this time. REVIEW OF SYSTEMS   Review of Systems   Constitutional: Positive for fatigue. Negative for chills and fever. HENT: Negative for rhinorrhea, sinus pain and sore throat. Eyes: Negative for redness. Respiratory: Negative for cough and shortness of breath. Cardiovascular: Negative for chest pain. Gastrointestinal: Negative for abdominal pain, diarrhea, nausea and vomiting. Genitourinary: Negative for dysuria. Musculoskeletal: Negative for back pain. Skin: Negative for rash. Neurological: Negative for light-headedness and headaches. Psychiatric/Behavioral: Negative for agitation.          PAST MEDICAL AND SURGICAL HISTORY     Past Medical History:   Diagnosis Date    Adjustment disorder     Anxiety     GERD (gastroesophageal reflux disease)     Hypothyroidism     Obesity     Seasonal allergies     Thyroid nodule      Past Surgical History:   Procedure Laterality Date    BARIATRIC SURGERY  11/15/2018    Dr Lauren Pederson  [phone]    x3    CHOLECYSTECTOMY  2009    [place]CTOM[place]  2017    [place]    OTHER SURGICAL HISTORY  11/15/2018    [place] liver biopsy,extensive complex lysis of adhesions from pelvis and small bowel from omentum -Dr Tammi Hernandez N/A 9/19/2019    TOTAL THYROIDECTOMY performed by Sienna Mills MD at [facility]      age [address]  2009    [place]-Dr David Van UPPER GASTROINTESTINAL ENDOSCOPY  11/15/2018    Dr Dionicio Wynn     Current Facility-Administered Medications:     calcium gluconate 4,000 mg in dextrose 5 % 100 mL IVPB, 4,000 mg, IntraVENous, Once, Renetta Contreras MD    Current Outpatient Medications:     clotrimazole-betamethasone (LOTRISONE) 1-0.05 % cream, Apply topically 2 times daily. , Disp: 15 g, Rfl: 0    calcitRIOL (ROCALTROL) 0.5 MCG capsule, Take 2 capsules by mouth 4 times daily, Disp: 240 capsule, Rfl: 0    ergocalciferol (ERGOCALCIFEROL) 1.25 MG (98008 UT) capsule, Take 1 capsule by mouth once a week, Disp: 4 capsule, Rfl: 0    busPIRone (BUSPAR) 7.5 MG tablet, Take 7.5 mg by mouth as needed, Disp: , Rfl:     calcium citrate (CALCITRATE) 250 MG TABS tablet, Take 4 tablets by mouth 4 times daily, Disp: 480 tablet, Rfl: 0    magnesium oxide (MAG-OX) 400 (241.3 Mg) MG TABS tablet, Take 1 tablet by mouth daily for 7 days, Disp: 7 tablet, Rfl: 0    Loratadine-Pseudoephedrine (CLARITIN-D 24 HOUR PO), Take 1 tablet by mouth daily, Disp: , Rfl:     levothyroxine (SYNTHROID) 200 MCG tablet, Take 200 mcg by mouth Daily, Disp: , Rfl:     ondansetron (ZOFRAN ODT) 4 MG disintegrating tablet, Take 1 tablet by mouth every 8 hours as needed for Nausea, Disp: 20 tablet, Rfl: 0    vitamin B-12 (CYANOCOBALAMIN) 500 MCG tablet, Take 1 tablet by mouth daily, Disp: , Rfl: 6    spironolactone (ALDACTONE) 50 MG tablet, Take 1 tablet by mouth as needed , Disp: , Rfl: 6    furosemide (LASIX) 40 MG tablet, Take 1 tablet by mouth as needed , Disp: , Rfl: 6    estradiol (ESTRACE) 2 MG tablet, Take 1 tablet by mouth daily, Disp: , Rfl: 6    Multiple Vitamin (MULTI VITAMIN DAILY PO), Take by mouth Indications: flinstones, Disp: , Rfl:       SOCIAL HISTORY     Social History     Social History Narrative    Not on file     Social History     Tobacco Use    Smoking status: Current Every Day Smoker     Packs/day: 1.00    Smokeless tobacco: Never Used   Vaping Use    Vaping Use: Some days    Substances: Occasionally   Substance Use Topics    Alcohol use: Yes     Comment: social holidays     Drug use: Never         ALLERGIES     Allergies   Allergen Reactions    Latex Itching and Rash         FAMILY HISTORY     Family History   Problem Relation Age of Onset    Other Mother         gallbladder disease    Diabetes Father     Hypertension Father     Arthritis Father     Diabetes Paternal Grandfather     Cancer Paternal Grandmother     Obesity Maternal Grandmother     Diabetes Maternal Grandfather          PREVIOUS RECORDS   Previous records reviewed: Patient was seen on 4/12/2022 for ingrown toenail. PHYSICAL EXAM     ED Triage Vitals [04/24/22 2037]   BP Temp Temp Source Pulse Resp SpO2 Height Weight   127/86 98.3 °F (36.8 °C) Oral 71 18 97 % 5' 3\" (1.6 m) 260 lb (117.9 kg)     Initial vital signs and nursing assessment reviewed and normal. Pulsoximetry is normal per my interpretation. Additional Vital Signs:  Vitals:    04/24/22 2141   BP: 112/66   Pulse: 73   Resp: 18   Temp:    SpO2: 95%       Physical Exam  Vitals and nursing note reviewed. Constitutional:       Appearance: She is obese. She is not toxic-appearing. HENT:      Head: Normocephalic and atraumatic.       Right Ear: External ear normal.      Left Ear: External ear normal.      Nose: Nose normal.      Mouth/Throat:      Mouth: Mucous membranes are moist.      Pharynx: Oropharynx is clear. Eyes:      General: No scleral icterus. Conjunctiva/sclera: Conjunctivae normal.   Cardiovascular:      Rate and Rhythm: Normal rate and regular rhythm. Pulses: Normal pulses. Heart sounds: Normal heart sounds. Pulmonary:      Effort: Pulmonary effort is normal. No respiratory distress. Breath sounds: Normal breath sounds. Abdominal:      General: Abdomen is flat. There is no distension. Palpations: Abdomen is soft. Tenderness: There is no abdominal tenderness. There is no guarding or rebound. Musculoskeletal:         General: Normal range of motion. Cervical back: Normal range of motion and neck supple. No rigidity. No muscular tenderness. Comments: Positive spasming with blood pressure cuff inflation on bilateral arms. ,  Positive for facial twitching with tapping. Lymphadenopathy:      Cervical: No cervical adenopathy. Skin:     General: Skin is warm and dry. Capillary Refill: Capillary refill takes less than 2 seconds. Coloration: Skin is not jaundiced. Neurological:      General: No focal deficit present. Mental Status: She is alert and oriented to person, place, and time. Psychiatric:         Mood and Affect: Mood normal.         Behavior: Behavior normal.             MEDICAL DECISION MAKING   Initial Assessment: This is a 28-year-old female with past medical history of Adam-en-Y gastric bypass surgery with past medical history of hypocalcemia requiring infusions who presents with generalized fatigue and heaviness that she describes as similar to her prior hypocalcemic episodes. She takes multiple supplementations at home however feels that lately she has had increasing heaviness that is similar to his prior episodes.   Denies any fever, chills, nausea, vomiting, abdominal pain, chest pain    Differential Diagnosis Included but not limited to: Metabolic derangement, electrolyte derangement    MDM:   Patient's calcium level significantly low, will replete her calcium with calcium gluconate and Tums. She will be discharged follow-up with her primary care physician as well as her endocrinologist.        ED RESULTS   Laboratory results:  Labs Reviewed   CBC WITH AUTO DIFFERENTIAL - Abnormal; Notable for the following components:       Result Value    MCHC 31.9 (*)     RDW-CV 14.7 (*)     RDW-SD 47.0 (*)     All other components within normal limits   BASIC METABOLIC PANEL W/ REFLEX TO MG FOR LOW K - Abnormal; Notable for the following components:    Calcium 5.6 (*)     All other components within normal limits   GLOMERULAR FILTRATION RATE, ESTIMATED - Abnormal; Notable for the following components:    Est, Glom Filt Rate 61 (*)     All other components within normal limits   MAGNESIUM   ANION GAP   OSMOLALITY       Radiologic studies results:  No orders to display       ED Medications administered this visit:   Medications   calcium gluconate 4,000 mg in dextrose 5 % 100 mL IVPB (has no administration in time range)   calcium carbonate (TUMS) chewable tablet 1,000 mg (1,000 mg Oral Given 4/24/22 2144)         ED COURSE     ED Course as of 04/24/22 2227   Sun Apr 24, 2022   2219 CALCIUM, SERUM, 163987(!!): 5.6  Calcium gluconate ordered [AL]   2220 WBC: 7.7 [AL]   2220 Hemoglobin Quant: 13.5 [AL]   2220 Hematocrit: 42.3 [AL]   2220 Platelet Count: 273 [AL]   2220 Creatinine: 1.0 [AL]      ED Course User Index  [AL] Charlette Toledo MD     Strict return precautions and follow up instructions were discussed with the patient prior to discharge, with which the patient agrees. MEDICATION CHANGES     New Prescriptions    No medications on file         FINAL DISPOSITION     Final diagnoses:   Hypocalcemia     Condition: condition: fair  Dispo: Discharge to home      This transcription was electronically signed.  Parts of this transcriptions may have been dictated by use of voice recognition software and electronically transcribed, and parts may have been transcribed with the assistance of an ED scribe. The transcription may contain errors not detected in proofreading. Please refer to my supervising physician's documentation if my documentation differs.     Electronically Signed: Mariana Gallego MD, 04/24/22, 10:27 PM         Mariana Gallego MD  Resident  04/24/22 3278

## 2022-04-25 NOTE — ED NOTES
Patient resting in bed. Respirations easy and unlabored. No distress noted. Call light within reach.        Chapo Pollock RN  04/25/22 4853

## 2022-04-25 NOTE — ED NOTES
Patient resting in bed. Respirations easy and unlabored. No distress noted. Call light within reach.        Gomez Hogan RN  04/25/22 6260

## 2022-05-02 ENCOUNTER — HOSPITAL ENCOUNTER (EMERGENCY)
Age: 42
Discharge: HOME | End: 2022-05-03
Payer: COMMERCIAL

## 2022-05-02 ENCOUNTER — APPOINTMENT (OUTPATIENT)
Dept: CT IMAGING | Age: 42
End: 2022-05-02
Payer: COMMERCIAL

## 2022-05-02 DIAGNOSIS — E83.51 HYPOCALCEMIA: Primary | ICD-10-CM

## 2022-05-02 DIAGNOSIS — G43.809 OTHER MIGRAINE WITHOUT STATUS MIGRAINOSUS, NOT INTRACTABLE: ICD-10-CM

## 2022-05-02 LAB
ANION GAP SERPL CALCULATED.3IONS-SCNC: 15 MEQ/L (ref 8–16)
BASOPHILS # BLD: 0.6 %
BASOPHILS ABSOLUTE: 0 THOU/MM3 (ref 0–0.1)
BUN BLDV-MCNC: 17 MG/DL (ref 7–22)
CALCIUM IONIZED: 0.66 MMOL/L (ref 1.12–1.32)
CALCIUM SERPL-MCNC: 5.6 MG/DL (ref 8.5–10.5)
CHLORIDE BLD-SCNC: 100 MEQ/L (ref 98–111)
CO2: 26 MEQ/L (ref 23–33)
CREAT SERPL-MCNC: 0.9 MG/DL (ref 0.4–1.2)
EOSINOPHIL # BLD: 6.1 %
EOSINOPHILS ABSOLUTE: 0.5 THOU/MM3 (ref 0–0.4)
ERYTHROCYTE [DISTWIDTH] IN BLOOD BY AUTOMATED COUNT: 15 % (ref 11.5–14.5)
ERYTHROCYTE [DISTWIDTH] IN BLOOD BY AUTOMATED COUNT: 47.4 FL (ref 35–45)
GFR SERPL CREATININE-BSD FRML MDRD: 69 ML/MIN/1.73M2
GLUCOSE BLD-MCNC: 71 MG/DL (ref 70–108)
HCT VFR BLD CALC: 45 % (ref 37–47)
HEMOGLOBIN: 14.5 GM/DL (ref 12–16)
IMMATURE GRANS (ABS): 0.02 THOU/MM3 (ref 0–0.07)
IMMATURE GRANULOCYTES: 0.3 %
LYMPHOCYTES # BLD: 27.2 %
LYMPHOCYTES ABSOLUTE: 2.1 THOU/MM3 (ref 1–4.8)
MAGNESIUM: 1.8 MG/DL (ref 1.6–2.4)
MCH RBC QN AUTO: 27.9 PG (ref 26–33)
MCHC RBC AUTO-ENTMCNC: 32.2 GM/DL (ref 32.2–35.5)
MCV RBC AUTO: 86.7 FL (ref 81–99)
MONOCYTES # BLD: 6.7 %
MONOCYTES ABSOLUTE: 0.5 THOU/MM3 (ref 0.4–1.3)
NUCLEATED RED BLOOD CELLS: 0 /100 WBC
OSMOLALITY CALCULATION: 281.3 MOSMOL/KG (ref 275–300)
PLATELET # BLD: 250 THOU/MM3 (ref 130–400)
PMV BLD AUTO: 11.4 FL (ref 9.4–12.4)
POTASSIUM SERPL-SCNC: 4.2 MEQ/L (ref 3.5–5.2)
RBC # BLD: 5.19 MILL/MM3 (ref 4.2–5.4)
SEG NEUTROPHILS: 59.1 %
SEGMENTED NEUTROPHILS ABSOLUTE COUNT: 4.7 THOU/MM3 (ref 1.8–7.7)
SODIUM BLD-SCNC: 141 MEQ/L (ref 135–145)
WBC # BLD: 7.9 THOU/MM3 (ref 4.8–10.8)

## 2022-05-02 PROCEDURE — 96366 THER/PROPH/DIAG IV INF ADDON: CPT

## 2022-05-02 PROCEDURE — 96365 THER/PROPH/DIAG IV INF INIT: CPT

## 2022-05-02 PROCEDURE — 70450 CT HEAD/BRAIN W/O DYE: CPT

## 2022-05-02 PROCEDURE — 36415 COLL VENOUS BLD VENIPUNCTURE: CPT

## 2022-05-02 PROCEDURE — 85025 COMPLETE CBC W/AUTO DIFF WBC: CPT

## 2022-05-02 PROCEDURE — 6360000002 HC RX W HCPCS: Performed by: NURSE PRACTITIONER

## 2022-05-02 PROCEDURE — 83735 ASSAY OF MAGNESIUM: CPT

## 2022-05-02 PROCEDURE — 96375 TX/PRO/DX INJ NEW DRUG ADDON: CPT

## 2022-05-02 PROCEDURE — 80048 BASIC METABOLIC PNL TOTAL CA: CPT

## 2022-05-02 PROCEDURE — 96368 THER/DIAG CONCURRENT INF: CPT

## 2022-05-02 PROCEDURE — 2580000003 HC RX 258: Performed by: NURSE PRACTITIONER

## 2022-05-02 PROCEDURE — 99284 EMERGENCY DEPT VISIT MOD MDM: CPT

## 2022-05-02 PROCEDURE — 82330 ASSAY OF CALCIUM: CPT

## 2022-05-02 RX ORDER — 0.9 % SODIUM CHLORIDE 0.9 %
1000 INTRAVENOUS SOLUTION INTRAVENOUS ONCE
Status: COMPLETED | OUTPATIENT
Start: 2022-05-02 | End: 2022-05-02

## 2022-05-02 RX ORDER — MAGNESIUM SULFATE IN WATER 40 MG/ML
2000 INJECTION, SOLUTION INTRAVENOUS ONCE
Status: COMPLETED | OUTPATIENT
Start: 2022-05-02 | End: 2022-05-03

## 2022-05-02 RX ORDER — DIPHENHYDRAMINE HYDROCHLORIDE 50 MG/ML
25 INJECTION INTRAMUSCULAR; INTRAVENOUS ONCE
Status: COMPLETED | OUTPATIENT
Start: 2022-05-02 | End: 2022-05-02

## 2022-05-02 RX ORDER — PROCHLORPERAZINE EDISYLATE 5 MG/ML
10 INJECTION INTRAMUSCULAR; INTRAVENOUS ONCE
Status: COMPLETED | OUTPATIENT
Start: 2022-05-02 | End: 2022-05-02

## 2022-05-02 RX ADMIN — MAGNESIUM SULFATE HEPTAHYDRATE 2000 MG: 40 INJECTION, SOLUTION INTRAVENOUS at 22:22

## 2022-05-02 RX ADMIN — PROCHLORPERAZINE EDISYLATE 10 MG: 5 INJECTION INTRAMUSCULAR; INTRAVENOUS at 22:03

## 2022-05-02 RX ADMIN — DIPHENHYDRAMINE HYDROCHLORIDE 25 MG: 50 INJECTION, SOLUTION INTRAMUSCULAR; INTRAVENOUS at 22:03

## 2022-05-02 RX ADMIN — SODIUM CHLORIDE 1000 ML: 9 INJECTION, SOLUTION INTRAVENOUS at 22:08

## 2022-05-02 RX ADMIN — CALCIUM GLUCONATE 4000 MG: 98 INJECTION, SOLUTION INTRAVENOUS at 22:19

## 2022-05-02 ASSESSMENT — ENCOUNTER SYMPTOMS
CHEST TIGHTNESS: 0
PHOTOPHOBIA: 1
COUGH: 0
COLOR CHANGE: 0
SHORTNESS OF BREATH: 0
NAUSEA: 1
VOMITING: 0

## 2022-05-02 ASSESSMENT — PAIN DESCRIPTION - LOCATION: LOCATION: HEAD

## 2022-05-02 ASSESSMENT — PAIN SCALES - GENERAL: PAINLEVEL_OUTOF10: 10

## 2022-05-02 ASSESSMENT — PAIN - FUNCTIONAL ASSESSMENT: PAIN_FUNCTIONAL_ASSESSMENT: 0-10

## 2022-05-02 NOTE — LETTER
[address] [place] [address] [address] [address] EMERGENCY DEPT  [address] [place] [address]  Phone: [phone]             May 3, 2022    Patient: Osito Deng   YOB: 1980   Date of Visit: 5/2/2022       To Whom It May Concern:    Dick Ryan was seen and treated in our emergency department on 5/2/2022.        Sincerely,             Signature:__________________________________

## 2022-05-03 VITALS
TEMPERATURE: 97.7 F | DIASTOLIC BLOOD PRESSURE: 83 MMHG | HEART RATE: 65 BPM | SYSTOLIC BLOOD PRESSURE: 120 MMHG | RESPIRATION RATE: 14 BRPM | WEIGHT: 235 LBS | OXYGEN SATURATION: 96 % | HEIGHT: 63 IN | BODY MASS INDEX: 41.64 KG/M2

## 2022-05-03 PROCEDURE — 96366 THER/PROPH/DIAG IV INF ADDON: CPT

## 2022-05-03 ASSESSMENT — PAIN SCALES - GENERAL: PAINLEVEL_OUTOF10: 0

## 2022-05-03 ASSESSMENT — ENCOUNTER SYMPTOMS
RHINORRHEA: 0
EYE REDNESS: 0
ABDOMINAL PAIN: 0
BACK PAIN: 0

## 2022-05-03 NOTE — ED NOTES
Patient resting in bed with eyes closed. Respirations easy and unlabored. No distress noted. Call light within reach.        Raymundo Tesfaye RN  05/02/22 8381

## 2022-05-03 NOTE — ED NOTES
Patient resting in bed. Respirations easy and unlabored. No distress noted. Call light within reach.        Mary Garcia RN  05/02/22 8314

## 2022-05-03 NOTE — ED NOTES
Patient resting in bed with eyes closed. Respirations easy and unlabored. No distress noted. Call light within reach.        Raymundo Tesfaye RN  05/03/22 0104

## 2022-05-03 NOTE — ED NOTES
Discharge instructions and follow up discussed with pt. Pt verbalized understanding and denied further questions. LDA removed. Pt discharged with all belongings.         Louis Acosta RN  05/03/22 9185

## 2022-05-03 NOTE — ED NOTES
Pt medicated per MAR. Patient resting in bed. Respirations easy and unlabored. Denies further needs at this time. Call light within reach.        Jatinder Cash RN  05/02/22 2667

## 2022-05-03 NOTE — ED NOTES
Patient resting in bed. Respirations easy and unlabored. No distress noted. Call light within reach.        Louis Acosta RN  05/03/22 7902

## 2022-05-03 NOTE — ED PROVIDER NOTES
[facility] Emergency Department    CHIEF COMPLAINT       Chief Complaint   Patient presents with    Migraine       Nurses Notes reviewed and I agree except as noted in the HPI. HISTORY OF PRESENT ILLNESS    Marylu Felix jackson 39 y.o. female who presents to the ED for evaluation of severe migraine and vertigo. She states that migraine started several days ago. She is experiencing severe throbbing pain in her right temple, tenderness over her scalp (\"follicles hurt\"), sensitivity to light and sound, and \"glitter spots\" in her vision, which is similar to her previous migraine episodes. She has had two previous migraines, 12 and 16 years ago. She also has significant nausea and has been dry heaving, but has not vomited. She has intermittently felt as though she might pass out due to the pain. This morning, she stood and felt dizzy, unbalanced, lightheaded and noticed double vision. She leaned against the wall until the feeling improved. She was previously diagnosed with vertigo after similar episode in the past and was told to treat with Dramamine, which she has not tried today. She has been treating headache with ibuprofen, Tylenol, Advil and Motrin without benefit. She had gastric bypass several years ago, and has been struggling with low calcium recently. She notices muscle twitching after pressure, and feels as though her calcium is low. She has been taking her calcium supplement. She has intermittent chills and feeling hot, which has been ongoing for a long time; she believes they are hot flashes caused by a previous total hysterectomy. HPI was provided by the patient    REVIEW OF SYSTEMS     Review of Systems   Constitutional: Positive for chills. Negative for fatigue and fever. HENT: Negative for congestion, ear discharge, ear pain, postnasal drip and rhinorrhea. Eyes: Positive for photophobia and visual disturbance. Negative for redness.    Respiratory: Negative for cough, chest tightness and shortness of breath. Cardiovascular: Negative for chest pain, palpitations and leg swelling. Gastrointestinal: Positive for nausea. Negative for abdominal pain and vomiting (unable to vomit 2/2 gastric surgery). Genitourinary: Negative for difficulty urinating, dysuria, enuresis, flank pain and hematuria. Musculoskeletal: Negative for back pain and joint swelling. Skin: Negative for color change, pallor and rash. Neurological: Positive for dizziness, tremors, light-headedness and headaches. Negative for syncope, facial asymmetry and numbness. Psychiatric/Behavioral: Negative for agitation, behavioral problems and confusion. All other systems negative except as noted. PAST MEDICAL HISTORY     Past Medical History:   Diagnosis Date    Adjustment disorder     Anxiety     GERD (gastroesophageal reflux disease)     Hypothyroidism     Obesity     Seasonal allergies     Thyroid nodule        SURGICALHISTORY      has a past surgical history that includes  section (,,); Cholecystectomy (); Hysterectomy (); Tubal ligation (); Bariatric Surgery (11/15/2018); Upper gastrointestinal endoscopy (11/15/2018); other surgical history (11/15/2018); Tonsillectomy; and Thyroidectomy (N/A, 2019). CURRENT MEDICATIONS       Discharge Medication List as of 5/3/2022  1:45 AM      CONTINUE these medications which have NOT CHANGED    Details   clotrimazole-betamethasone (LOTRISONE) 1-0.05 % cream Apply topically 2 times daily. , Disp-15 g, R-0, Print      calcitRIOL (ROCALTROL) 0.5 MCG capsule Take 2 capsules by mouth 4 times daily, Disp-240 capsule, R-0Print      ergocalciferol (ERGOCALCIFEROL) 1.25 MG (61325 UT) capsule Take 1 capsule by mouth once a week, Disp-4 capsule, R-0Print      busPIRone (BUSPAR) 7.5 MG tablet Take 7.5 mg by mouth as neededHistorical Med      calcium citrate (CALCITRATE) 250 MG TABS tablet Take 4 tablets by mouth 4 times daily, Disp-480 tablet,R-0Normal      magnesium oxide (MAG-OX) 400 (241.3 Mg) MG TABS tablet Take 1 tablet by mouth daily for 7 days, Disp-7 tablet,R-0Normal      Loratadine-Pseudoephedrine (CLARITIN-D 24 HOUR PO) Take 1 tablet by mouth dailyHistorical Med      levothyroxine (SYNTHROID) 200 MCG tablet Take 200 mcg by mouth DailyHistorical Med      ondansetron (ZOFRAN ODT) 4 MG disintegrating tablet Take 1 tablet by mouth every 8 hours as needed for Nausea, Disp-20 tablet, R-0Print      vitamin B-12 (CYANOCOBALAMIN) 500 MCG tablet Take 1 tablet by mouth daily, R-6Historical Med      spironolactone (ALDACTONE) 50 MG tablet Take 1 tablet by mouth as needed , R-6Historical Med      furosemide (LASIX) 40 MG tablet Take 1 tablet by mouth as needed , R-6Historical Med      estradiol (ESTRACE) 2 MG tablet Take 1 tablet by mouth daily, R-6Historical Med      Multiple Vitamin (MULTI VITAMIN DAILY PO) Take by mouth Indications: flinstonesHistorical Med             ALLERGIES     is allergic to latex. FAMILY HISTORY     She indicated that her mother is alive. She indicated that her father is alive. She indicated that her sister is alive. She indicated that her brother is alive. She indicated that her maternal grandmother is . She indicated that her maternal grandfather is . She indicated that her paternal grandmother is alive. She indicated that her paternal grandfather is . family history includes Arthritis in her father; Cancer in her paternal grandmother; Diabetes in her father, maternal grandfather, and paternal grandfather; Hypertension in her father; Obesity in her maternal grandmother; Other in her mother.     SOCIAL HISTORY       Social History     Socioeconomic History    Marital status:      Spouse name: Not on file    Number of children: Not on file    Years of education: Not on file    Highest education level: Not on file   Occupational History    Not on file   Tobacco Use    Smoking status: Current Every Day Smoker     Packs/day: 1.00    Smokeless tobacco: Never Used   Vaping Use    Vaping Use: Some days    Substances: Occasionally   Substance and Sexual Activity    Alcohol use: Yes     Comment: social holidays     Drug use: Never    Sexual activity: Yes     Partners: Male   Other Topics Concern    Not on file   Social History Narrative    Not on file     Social Determinants of Health     Financial Resource Strain:     Difficulty of Paying Living Expenses: Not on file   Food Insecurity:     Worried About Running Out of Food in the Last Year: Not on file    Audrey of Food in the Last Year: Not on file   Transportation Needs:     Lack of Transportation (Medical): Not on file    Lack of Transportation (Non-Medical): Not on file   Physical Activity:     Days of Exercise per Week: Not on file    Minutes of Exercise per Session: Not on file   Stress:     Feeling of Stress : Not on file   Social Connections:     Frequency of Communication with Friends and Family: Not on file    Frequency of Social Gatherings with Friends and Family: Not on file    Attends Church Services: Not on file    Active Member of [address] [place] [address] or Organizations: Not on file    Attends Club or Organization Meetings: Not on file    Marital Status: Not on file   Intimate Partner Violence:     Fear of Current or Ex-Partner: Not on file    Emotionally Abused: Not on file    Physically Abused: Not on file    Sexually Abused: Not on file   Housing Stability:     Unable to Pay for Housing in the Last Year: Not on file    Number of [place] in the Last Year: Not on file    Unstable Housing in the Last Year: Not on file       PHYSICAL EXAM     INITIAL VITALS:  height is 5' 3\" (1.6 m) and weight is 235 lb (106.6 kg). Her oral temperature is 97.7 °F (36.5 °C). Her blood pressure is 120/83 and her pulse is 65. Her respiration is 14 and oxygen saturation is 96%. Physical Exam  Vitals and nursing note reviewed. Constitutional:       General: She is in acute distress. Appearance: Normal appearance. She is well-developed. She is not toxic-appearing or diaphoretic. HENT:      Head: Normocephalic and atraumatic. Nose: Nose normal.      Mouth/Throat:      Mouth: Mucous membranes are moist.      Pharynx: Oropharynx is clear. Eyes:      General:         Right eye: No discharge. Left eye: No discharge. Conjunctiva/sclera: Conjunctivae normal.   Neck:      Trachea: No tracheal deviation. Cardiovascular:      Rate and Rhythm: Normal rate and regular rhythm. Pulses: Normal pulses. Heart sounds: Normal heart sounds. No murmur heard. No gallop. Comments: Normal capillary refill  Pulmonary:      Effort: Pulmonary effort is normal. No respiratory distress. Breath sounds: Normal breath sounds. No stridor. Abdominal:      General: Bowel sounds are normal.      Palpations: Abdomen is soft. Musculoskeletal:         General: No tenderness or deformity. Normal range of motion. Cervical back: Normal range of motion. Skin:     General: Skin is warm and dry. Capillary Refill: Capillary refill takes less than 2 seconds. Coloration: Skin is not pale. Findings: No erythema or rash. Neurological:      General: No focal deficit present. Mental Status: She is alert and oriented to person, place, and time. Cranial Nerves: No cranial nerve deficit or facial asymmetry.    Psychiatric:         Mood and Affect: Mood normal.         Behavior: Behavior normal.         DIFFERENTIAL DIAGNOSIS:   Migraine  Intracranial Bleed  Vertigo  Hypocalcemia  Hypothyroid    DIAGNOSTIC RESULTS     EKG: All EKG's are interpreted by the Emergency Department Physician who eithersigns or Co-signs this chart in the absence of a cardiologist.        RADIOLOGY: non-plainfilm images(s) such as CT, Ultrasound and MRI are read by the radiologist.  Plain radiographic images are visualized and preliminarily interpreted by the emergency physician unless otherwise stated below. CT HEAD WO CONTRAST   Final Result   Impression:   1. No acute intracranial hemorrhage or process identified. This document has been electronically signed by: Zandra Christensen MD on    05/02/2022 10:14 PM      All CTs at this facility use dose modulation techniques and iterative    reconstructions, and/or weight-based dosing   when appropriate to reduce radiation to a low as reasonably achievable. LABS:   Labs Reviewed   CBC WITH AUTO DIFFERENTIAL - Abnormal; Notable for the following components:       Result Value    RDW-CV 15.0 (*)     RDW-SD 47.4 (*)     Eosinophils Absolute 0.5 (*)     All other components within normal limits   BASIC METABOLIC PANEL - Abnormal; Notable for the following components:    Calcium 5.6 (*)     All other components within normal limits   GLOMERULAR FILTRATION RATE, ESTIMATED - Abnormal; Notable for the following components:    Est, Glom Filt Rate 69 (*)     All other components within normal limits   CALCIUM, IONIZED - Abnormal; Notable for the following components:    Calcium, Ion 0.66 (*)     All other components within normal limits   ANION GAP   OSMOLALITY   MAGNESIUM       EMERGENCY DEPARTMENT COURSE:   Vitals:    Vitals:    05/02/22 2300 05/03/22 0000 05/03/22 0045 05/03/22 0130   BP: 108/72 105/69 124/83 120/83   Pulse: 62 62 64 65   Resp: 13 18 12 14   Temp:       TempSrc:       SpO2: 90% 93% 96% 96%   Weight:       Height:                                Internal Administration   First Dose COVID-19, J&J, PF, 0.5 mL  03/09/2021   Second Dose           Last COVID Lab SARS-CoV-2, NAAT (no units)   Date Value   12/11/2021 NOT  DETECTED            MDM      Was seen and evaluated in the emergency room in stable condition. Appropriate labs and imaging are ordered and reviewed. Physical exam shows a normal neurological exam.  CT scan of the head is negative for acute pathology. Patient is treated with Benadryl, Compazine, Toradol with improvement in symptoms. She has a history of chronic hypocalcemia. Calcium level was 5.6. Ionized calcium 0.66. She is treated with 4 g of calcium. Patient is reevaluated and is feeling much better. She is comfortable with the plan of care to be discharged home. To return for new or worsening symptoms. Medications   diphenhydrAMINE (BENADRYL) injection 25 mg (25 mg IntraVENous Given 5/2/22 2203)   prochlorperazine (COMPAZINE) injection 10 mg (10 mg IntraVENous Given 5/2/22 2203)   calcium gluconate 4,000 mg in dextrose 5 % 100 mL IVPB (0 mg IntraVENous Stopped 5/3/22 0220)   0.9 % sodium chloride bolus (0 mLs IntraVENous Stopped 5/2/22 2312)   magnesium sulfate 2000 mg in 50 mL IVPB premix (0 mg IntraVENous Stopped 5/3/22 0028)       Please note that the patient was evaluated during a pandemic. All efforts were made for HIPPA compliance as well as provision of appropriate care. Patient was seen independently by myself. The patient's final impression and disposition and plan was determined by myself. Strict return precautions and follow up instructions were discussed with the patient prior to discharge, with which the patient agrees. Physical assessment findings, diagnostic testing(s) if applicable, and vital signs reviewed with patient/patient representative. Questions answered. Medications asdirected, including OTC medications for supportive care. Education provided on medications. Differential diagnosis(s) discussed with patient/patient representative. Home care/self care instructions reviewed withpatient/patient representative. Patient is to follow-up with family care provider in 2-3 days if no improvement. Patient is to go to the emergency department if symptoms worsen. Patient/patient representative isaware of care plan, questions answered, verbalizes understanding and is in agreement.      CRITICAL CARE: None    CONSULTS:  None    PROCEDURES:  None    FINAL IMPRESSION     1. Hypocalcemia    2.  Other migraine without status migrainosus, not intractable          DISPOSITION/PLAN   DISPOSITION Decision To Discharge 05/03/2022 02:28:31 AM      PATIENT REFERREDTO:  EBEN Maynard CNP  [address]  [phone]    Schedule an appointment as soon as possible for a visit in 2 days  For follow up      DISCHARGE MEDICATIONS:  Discharge Medication List as of 5/3/2022  1:45 AM          (Please note that portions of this note were completed with a voice recognition program.  Efforts were made to edit the dictations but occasionally words are mis-transcribed.)         EBEN Ravi CNP, APRN - CNP  05/03/22 0413

## 2022-05-03 NOTE — ED TRIAGE NOTES
Pt presents to ED with chief complaint of migraine that began \"a couple days ago\" and double vision that began this morning. Pt also is concerned her calcium is low, as she states she has her normal symptoms when her calcium is low. A&O. Respirations unlabored on room air. Call light within reach.

## 2022-05-03 NOTE — ED NOTES
Patient resting in bed. Reports her headache is gone. Respirations easy and unlabored. No distress noted. Call light within reach.        Mariaa Arambula RN  05/03/22 0010

## 2022-09-09 ENCOUNTER — APPOINTMENT (OUTPATIENT)
Dept: GENERAL RADIOLOGY | Facility: HOSPITAL | Age: 42
End: 2022-09-09

## 2022-09-09 VITALS
HEART RATE: 79 BPM | HEIGHT: 63 IN | WEIGHT: 245.59 LBS | BODY MASS INDEX: 43.52 KG/M2 | DIASTOLIC BLOOD PRESSURE: 75 MMHG | TEMPERATURE: 97.3 F | OXYGEN SATURATION: 96 % | RESPIRATION RATE: 20 BRPM | SYSTOLIC BLOOD PRESSURE: 104 MMHG

## 2022-09-09 LAB
ALBUMIN SERPL-MCNC: 4.3 G/DL (ref 3.5–5.2)
ALBUMIN/GLOB SERPL: 1.2 G/DL
ALP SERPL-CCNC: 110 U/L (ref 39–117)
ALT SERPL W P-5'-P-CCNC: 21 U/L (ref 1–33)
ANION GAP SERPL CALCULATED.3IONS-SCNC: 14.2 MMOL/L (ref 5–15)
AST SERPL-CCNC: 29 U/L (ref 1–32)
BASOPHILS # BLD AUTO: 0.05 10*3/MM3 (ref 0–0.2)
BASOPHILS NFR BLD AUTO: 0.6 % (ref 0–1.5)
BILIRUB SERPL-MCNC: 0.2 MG/DL (ref 0–1.2)
BUN SERPL-MCNC: 21 MG/DL (ref 6–20)
BUN/CREAT SERPL: 18.6 (ref 7–25)
CALCIUM SPEC-SCNC: 6.2 MG/DL (ref 8.6–10.5)
CHLORIDE SERPL-SCNC: 100 MMOL/L (ref 98–107)
CO2 SERPL-SCNC: 26.8 MMOL/L (ref 22–29)
CREAT SERPL-MCNC: 1.13 MG/DL (ref 0.57–1)
DEPRECATED RDW RBC AUTO: 43.2 FL (ref 37–54)
EGFRCR SERPLBLD CKD-EPI 2021: 62.8 ML/MIN/1.73
EOSINOPHIL # BLD AUTO: 0.32 10*3/MM3 (ref 0–0.4)
EOSINOPHIL NFR BLD AUTO: 3.8 % (ref 0.3–6.2)
ERYTHROCYTE [DISTWIDTH] IN BLOOD BY AUTOMATED COUNT: 13.4 % (ref 12.3–15.4)
GLOBULIN UR ELPH-MCNC: 3.6 GM/DL
GLUCOSE SERPL-MCNC: 104 MG/DL (ref 65–99)
HCT VFR BLD AUTO: 40.9 % (ref 34–46.6)
HGB BLD-MCNC: 13.7 G/DL (ref 12–15.9)
HOLD SPECIMEN: NORMAL
HOLD SPECIMEN: NORMAL
IMM GRANULOCYTES # BLD AUTO: 0.02 10*3/MM3 (ref 0–0.05)
IMM GRANULOCYTES NFR BLD AUTO: 0.2 % (ref 0–0.5)
LIPASE SERPL-CCNC: 56 U/L (ref 13–60)
LYMPHOCYTES # BLD AUTO: 2.22 10*3/MM3 (ref 0.7–3.1)
LYMPHOCYTES NFR BLD AUTO: 26.4 % (ref 19.6–45.3)
MCH RBC QN AUTO: 29.5 PG (ref 26.6–33)
MCHC RBC AUTO-ENTMCNC: 33.5 G/DL (ref 31.5–35.7)
MCV RBC AUTO: 88.1 FL (ref 79–97)
MONOCYTES # BLD AUTO: 0.51 10*3/MM3 (ref 0.1–0.9)
MONOCYTES NFR BLD AUTO: 6.1 % (ref 5–12)
NEUTROPHILS NFR BLD AUTO: 5.29 10*3/MM3 (ref 1.7–7)
NEUTROPHILS NFR BLD AUTO: 62.9 % (ref 42.7–76)
NRBC BLD AUTO-RTO: 0 /100 WBC (ref 0–0.2)
PLATELET # BLD AUTO: 247 10*3/MM3 (ref 140–450)
PMV BLD AUTO: 11.1 FL (ref 6–12)
POTASSIUM SERPL-SCNC: 4.3 MMOL/L (ref 3.5–5.2)
PROT SERPL-MCNC: 7.9 G/DL (ref 6–8.5)
RBC # BLD AUTO: 4.64 10*6/MM3 (ref 3.77–5.28)
SODIUM SERPL-SCNC: 141 MMOL/L (ref 136–145)
WBC NRBC COR # BLD: 8.41 10*3/MM3 (ref 3.4–10.8)
WHOLE BLOOD HOLD COAG: NORMAL
WHOLE BLOOD HOLD SPECIMEN: NORMAL

## 2022-09-09 PROCEDURE — 36415 COLL VENOUS BLD VENIPUNCTURE: CPT

## 2022-09-09 PROCEDURE — 85025 COMPLETE CBC W/AUTO DIFF WBC: CPT

## 2022-09-09 PROCEDURE — 74018 RADEX ABDOMEN 1 VIEW: CPT

## 2022-09-09 PROCEDURE — 83690 ASSAY OF LIPASE: CPT

## 2022-09-09 PROCEDURE — 80053 COMPREHEN METABOLIC PANEL: CPT

## 2022-09-09 PROCEDURE — 99282 EMERGENCY DEPT VISIT SF MDM: CPT

## 2022-09-09 RX ORDER — SODIUM CHLORIDE 0.9 % (FLUSH) 0.9 %
10 SYRINGE (ML) INJECTION AS NEEDED
Status: DISCONTINUED | OUTPATIENT
Start: 2022-09-09 | End: 2022-09-10 | Stop reason: HOSPADM

## 2022-09-10 ENCOUNTER — HOSPITAL ENCOUNTER (EMERGENCY)
Dept: HOSPITAL 49 - FER | Age: 42
Discharge: HOME | End: 2022-09-10
Payer: COMMERCIAL

## 2022-09-10 ENCOUNTER — HOSPITAL ENCOUNTER (EMERGENCY)
Facility: HOSPITAL | Age: 42
Discharge: LEFT BEFORE DISCHARGE | End: 2022-09-10
Admitting: STUDENT IN AN ORGANIZED HEALTH CARE EDUCATION/TRAINING PROGRAM

## 2022-09-10 DIAGNOSIS — F17.200: ICD-10-CM

## 2022-09-10 DIAGNOSIS — E83.51: ICD-10-CM

## 2022-09-10 DIAGNOSIS — Z91.040: ICD-10-CM

## 2022-09-10 DIAGNOSIS — R10.30: Primary | ICD-10-CM

## 2022-09-10 LAB
ALBUMIN SERPL-MCNC: 3.6 G/DL (ref 3.4–5)
ALKALINE PHOSHATASE: 96 U/L (ref 46–116)
ALT SERPL-CCNC: 29 U/L (ref 14–59)
AST: 29 U/L (ref 15–37)
BASOPHIL: 0.8 % (ref 0–2)
BILIRUB UR QL STRIP: NEGATIVE
BILIRUBIN - TOTAL: 0.3 MG/DL (ref 0.2–1)
BILIRUBIN: NEGATIVE MG/DL
BLOOD: NEGATIVE ERY/UL
BUN SERPL-MCNC: 20 MG/DL (ref 7–18)
BUN/CREAT RATIO (CALC): 20.6 RATIO
CHLORIDE: 103 MMOL/L (ref 98–107)
CLARITY UR: CLEAR
CLARITY UR: CLEAR
CO2 (BICARBONATE): 28 MMOL/L (ref 21–32)
COLOR UR: YELLOW
COLOR: YELLOW
CREATININE: 0.97 MG/DL (ref 0.51–0.95)
EOSINOPHIL: 3.9 % (ref 0–5)
GLOBULIN (CALCULATION): 4 G/DL
GLUCOSE (U): NORMAL MG/DL
GLUCOSE SERPL-MCNC: 94 MG/DL (ref 74–106)
GLUCOSE UR STRIP-MCNC: NEGATIVE MG/DL
HCT: 42.5 % (ref 37–47)
HGB BLD-MCNC: 14.2 G/DL (ref 12.5–16)
HGB UR QL STRIP.AUTO: NEGATIVE
KETONES UR QL STRIP: NEGATIVE
LACTIC ACID: 0.5 MMOL/L (ref 0.4–1.9)
LEUKOCYTE ESTERASE UR QL STRIP.AUTO: NEGATIVE
LEUKOCYTES: NEGATIVE LEU/UL
LIPASE: 124 U/L (ref 73–393)
LYMPHOCYTE: 24.9 % (ref 15–48)
MCH RBC QN AUTO: 29.6 PG (ref 25–31)
MCHC RBC AUTO-ENTMCNC: 33.4 G/DL (ref 32–36)
MCV: 88.7 FL (ref 78–100)
MONOCYTE: 5.7 % (ref 0–12)
MPV: 11.3 FL (ref 6–9.5)
NEUTROPHIL: 64.6 % (ref 41–80)
NITRITE UR QL STRIP: NEGATIVE
NITRITE: NEGATIVE MG/DL
NRBC: 0
PH UR STRIP.AUTO: 5.5 [PH] (ref 5–8)
PLT: 246 K/UL (ref 150–400)
POTASSIUM: 3.8 MMOL/L (ref 3.5–5.1)
PROT UR QL STRIP: NEGATIVE
PROTEIN: NEGATIVE MG/DL
RBC MORPHOLOGY: NORMAL
RBC: 4.79 M/UL (ref 4.2–5.4)
RDW: 13.5 % (ref 11.5–14)
SP GR UR STRIP: 1.03 (ref 1–1.03)
SPECIFIC GRAVITY: 1.02 (ref 1–1.03)
TOTAL PROTEIN: 7.6 G/DL (ref 6.4–8.2)
UROBILINOGEN UR QL STRIP: NORMAL
UROBILINOGEN: 0.2 MG/DL (ref 0.2–1)
WBC: 8.4 K/UL (ref 4–10.5)

## 2022-09-10 PROCEDURE — C9113 INJ PANTOPRAZOLE SODIUM, VIA: HCPCS

## 2022-09-10 PROCEDURE — 81003 URINALYSIS AUTO W/O SCOPE: CPT

## 2022-09-10 NOTE — ED PROVIDER NOTES
"Time: 10:25 PM EDT  Arrived by: private car  Chief Complaint:   History provided by:   History is limited by: N/A     History of Present Illness:  Patient is a 41 y.o. year old female who presents to the emergency department with complaints of abdominal pain after eating a hot pocket tonight.  She reports history of a gastric bypass.        HPI    Similar Symptoms Previously:   Recently seen:       Patient Care Team  Primary Care Provider: Provider, No Known    Past Medical History:     Allergies   Allergen Reactions   • Latex Rash     No past medical history on file.  No past surgical history on file.  No family history on file.    Home Medications:  Prior to Admission medications    Not on File        Social History:      Recent travel: no     Review of Systems:  Review of Systems   Constitutional: Negative for fever.   Gastrointestinal: Positive for abdominal pain.   All other systems reviewed and are negative.       Physical Exam:  /75 (BP Location: Left arm, Patient Position: Sitting)   Pulse 79   Temp 97.3 °F (36.3 °C) (Oral)   Resp 20   Ht 160 cm (63\")   Wt 111 kg (245 lb 9.5 oz)   SpO2 96%   BMI 43.50 kg/m²     Physical Exam  Vitals and nursing note reviewed.   HENT:      Head: Normocephalic.      Mouth/Throat:      Mouth: Mucous membranes are moist.   Eyes:      Pupils: Pupils are equal, round, and reactive to light.   Pulmonary:      Effort: Pulmonary effort is normal.   Abdominal:      General: There is no distension.   Musculoskeletal:      Cervical back: Neck supple.   Skin:     General: Skin is warm and dry.   Neurological:      General: No focal deficit present.      Mental Status: She is alert and oriented to person, place, and time.   Psychiatric:         Mood and Affect: Mood normal.         Behavior: Behavior normal.                Medications in the Emergency Department:  Medications - No data to display     Labs  Lab Results (last 24 hours)     ** No results found for the last 24 " hours. **           Imaging:  No Radiology Exams Resulted Within Past 24 Hours    Procedures:  Procedures    Progress  ED Course as of 09/20/22 0057   Fri Sep 09, 2022   2225 --- PROVIDER IN TRIAGE NOTE ---    The patient was seen and evaluated by fely Muñiz in triage. Orders were placed and the patient is currently awaiting disposition. [KS]      ED Course User Index  [KS] Traci Muñiz APRN                            The patient was initially evaluated in the triage area where orders were placed. The patient was later dispositioned by HOA Soriano.      Medical Decision Making:  [facility]       Final diagnoses:   None        Disposition:  ED Disposition     ED Disposition   Eloped    Condition   --    Comment   --             This medical record created using voice recognition software.           Traci Muñiz APRN  09/20/22 0055       Traci Muñiz APRN  09/20/22 0057

## 2023-01-28 ENCOUNTER — HOSPITAL ENCOUNTER (EMERGENCY)
Facility: HOSPITAL | Age: 43
Discharge: HOME | End: 2023-01-29
Attending: EMERGENCY MEDICINE | Admitting: EMERGENCY MEDICINE

## 2023-01-28 DIAGNOSIS — E83.51 HYPOCALCEMIA: Primary | ICD-10-CM

## 2023-01-28 LAB
ALBUMIN SERPL-MCNC: 4 G/DL (ref 3.5–5.2)
ALBUMIN/GLOB SERPL: 1.3 G/DL
ALP SERPL-CCNC: 122 U/L (ref 39–117)
ALT SERPL W P-5'-P-CCNC: 14 U/L (ref 1–33)
ANION GAP SERPL CALCULATED.3IONS-SCNC: 11.4 MMOL/L (ref 5–15)
AST SERPL-CCNC: 19 U/L (ref 1–32)
BASOPHILS # BLD AUTO: 0.06 10*3/MM3 (ref 0–0.2)
BASOPHILS NFR BLD AUTO: 0.8 % (ref 0–1.5)
BILIRUB SERPL-MCNC: 0.2 MG/DL (ref 0–1.2)
BUN SERPL-MCNC: 17 MG/DL (ref 6–20)
BUN/CREAT SERPL: 18.1 (ref 7–25)
CALCIUM SPEC-SCNC: 5.8 MG/DL (ref 8.6–10.5)
CHLORIDE SERPL-SCNC: 99 MMOL/L (ref 98–107)
CO2 SERPL-SCNC: 30.6 MMOL/L (ref 22–29)
CREAT SERPL-MCNC: 0.94 MG/DL (ref 0.57–1)
DEPRECATED RDW RBC AUTO: 42.5 FL (ref 37–54)
EGFRCR SERPLBLD CKD-EPI 2021: 77.9 ML/MIN/1.73
EOSINOPHIL # BLD AUTO: 0.32 10*3/MM3 (ref 0–0.4)
EOSINOPHIL NFR BLD AUTO: 4 % (ref 0.3–6.2)
ERYTHROCYTE [DISTWIDTH] IN BLOOD BY AUTOMATED COUNT: 13.2 % (ref 12.3–15.4)
GLOBULIN UR ELPH-MCNC: 3.1 GM/DL
GLUCOSE SERPL-MCNC: 86 MG/DL (ref 65–99)
HCT VFR BLD AUTO: 38.3 % (ref 34–46.6)
HGB BLD-MCNC: 12.8 G/DL (ref 12–15.9)
HOLD SPECIMEN: NORMAL
HOLD SPECIMEN: NORMAL
IMM GRANULOCYTES # BLD AUTO: 0.01 10*3/MM3 (ref 0–0.05)
IMM GRANULOCYTES NFR BLD AUTO: 0.1 % (ref 0–0.5)
LYMPHOCYTES # BLD AUTO: 2.11 10*3/MM3 (ref 0.7–3.1)
LYMPHOCYTES NFR BLD AUTO: 26.5 % (ref 19.6–45.3)
MCH RBC QN AUTO: 29.2 PG (ref 26.6–33)
MCHC RBC AUTO-ENTMCNC: 33.4 G/DL (ref 31.5–35.7)
MCV RBC AUTO: 87.4 FL (ref 79–97)
MONOCYTES # BLD AUTO: 0.58 10*3/MM3 (ref 0.1–0.9)
MONOCYTES NFR BLD AUTO: 7.3 % (ref 5–12)
NEUTROPHILS NFR BLD AUTO: 4.89 10*3/MM3 (ref 1.7–7)
NEUTROPHILS NFR BLD AUTO: 61.3 % (ref 42.7–76)
NRBC BLD AUTO-RTO: 0 /100 WBC (ref 0–0.2)
PLATELET # BLD AUTO: 222 10*3/MM3 (ref 140–450)
PMV BLD AUTO: 11.3 FL (ref 6–12)
POTASSIUM SERPL-SCNC: 4.4 MMOL/L (ref 3.5–5.2)
PROT SERPL-MCNC: 7.1 G/DL (ref 6–8.5)
RBC # BLD AUTO: 4.38 10*6/MM3 (ref 3.77–5.28)
SODIUM SERPL-SCNC: 141 MMOL/L (ref 136–145)
WBC NRBC COR # BLD: 7.97 10*3/MM3 (ref 3.4–10.8)
WHOLE BLOOD HOLD COAG: NORMAL
WHOLE BLOOD HOLD SPECIMEN: NORMAL

## 2023-01-28 PROCEDURE — 99283 EMERGENCY DEPT VISIT LOW MDM: CPT

## 2023-01-28 PROCEDURE — 36415 COLL VENOUS BLD VENIPUNCTURE: CPT | Performed by: EMERGENCY MEDICINE

## 2023-01-28 PROCEDURE — 80053 COMPREHEN METABOLIC PANEL: CPT | Performed by: EMERGENCY MEDICINE

## 2023-01-28 PROCEDURE — 99282 EMERGENCY DEPT VISIT SF MDM: CPT

## 2023-01-28 PROCEDURE — 25010000002 CALCIUM GLUCONATE 2-0.675 GM/100ML-% SOLUTION: Performed by: EMERGENCY MEDICINE

## 2023-01-28 PROCEDURE — 96365 THER/PROPH/DIAG IV INF INIT: CPT

## 2023-01-28 PROCEDURE — 85025 COMPLETE CBC W/AUTO DIFF WBC: CPT | Performed by: EMERGENCY MEDICINE

## 2023-01-28 RX ORDER — SPIRONOLACTONE 50 MG/1
50 TABLET, FILM COATED ORAL DAILY
COMMUNITY

## 2023-01-28 RX ORDER — ESTRADIOL 2 MG/1
2 TABLET ORAL DAILY
COMMUNITY

## 2023-01-28 RX ORDER — CITALOPRAM 10 MG/1
10 TABLET ORAL DAILY
COMMUNITY

## 2023-01-28 RX ORDER — CALCIUM GLUCONATE 20 MG/ML
2 INJECTION, SOLUTION INTRAVENOUS ONCE
Status: COMPLETED | OUTPATIENT
Start: 2023-01-28 | End: 2023-01-28

## 2023-01-28 RX ORDER — THIAMINE HCL 100 MG
1 TABLET ORAL DAILY
COMMUNITY

## 2023-01-28 RX ORDER — LEVOTHYROXINE SODIUM 0.2 MG/1
400 TABLET ORAL DAILY
COMMUNITY

## 2023-01-28 RX ORDER — LEVOTHYROXINE SODIUM 112 UG/1
112 TABLET ORAL DAILY
COMMUNITY
End: 2023-01-28

## 2023-01-28 RX ORDER — FAMOTIDINE 20 MG/1
20 TABLET, FILM COATED ORAL ONCE
COMMUNITY

## 2023-01-28 RX ORDER — CALCITRIOL 0.5 UG/1
1 CAPSULE, LIQUID FILLED ORAL 4 TIMES DAILY
COMMUNITY

## 2023-01-28 RX ORDER — LORATADINE 10 MG/1
10 TABLET ORAL DAILY
COMMUNITY

## 2023-01-28 RX ADMIN — CALCIUM GLUCONATE 2 G: 20 INJECTION, SOLUTION INTRAVENOUS at 23:19

## 2023-01-29 VITALS
HEART RATE: 58 BPM | HEIGHT: 63 IN | SYSTOLIC BLOOD PRESSURE: 116 MMHG | OXYGEN SATURATION: 92 % | RESPIRATION RATE: 18 BRPM | TEMPERATURE: 97.8 F | BODY MASS INDEX: 46.29 KG/M2 | DIASTOLIC BLOOD PRESSURE: 85 MMHG | WEIGHT: 261.25 LBS

## 2023-02-21 NOTE — ED NOTES
EKG complete. Pt requesting to not be placed on heart monitor and to stay in civilian clothing.  Will monitor      Gideon Cee, TOBIAS  08/27/21 2011 chest pain

## 2023-03-31 ENCOUNTER — HOSPITAL ENCOUNTER (EMERGENCY)
Facility: HOSPITAL | Age: 43
Discharge: HOME | End: 2023-03-31
Attending: EMERGENCY MEDICINE | Admitting: EMERGENCY MEDICINE

## 2023-03-31 VITALS
DIASTOLIC BLOOD PRESSURE: 75 MMHG | HEIGHT: 63 IN | WEIGHT: 263.67 LBS | OXYGEN SATURATION: 100 % | BODY MASS INDEX: 46.72 KG/M2 | SYSTOLIC BLOOD PRESSURE: 117 MMHG | RESPIRATION RATE: 20 BRPM | TEMPERATURE: 97.6 F | HEART RATE: 56 BPM

## 2023-03-31 DIAGNOSIS — E83.51 HYPOCALCEMIA: Primary | ICD-10-CM

## 2023-03-31 LAB
ALBUMIN SERPL-MCNC: 4.3 G/DL (ref 3.5–5.2)
ALBUMIN/GLOB SERPL: 1.3 G/DL
ALP SERPL-CCNC: 129 U/L (ref 39–117)
ALT SERPL W P-5'-P-CCNC: 13 U/L (ref 1–33)
ANION GAP SERPL CALCULATED.3IONS-SCNC: 12.7 MMOL/L (ref 5–15)
AST SERPL-CCNC: 21 U/L (ref 1–32)
BASOPHILS # BLD AUTO: 0.05 10*3/MM3 (ref 0–0.2)
BASOPHILS NFR BLD AUTO: 0.6 % (ref 0–1.5)
BILIRUB SERPL-MCNC: 0.2 MG/DL (ref 0–1.2)
BUN SERPL-MCNC: 17 MG/DL (ref 6–20)
BUN/CREAT SERPL: 18.3 (ref 7–25)
CALCIUM SPEC-SCNC: 5.4 MG/DL (ref 8.6–10.5)
CHLORIDE SERPL-SCNC: 98 MMOL/L (ref 98–107)
CO2 SERPL-SCNC: 27.3 MMOL/L (ref 22–29)
CREAT SERPL-MCNC: 0.93 MG/DL (ref 0.57–1)
DEPRECATED RDW RBC AUTO: 43 FL (ref 37–54)
EGFRCR SERPLBLD CKD-EPI 2021: 78.9 ML/MIN/1.73
EOSINOPHIL # BLD AUTO: 0.28 10*3/MM3 (ref 0–0.4)
EOSINOPHIL NFR BLD AUTO: 3.6 % (ref 0.3–6.2)
ERYTHROCYTE [DISTWIDTH] IN BLOOD BY AUTOMATED COUNT: 13.9 % (ref 12.3–15.4)
GLOBULIN UR ELPH-MCNC: 3.2 GM/DL
GLUCOSE SERPL-MCNC: 98 MG/DL (ref 65–99)
HCT VFR BLD AUTO: 42.2 % (ref 34–46.6)
HGB BLD-MCNC: 14.2 G/DL (ref 12–15.9)
HOLD SPECIMEN: NORMAL
HOLD SPECIMEN: NORMAL
IMM GRANULOCYTES # BLD AUTO: 0.02 10*3/MM3 (ref 0–0.05)
IMM GRANULOCYTES NFR BLD AUTO: 0.3 % (ref 0–0.5)
LYMPHOCYTES # BLD AUTO: 1.97 10*3/MM3 (ref 0.7–3.1)
LYMPHOCYTES NFR BLD AUTO: 25.3 % (ref 19.6–45.3)
MCH RBC QN AUTO: 28.3 PG (ref 26.6–33)
MCHC RBC AUTO-ENTMCNC: 33.6 G/DL (ref 31.5–35.7)
MCV RBC AUTO: 84.2 FL (ref 79–97)
MONOCYTES # BLD AUTO: 0.54 10*3/MM3 (ref 0.1–0.9)
MONOCYTES NFR BLD AUTO: 6.9 % (ref 5–12)
NEUTROPHILS NFR BLD AUTO: 4.93 10*3/MM3 (ref 1.7–7)
NEUTROPHILS NFR BLD AUTO: 63.3 % (ref 42.7–76)
NRBC BLD AUTO-RTO: 0 /100 WBC (ref 0–0.2)
PLATELET # BLD AUTO: 226 10*3/MM3 (ref 140–450)
PMV BLD AUTO: 11.8 FL (ref 6–12)
POTASSIUM SERPL-SCNC: 4.6 MMOL/L (ref 3.5–5.2)
PROT SERPL-MCNC: 7.5 G/DL (ref 6–8.5)
RBC # BLD AUTO: 5.01 10*6/MM3 (ref 3.77–5.28)
SODIUM SERPL-SCNC: 138 MMOL/L (ref 136–145)
WBC NRBC COR # BLD: 7.79 10*3/MM3 (ref 3.4–10.8)
WHOLE BLOOD HOLD COAG: NORMAL
WHOLE BLOOD HOLD SPECIMEN: NORMAL

## 2023-03-31 PROCEDURE — 36415 COLL VENOUS BLD VENIPUNCTURE: CPT | Performed by: EMERGENCY MEDICINE

## 2023-03-31 PROCEDURE — 99282 EMERGENCY DEPT VISIT SF MDM: CPT

## 2023-03-31 PROCEDURE — 80053 COMPREHEN METABOLIC PANEL: CPT | Performed by: EMERGENCY MEDICINE

## 2023-03-31 PROCEDURE — 96365 THER/PROPH/DIAG IV INF INIT: CPT

## 2023-03-31 PROCEDURE — 96366 THER/PROPH/DIAG IV INF ADDON: CPT

## 2023-03-31 PROCEDURE — 25010000002 CALCIUM GLUCONATE-NACL 1-0.675 GM/50ML-% SOLUTION: Performed by: EMERGENCY MEDICINE

## 2023-03-31 PROCEDURE — 93010 ELECTROCARDIOGRAM REPORT: CPT | Performed by: INTERNAL MEDICINE

## 2023-03-31 PROCEDURE — 93005 ELECTROCARDIOGRAM TRACING: CPT | Performed by: EMERGENCY MEDICINE

## 2023-03-31 PROCEDURE — 85025 COMPLETE CBC W/AUTO DIFF WBC: CPT | Performed by: EMERGENCY MEDICINE

## 2023-03-31 RX ORDER — CALCIUM GLUCONATE 20 MG/ML
1 INJECTION, SOLUTION INTRAVENOUS ONCE
Status: COMPLETED | OUTPATIENT
Start: 2023-03-31 | End: 2023-03-31

## 2023-03-31 RX ADMIN — SODIUM CHLORIDE 500 ML: 9 INJECTION, SOLUTION INTRAVENOUS at 12:08

## 2023-03-31 RX ADMIN — CALCIUM GLUCONATE 1 G: 20 INJECTION, SOLUTION INTRAVENOUS at 12:58

## 2023-03-31 RX ADMIN — CALCIUM GLUCONATE 1 G: 20 INJECTION, SOLUTION INTRAVENOUS at 12:09

## 2023-03-31 NOTE — ED NOTES
"PT HAS H/O OF LOW CALCIUM SINCE HAVING HER THYROID REMOVED IN 2019, \"SHE HAS THIS PROBLEM FREQUENTLY AND COMES TO THE ED FOR INFUSIONS\". SHE DOES SUPPLEMENTS AT HOME BUT HAS TIMES WHEN SHE CANT CONTROL IT PO  "

## 2023-03-31 NOTE — DISCHARGE INSTRUCTIONS
Take calcium as directed.  Return for worsening symptoms.  Follow-up with your doctor or endocrinologist by calling for an appointment.

## 2023-03-31 NOTE — ED PROVIDER NOTES
"Time: 12:04 PM EDT  Date of encounter:  3/31/2023  Independent Historian/Clinical History and Information was obtained by:   Patient  Chief Complaint: Weakness and spasm    History is limited by: N/A    History of Present Illness:  Patient is a 42 y.o. year old female who presents to the emergency department for evaluation of weakness and spasm.  The patient states that she had a thyroidectomy in 2019 in [place] and the surgeon \"nicked\" her parathyroid and since that time she has had episodic hypocalcemia.  Patient also has had gastric bypass and states that she has difficulty absorbing certain types of calcium.  The patient presents today because she has had generalized weakness along with muscle spasms.  The patient denies any difficulty swallowing or breathing.  She has had no chest pain or palpitations.  She has had no seizures.  She simply states that she has some spasms at times and also generalized weakness.  She also has numbness diffusely.  Patient states that when she has the symptoms she typically receives 2 g of calcium and after that she can go home    HPI    Patient Care Team  Primary Care Provider: Provider, No Known    Past Medical History:     Allergies   Allergen Reactions   • Latex Rash     Past Medical History:   Diagnosis Date   • Disease of thyroid gland    • Hypocalcemia      Past Surgical History:   Procedure Laterality Date   • ADENOIDECTOMY     • CARPAL TUNNEL RELEASE     •  SECTION     • GASTRIC BYPASS     • HYSTERECTOMY     • THYROID SURGERY     • TONSILLECTOMY       History reviewed. No pertinent family history.    Home Medications:  Prior to Admission medications    Medication Sig Start Date End Date Taking? Authorizing Provider   calcitriol (ROCALTROL) 0.5 MCG capsule Take 1 mcg by mouth 4 (Four) Times a Day.    ProviderLivier MD   Calcium Citrate-Vitamin D3 (CITRACAL) 315-6.25 MG-MCG tablet tablet Take 1 tablet by mouth Daily.    ProviderLivier MD   citalopram " "(CeleXA) 10 MG tablet Take 10 mg by mouth Daily.    Livier Walden MD   Ergocalciferol (VITAMIN D2 PO) Take 1.25 mg by mouth Daily.    Livier Walden MD   estradiol (ESTRACE) 2 MG tablet Take 2 mg by mouth Daily.    Livier Walden MD   famotidine (PEPCID) 20 MG tablet Take 20 mg by mouth 1 (One) Time.    Livier Walden MD   levothyroxine (SYNTHROID, LEVOTHROID) 200 MCG tablet Take 400 mcg by mouth Daily.    Livier Walden MD   loratadine (Claritin) 10 MG tablet Take 10 mg by mouth Daily.    Livier Walden MD   spironolactone (ALDACTONE) 50 MG tablet Take 50 mg by mouth Daily.    Livier Walden MD        Social History:   Social History     Tobacco Use   • Smoking status: Every Day     Packs/day: 0.50     Types: Cigarettes   Substance Use Topics   • Alcohol use: Yes     Comment: Socially   • Drug use: Never         Review of Systems:  Review of Systems   Constitutional: Negative for chills and fever.   HENT: Negative for congestion, ear pain and sore throat.    Eyes: Negative for pain.   Respiratory: Negative for cough, chest tightness and shortness of breath.    Cardiovascular: Negative for chest pain.   Gastrointestinal: Negative for abdominal pain, diarrhea, nausea and vomiting.   Genitourinary: Negative for flank pain and hematuria.   Musculoskeletal: Negative for joint swelling.   Skin: Negative for pallor.   Neurological: Positive for weakness and numbness. Negative for tremors, seizures and headaches.   All other systems reviewed and are negative.       Physical Exam:  /75 (BP Location: Left arm, Patient Position: Sitting)   Pulse 56   Temp 97.6 °F (36.4 °C) (Oral)   Resp 20   Ht 160 cm (63\")   Wt 120 kg (263 lb 10.7 oz)   SpO2 100%   BMI 46.71 kg/m²     Physical Exam  Vitals and nursing note reviewed.   Constitutional:       General: She is not in acute distress.     Appearance: Normal appearance. She is not toxic-appearing.   HENT:      Head: " Normocephalic and atraumatic.      Mouth/Throat:      Mouth: Mucous membranes are moist.   Eyes:      General: No scleral icterus.  Cardiovascular:      Rate and Rhythm: Normal rate and regular rhythm.      Pulses: Normal pulses.      Heart sounds: Normal heart sounds.   Pulmonary:      Effort: Pulmonary effort is normal. No respiratory distress.      Breath sounds: Normal breath sounds.   Abdominal:      General: Abdomen is flat.      Palpations: Abdomen is soft.      Tenderness: There is no abdominal tenderness.   Musculoskeletal:         General: Normal range of motion.      Cervical back: Normal range of motion and neck supple.   Skin:     General: Skin is warm and dry.      Capillary Refill: Capillary refill takes less than 2 seconds.   Neurological:      Mental Status: She is alert and oriented to person, place, and time. Mental status is at baseline.      Comments: Positive  [place] sign   Psychiatric:         Mood and Affect: Mood normal.         Behavior: Behavior normal.         Thought Content: Thought content normal.         Judgment: Judgment normal.                  Procedures:  Procedures      Medical Decision Making:      Comorbidities that affect care:    Thyroid Disease    External Notes reviewed:    Previous ED Note: for similar symptoms      The following orders were placed and all results were independently analyzed by me:  Orders Placed This Encounter   Procedures   • Comprehensive Metabolic Panel   • Speculator Draw   • CBC Auto Differential   • ECG 12 Lead Rhythm Change   • CBC & Differential   • Green Top (Gel)   • Lavender Top   • Gold Top - SST   • Light Blue Top       Medications Given in the Emergency Department:  Medications   sodium chloride 0.9 % bolus 500 mL (0 mL Intravenous Stopped 3/31/23 1404)   calcium gluconate 1g/50ml 0.675% NaCl IV SOLN (0 g Intravenous Stopped 3/31/23 1258)   calcium gluconate 1g/50ml 0.675% NaCl IV SOLN (0 g Intravenous Stopped 3/31/23 1333)        ED  Course:         Labs:    Lab Results (last 24 hours)     ** No results found for the last 24 hours. **           Imaging:    No Radiology Exams Resulted Within Past 24 Hours      Differential Diagnosis and Discussion:    Weakness: Based on the patient's history, signs, and symptoms, the diffential diagnosis includes but is not limited to meningitis, stroke, sepsis, subarachnoid hemorrhage, intracranial bleeding, encephalitis, acute uti, dehydration, MS, myasthenia gravis, Guillan [place], migraine variant, neuromuscular disorders vertigo, electrolyte imbalance, and metabolic disorders.    All labs were reviewed and interpreted by me.  EKG was interpreted by me.    MDM         Patient Care Considerations:          Consultants/Shared Management Plan:    None    Social Determinants of Health:    Patient is independent, reliable, and has access to care.       Disposition and Care Coordination:    Discharged: The patient is suitable and stable for discharge with no need for consideration of observation or admission.    I have explained discharge medications and the need for follow up with the patient/caretakers. This was also printed in the discharge instructions. Patient was discharged with the following medications and follow up:      Medication List      No changes were made to your prescriptions during this visit.      Johny Vences MD  [address] [place] [address]  [phone]    Today  call for appointment       Final diagnoses:   Hypocalcemia        ED Disposition     ED Disposition   Discharge    Condition   Stable    Comment   --             This medical record created using voice recognition software.           Lance Pace DO  04/01/23 0137

## 2023-04-01 LAB — QT INTERVAL: 531 MS

## 2023-04-23 ENCOUNTER — HOSPITAL ENCOUNTER (EMERGENCY)
Facility: HOSPITAL | Age: 43
Discharge: LEFT BEFORE DISCHARGE | End: 2023-04-23
Attending: STUDENT IN AN ORGANIZED HEALTH CARE EDUCATION/TRAINING PROGRAM | Admitting: STUDENT IN AN ORGANIZED HEALTH CARE EDUCATION/TRAINING PROGRAM
Payer: MEDICAID

## 2023-04-23 ENCOUNTER — APPOINTMENT (OUTPATIENT)
Dept: GENERAL RADIOLOGY | Facility: HOSPITAL | Age: 43
End: 2023-04-23
Payer: MEDICAID

## 2023-04-23 VITALS
TEMPERATURE: 97.6 F | SYSTOLIC BLOOD PRESSURE: 140 MMHG | HEIGHT: 63 IN | RESPIRATION RATE: 16 BRPM | WEIGHT: 268.52 LBS | BODY MASS INDEX: 47.58 KG/M2 | HEART RATE: 71 BPM | DIASTOLIC BLOOD PRESSURE: 88 MMHG | OXYGEN SATURATION: 96 %

## 2023-04-23 DIAGNOSIS — E83.51 HYPOCALCEMIA: Primary | ICD-10-CM

## 2023-04-23 LAB
ALBUMIN SERPL-MCNC: 4 G/DL (ref 3.5–5.2)
ALBUMIN SERPL-MCNC: 4 G/DL (ref 3.5–5.2)
ALBUMIN/GLOB SERPL: 1.2 G/DL
ALBUMIN/GLOB SERPL: 1.2 G/DL
ALP SERPL-CCNC: 111 U/L (ref 39–117)
ALP SERPL-CCNC: 115 U/L (ref 39–117)
ALT SERPL W P-5'-P-CCNC: 13 U/L (ref 1–33)
ALT SERPL W P-5'-P-CCNC: 14 U/L (ref 1–33)
ANION GAP SERPL CALCULATED.3IONS-SCNC: 13.1 MMOL/L (ref 5–15)
ANION GAP SERPL CALCULATED.3IONS-SCNC: 15 MMOL/L (ref 5–15)
AST SERPL-CCNC: 23 U/L (ref 1–32)
AST SERPL-CCNC: 23 U/L (ref 1–32)
BASOPHILS # BLD AUTO: 0.06 10*3/MM3 (ref 0–0.2)
BASOPHILS NFR BLD AUTO: 0.7 % (ref 0–1.5)
BILIRUB SERPL-MCNC: 0.2 MG/DL (ref 0–1.2)
BILIRUB SERPL-MCNC: 0.2 MG/DL (ref 0–1.2)
BUN SERPL-MCNC: 22 MG/DL (ref 6–20)
BUN SERPL-MCNC: 23 MG/DL (ref 6–20)
BUN/CREAT SERPL: 13.5 (ref 7–25)
BUN/CREAT SERPL: 15.3 (ref 7–25)
CALCIUM SPEC-SCNC: 5.1 MG/DL (ref 8.6–10.5)
CALCIUM SPEC-SCNC: 6.6 MG/DL (ref 8.6–10.5)
CHLORIDE SERPL-SCNC: 98 MMOL/L (ref 98–107)
CHLORIDE SERPL-SCNC: 98 MMOL/L (ref 98–107)
CO2 SERPL-SCNC: 28 MMOL/L (ref 22–29)
CO2 SERPL-SCNC: 29.9 MMOL/L (ref 22–29)
CREAT SERPL-MCNC: 1.44 MG/DL (ref 0.57–1)
CREAT SERPL-MCNC: 1.71 MG/DL (ref 0.57–1)
DEPRECATED RDW RBC AUTO: 47.6 FL (ref 37–54)
EGFRCR SERPLBLD CKD-EPI 2021: 38 ML/MIN/1.73
EGFRCR SERPLBLD CKD-EPI 2021: 46.7 ML/MIN/1.73
EOSINOPHIL # BLD AUTO: 0.43 10*3/MM3 (ref 0–0.4)
EOSINOPHIL NFR BLD AUTO: 4.8 % (ref 0.3–6.2)
ERYTHROCYTE [DISTWIDTH] IN BLOOD BY AUTOMATED COUNT: 15.7 % (ref 12.3–15.4)
GLOBULIN UR ELPH-MCNC: 3.4 GM/DL
GLOBULIN UR ELPH-MCNC: 3.4 GM/DL
GLUCOSE SERPL-MCNC: 85 MG/DL (ref 65–99)
GLUCOSE SERPL-MCNC: 88 MG/DL (ref 65–99)
HCT VFR BLD AUTO: 39.5 % (ref 34–46.6)
HGB BLD-MCNC: 13.2 G/DL (ref 12–15.9)
HOLD SPECIMEN: NORMAL
HOLD SPECIMEN: NORMAL
IMM GRANULOCYTES # BLD AUTO: 0.02 10*3/MM3 (ref 0–0.05)
IMM GRANULOCYTES NFR BLD AUTO: 0.2 % (ref 0–0.5)
LYMPHOCYTES # BLD AUTO: 2.37 10*3/MM3 (ref 0.7–3.1)
LYMPHOCYTES NFR BLD AUTO: 26.4 % (ref 19.6–45.3)
MAGNESIUM SERPL-MCNC: 1.6 MG/DL (ref 1.6–2.6)
MCH RBC QN AUTO: 27.7 PG (ref 26.6–33)
MCHC RBC AUTO-ENTMCNC: 33.4 G/DL (ref 31.5–35.7)
MCV RBC AUTO: 83 FL (ref 79–97)
MONOCYTES # BLD AUTO: 0.52 10*3/MM3 (ref 0.1–0.9)
MONOCYTES NFR BLD AUTO: 5.8 % (ref 5–12)
NEUTROPHILS NFR BLD AUTO: 5.59 10*3/MM3 (ref 1.7–7)
NEUTROPHILS NFR BLD AUTO: 62.1 % (ref 42.7–76)
NRBC BLD AUTO-RTO: 0 /100 WBC (ref 0–0.2)
PLATELET # BLD AUTO: 233 10*3/MM3 (ref 140–450)
PMV BLD AUTO: 11.6 FL (ref 6–12)
POTASSIUM SERPL-SCNC: 3.8 MMOL/L (ref 3.5–5.2)
POTASSIUM SERPL-SCNC: 4.1 MMOL/L (ref 3.5–5.2)
PROT SERPL-MCNC: 7.4 G/DL (ref 6–8.5)
PROT SERPL-MCNC: 7.4 G/DL (ref 6–8.5)
QT INTERVAL: 462 MS
RBC # BLD AUTO: 4.76 10*6/MM3 (ref 3.77–5.28)
SODIUM SERPL-SCNC: 141 MMOL/L (ref 136–145)
SODIUM SERPL-SCNC: 141 MMOL/L (ref 136–145)
TROPONIN T SERPL HS-MCNC: 8 NG/L
WBC NRBC COR # BLD: 8.99 10*3/MM3 (ref 3.4–10.8)
WHOLE BLOOD HOLD COAG: NORMAL
WHOLE BLOOD HOLD SPECIMEN: NORMAL

## 2023-04-23 PROCEDURE — 25010000002 CALCIUM GLUCONATE 2-0.675 GM/100ML-% SOLUTION: Performed by: STUDENT IN AN ORGANIZED HEALTH CARE EDUCATION/TRAINING PROGRAM

## 2023-04-23 PROCEDURE — 99284 EMERGENCY DEPT VISIT MOD MDM: CPT

## 2023-04-23 PROCEDURE — 84484 ASSAY OF TROPONIN QUANT: CPT

## 2023-04-23 PROCEDURE — 85025 COMPLETE CBC W/AUTO DIFF WBC: CPT

## 2023-04-23 PROCEDURE — 83735 ASSAY OF MAGNESIUM: CPT

## 2023-04-23 PROCEDURE — 25010000002 CALCIUM GLUCONATE-NACL 1-0.675 GM/50ML-% SOLUTION: Performed by: STUDENT IN AN ORGANIZED HEALTH CARE EDUCATION/TRAINING PROGRAM

## 2023-04-23 PROCEDURE — 80053 COMPREHEN METABOLIC PANEL: CPT

## 2023-04-23 PROCEDURE — 80053 COMPREHEN METABOLIC PANEL: CPT | Performed by: STUDENT IN AN ORGANIZED HEALTH CARE EDUCATION/TRAINING PROGRAM

## 2023-04-23 PROCEDURE — 93005 ELECTROCARDIOGRAM TRACING: CPT

## 2023-04-23 PROCEDURE — 96365 THER/PROPH/DIAG IV INF INIT: CPT

## 2023-04-23 PROCEDURE — 93005 ELECTROCARDIOGRAM TRACING: CPT | Performed by: STUDENT IN AN ORGANIZED HEALTH CARE EDUCATION/TRAINING PROGRAM

## 2023-04-23 PROCEDURE — 96366 THER/PROPH/DIAG IV INF ADDON: CPT

## 2023-04-23 PROCEDURE — 71045 X-RAY EXAM CHEST 1 VIEW: CPT

## 2023-04-23 PROCEDURE — 99283 EMERGENCY DEPT VISIT LOW MDM: CPT

## 2023-04-23 RX ORDER — SODIUM CHLORIDE 0.9 % (FLUSH) 0.9 %
10 SYRINGE (ML) INJECTION AS NEEDED
Status: DISCONTINUED | OUTPATIENT
Start: 2023-04-23 | End: 2023-04-23 | Stop reason: HOSPADM

## 2023-04-23 RX ORDER — CALCIUM GLUCONATE 20 MG/ML
2 INJECTION, SOLUTION INTRAVENOUS ONCE
Status: COMPLETED | OUTPATIENT
Start: 2023-04-23 | End: 2023-04-23

## 2023-04-23 RX ORDER — CALCIUM GLUCONATE 20 MG/ML
1 INJECTION, SOLUTION INTRAVENOUS ONCE
Status: COMPLETED | OUTPATIENT
Start: 2023-04-23 | End: 2023-04-23

## 2023-04-23 RX ORDER — CALCIUM GLUCONATE 20 MG/ML
1 INJECTION, SOLUTION INTRAVENOUS ONCE
Status: DISCONTINUED | OUTPATIENT
Start: 2023-04-23 | End: 2023-04-23

## 2023-04-23 RX ADMIN — CALCIUM GLUCONATE 2 G: 20 INJECTION, SOLUTION INTRAVENOUS at 18:22

## 2023-04-23 RX ADMIN — CALCIUM GLUCONATE 1 G: 20 INJECTION, SOLUTION INTRAVENOUS at 17:46

## 2023-04-23 NOTE — ED PROVIDER NOTES
Time: 4:33 PM EDT  Date of encounter:  2023  Independent Historian/Clinical History and Information was obtained by:   Patient  Chief Complaint   Patient presents with   • Weakness - Generalized       History is limited by: N/A    History of Present Illness:  Patient is a 42 y.o. year old female who presents to the emergency department for evaluation of weakness. Patient states she has a history of hypocalcemia but is concerned that her calcium is low. Patient does state she has been taking her PO calcium as prescribed. Patient admits to symptoms of body heaviness, muscle spasms, numbness. She states she had her thyroids removed and hx of gastric bypass. Pt states her usual calcium levels are from 5.8 - 6.2. Pt states that she has poor calcium absorption.    HPI    Patient Care Team  Primary Care Provider: Provider, Shelly Known    Past Medical History:     Allergies   Allergen Reactions   • Latex Rash     Past Medical History:   Diagnosis Date   • Disease of thyroid gland    • Hypocalcemia      Past Surgical History:   Procedure Laterality Date   • ADENOIDECTOMY     • CARPAL TUNNEL RELEASE     •  SECTION     • GASTRIC BYPASS     • HYSTERECTOMY     • THYROID SURGERY     • TONSILLECTOMY       No family history on file.    Home Medications:  Prior to Admission medications    Medication Sig Start Date End Date Taking? Authorizing Provider   calcitriol (ROCALTROL) 0.5 MCG capsule Take 1 mcg by mouth 4 (Four) Times a Day.    Livier Walden MD   Calcium Citrate-Vitamin D3 (CITRACAL) 315-6.25 MG-MCG tablet tablet Take 1 tablet by mouth Daily.    Livier Walden MD   citalopram (CeleXA) 10 MG tablet Take 10 mg by mouth Daily.    Livier Walden MD   Ergocalciferol (VITAMIN D2 PO) Take 1.25 mg by mouth Daily.    Livier Walden MD   estradiol (ESTRACE) 2 MG tablet Take 2 mg by mouth Daily.    Livier Walden MD   famotidine (PEPCID) 20 MG tablet Take 20 mg by mouth 1 (One) Time.     "Livier Walden MD   levothyroxine (SYNTHROID, LEVOTHROID) 200 MCG tablet Take 400 mcg by mouth Daily.    Livier Walden MD   loratadine (Claritin) 10 MG tablet Take 10 mg by mouth Daily.    Livier Walden MD   spironolactone (ALDACTONE) 50 MG tablet Take 50 mg by mouth Daily.    Livier Walden MD        Social History:   Social History     Tobacco Use   • Smoking status: Every Day     Packs/day: 0.50     Types: Cigarettes   Substance Use Topics   • Alcohol use: Yes     Comment: Socially   • Drug use: Never         Review of Systems:  Review of Systems   Constitutional: Negative for chills and fever.   HENT: Negative for congestion, rhinorrhea and sore throat.    Eyes: Negative for pain and visual disturbance.   Respiratory: Negative for apnea, cough, chest tightness and shortness of breath.    Cardiovascular: Negative for chest pain and palpitations.   Gastrointestinal: Negative for abdominal pain, diarrhea, nausea and vomiting.   Genitourinary: Negative for difficulty urinating and dysuria.   Musculoskeletal: Positive for myalgias. Negative for joint swelling.   Skin: Negative for color change.   Neurological: Positive for weakness and numbness. Negative for seizures and headaches.   Psychiatric/Behavioral: Negative.    All other systems reviewed and are negative.       Physical Exam:  /88 (BP Location: Right arm, Patient Position: Lying)   Pulse 71   Temp 97.6 °F (36.4 °C) (Oral)   Resp 16   Ht 160 cm (63\")   Wt 122 kg (268 lb 8.3 oz)   SpO2 96%   BMI 47.57 kg/m²     Physical Exam  HENT:      Head: Normocephalic.   Eyes:      Extraocular Movements: Extraocular movements intact.      Pupils: Pupils are equal, round, and reactive to light.   Cardiovascular:      Rate and Rhythm: Normal rate.      Heart sounds: No murmur heard.  Pulmonary:      Effort: Pulmonary effort is normal. No respiratory distress.      Breath sounds: Normal breath sounds. No wheezing.   Abdominal:      " General: Abdomen is flat. There is no distension.      Tenderness: There is no abdominal tenderness.   Skin:     General: Skin is warm.   Neurological:      General: No focal deficit present.      Mental Status: She is alert.   Psychiatric:         Mood and Affect: Mood normal.                  Procedures:  Procedures      Medical Decision Making:      Comorbidities that affect care:    Hypocalcemia , Thyroid Disease    External Notes reviewed:    None      The following orders were placed and all results were independently analyzed by me:  Orders Placed This Encounter   Procedures   • XR Chest 1 View   • [place] Draw   • Comprehensive Metabolic Panel   • Single High Sensitivity Troponin T   • Magnesium   • Urinalysis With Microscopic If Indicated (No Culture) - Urine, Clean Catch   • CBC Auto Differential   • Comprehensive Metabolic Panel   • NPO Diet NPO Type: Strict NPO   • Undress & Gown   • Cardiac Monitoring   • Continuous Pulse Oximetry   • Vital Signs   • Orthostatic Blood Pressure   • Oxygen Therapy- Nasal Cannula; 2 LPM; Titrate for SPO2: 92%, Greater Than or Equal To   • POC Glucose Once   • ECG 12 Lead ED Triage Standing Order; Weak / Dizzy / AMS   • Insert Peripheral IV   • Fall Precautions   • CBC & Differential   • Green Top (Gel)   • Lavender Top   • Gold Top - SST   • Light Blue Top       Medications Given in the Emergency Department:  Medications   sodium chloride 0.9 % flush 10 mL (has no administration in time range)   calcium gluconate 2-0.675 GM/100ML NACL IVPB (0 g Intravenous Stopped 4/23/23 1914)   calcium gluconate 1g/50ml 0.675% NaCl IV SOLN (0 g Intravenous Stopped 4/23/23 1821)        ED Course:    The patient was initially evaluated in the triage area where orders were placed. The patient was later dispositioned by Cristina Patel MD.      The patient was advised to stay for completion of workup which includes but is not limited to communication of labs and radiological results,  reassessment and plan. The patient was advised that leaving prior to disposition by a provider could result in critical findings that are not communicated to the patient.     ED Course as of 04/23/23 2043   Sun Apr 23, 2023   1634 PROVIDER IN TRIAGE  Patient was evaluated by me in triage, Henrry Wade PA-C.  Orders were placed and patient is currently awaiting final results and disposition.  [MD]      ED Course User Index  [MD] Henrry Wade PA-C       Labs:    Lab Results (last 24 hours)     Procedure Component Value Units Date/Time    CBC & Differential [[id number]]  (Abnormal) Collected: 04/23/23 1608    Specimen: Blood Updated: 04/23/23 1613    Narrative:      The following orders were created for panel order CBC & Differential.  Procedure                               Abnormality         Status                     ---------                               -----------         ------                     CBC Auto Differential[[id number]]        Abnormal            Final result                 Please view results for these tests on the individual orders.    Comprehensive Metabolic Panel [[id number]]  (Abnormal) Collected: 04/23/23 1608    Specimen: Blood Updated: 04/23/23 1650     Glucose 88 mg/dL      BUN 23 mg/dL      Creatinine 1.71 mg/dL      Sodium 141 mmol/L      Potassium 4.1 mmol/L      Chloride 98 mmol/L      CO2 28.0 mmol/L      Calcium 5.1 mg/dL      Total Protein 7.4 g/dL      Albumin 4.0 g/dL      ALT (SGPT) 13 U/L      AST (SGOT) 23 U/L      Alkaline Phosphatase 115 U/L      Total Bilirubin 0.2 mg/dL      Globulin 3.4 gm/dL      A/G Ratio 1.2 g/dL      BUN/Creatinine Ratio 13.5     Anion Gap 15.0 mmol/L      eGFR 38.0 mL/min/1.73     Narrative:      GFR Normal >60  Chronic Kidney Disease <60  Kidney Failure <15      Single High Sensitivity Troponin T [[id number]]  (Normal) Collected: 04/23/23 1608    Specimen: Blood Updated: 04/23/23 1643     HS Troponin T 8 ng/L     Narrative:      High Sensitive  Troponin T Reference Range:  <10.0 ng/L- Negative Female for AMI  <15.0 ng/L- Negative Male for AMI  >=10 - Abnormal Female indicating possible myocardial injury.  >=15 - Abnormal Male indicating possible myocardial injury.   Clinicians would have to utilize clinical acumen, EKG, Troponin, and serial changes to determine if it is an Acute Myocardial Infarction or myocardial injury due to an underlying chronic condition.         Magnesium [[id number]]  (Normal) Collected: 04/23/23 1608    Specimen: Blood Updated: 04/23/23 1650     Magnesium 1.6 mg/dL     CBC Auto Differential [[id number]]  (Abnormal) Collected: 04/23/23 1608    Specimen: Blood Updated: 04/23/23 1613     WBC 8.99 10*3/mm3      RBC 4.76 10*6/mm3      Hemoglobin 13.2 g/dL      Hematocrit 39.5 %      MCV 83.0 fL      MCH 27.7 pg      MCHC 33.4 g/dL      RDW 15.7 %      RDW-SD 47.6 fl      MPV 11.6 fL      Platelets 233 10*3/mm3      Neutrophil % 62.1 %      Lymphocyte % 26.4 %      Monocyte % 5.8 %      Eosinophil % 4.8 %      Basophil % 0.7 %      Immature Grans % 0.2 %      Neutrophils, Absolute 5.59 10*3/mm3      Lymphocytes, Absolute 2.37 10*3/mm3      Monocytes, Absolute 0.52 10*3/mm3      Eosinophils, Absolute 0.43 10*3/mm3      Basophils, Absolute 0.06 10*3/mm3      Immature Grans, Absolute 0.02 10*3/mm3      nRBC 0.0 /100 WBC     Comprehensive Metabolic Panel [[id number]]  (Abnormal) Collected: 04/23/23 1928    Specimen: Blood from Arm, Right Updated: 04/23/23 2011     Glucose 85 mg/dL      BUN 22 mg/dL      Creatinine 1.44 mg/dL      Sodium 141 mmol/L      Potassium 3.8 mmol/L      Chloride 98 mmol/L      CO2 29.9 mmol/L      Calcium 6.6 mg/dL      Total Protein 7.4 g/dL      Albumin 4.0 g/dL      ALT (SGPT) 14 U/L      AST (SGOT) 23 U/L      Alkaline Phosphatase 111 U/L      Total Bilirubin 0.2 mg/dL      Globulin 3.4 gm/dL      A/G Ratio 1.2 g/dL      BUN/Creatinine Ratio 15.3     Anion Gap 13.1 mmol/L      eGFR 46.7 mL/min/1.73     Narrative:       GFR Normal >60  Chronic Kidney Disease <60  Kidney Failure <15             Imaging:    XR Chest 1 View    Result Date: 4/23/2023  PROCEDURE: XR CHEST 1 VW  COMPARISON: [facility], CR, CHEST PA/AP & LAT 2V, 11/09/2016, 21:25.  INDICATIONS: Weak/Dizzy/AMS triage protocol  FINDINGS:  There is no pneumothorax, pleural effusion or focal airspace consolidation. The heart size and pulmonary vasculature appear within normal limits. There are no acute osseous abnormalities.       No acute cardiopulmonary abnormality.       OZZIE JOHNSON MD       Electronically Signed and Approved By: OZZIE JOHNSON MD on 4/23/2023 at 16:50                 Differential Diagnosis and Discussion:      Metabolic: Differential diagnosis includes but is not limited to hypertension, hyperglycemia, hyperkalemia, hypocalcemia, metabolic acidosis, hypokalemia, hypoglycemia, malnutrition, hypothyroidism, hyperthyroidism, and adrenal insufficiency.   Weakness: Based on the patient's history, signs, and symptoms, the diffential diagnosis includes but is not limited to meningitis, stroke, sepsis, subarachnoid hemorrhage, intracranial bleeding, encephalitis, acute uti, dehydration, MS, myasthenia gravis, Guillan [place], migraine variant, neuromuscular disorders vertigo, electrolyte imbalance, and metabolic disorders.    All labs were reviewed and interpreted by me.  All X-rays impressions were independently interpreted by me.  EKG was interpreted by me.    MDM     Patient's initial calcium was 5.1.  Patient received 3 g of calcium and her calcium came up to 6.6.  I discussed with her that I did not feel comfortable discharging her.  She states she has a long history of hypocalcemia and treatment at home with calcium.  She would like to leave A.  Patient understands the risks.  Patient states she will follow-up with an endocrinologist.      Consultants/Shared Management Plan:    None    Social Determinants of Health:    Patient is  independent, reliable, and has access to care.       Disposition and Care Coordination:    AMA:         Final diagnoses:   Hypocalcemia        ED Disposition     ED Disposition   AMA    Condition   --    Comment   --             This medical record created using voice recognition software.    Documentation assistance provided by Anoop Villarreal, acting as scribe for Cristina Patel MD. Information recorded by the scribe was done at my direction and has been verified and validated by me.       Anoop Villarreal  04/23/23 1730       Anoop Villarreal  04/23/23 1731       Cristina Patel MD  04/23/23 9842

## 2023-05-07 LAB — QT INTERVAL: 462 MS

## 2023-05-25 PROCEDURE — 87081 CULTURE SCREEN ONLY: CPT | Performed by: PHYSICIAN ASSISTANT

## 2023-06-02 ENCOUNTER — HOSPITAL ENCOUNTER (EMERGENCY)
Facility: HOSPITAL | Age: 43
Discharge: HOME | End: 2023-06-02
Attending: EMERGENCY MEDICINE
Payer: MEDICAID

## 2023-06-02 VITALS
HEIGHT: 63 IN | BODY MASS INDEX: 51.05 KG/M2 | OXYGEN SATURATION: 90 % | RESPIRATION RATE: 16 BRPM | DIASTOLIC BLOOD PRESSURE: 83 MMHG | WEIGHT: 288.14 LBS | SYSTOLIC BLOOD PRESSURE: 100 MMHG | HEART RATE: 65 BPM | TEMPERATURE: 97.8 F

## 2023-06-02 DIAGNOSIS — E83.51 HYPOCALCEMIA: Primary | ICD-10-CM

## 2023-06-02 LAB
ALBUMIN SERPL-MCNC: 4.2 G/DL (ref 3.5–5.2)
ALBUMIN/GLOB SERPL: 1.4 G/DL
ALP SERPL-CCNC: 91 U/L (ref 39–117)
ALT SERPL W P-5'-P-CCNC: 28 U/L (ref 1–33)
ANION GAP SERPL CALCULATED.3IONS-SCNC: 15.1 MMOL/L (ref 5–15)
AST SERPL-CCNC: 38 U/L (ref 1–32)
BASOPHILS # BLD AUTO: 0.05 10*3/MM3 (ref 0–0.2)
BASOPHILS NFR BLD AUTO: 0.8 % (ref 0–1.5)
BILIRUB SERPL-MCNC: 0.3 MG/DL (ref 0–1.2)
BUN SERPL-MCNC: 21 MG/DL (ref 6–20)
BUN/CREAT SERPL: 16.2 (ref 7–25)
CALCIUM SPEC-SCNC: 5.5 MG/DL (ref 8.6–10.5)
CHLORIDE SERPL-SCNC: 100 MMOL/L (ref 98–107)
CO2 SERPL-SCNC: 26.9 MMOL/L (ref 22–29)
CREAT SERPL-MCNC: 1.3 MG/DL (ref 0.57–1)
DEPRECATED RDW RBC AUTO: 57.4 FL (ref 37–54)
EGFRCR SERPLBLD CKD-EPI 2021: 52.8 ML/MIN/1.73
EOSINOPHIL # BLD AUTO: 0.22 10*3/MM3 (ref 0–0.4)
EOSINOPHIL NFR BLD AUTO: 3.6 % (ref 0.3–6.2)
ERYTHROCYTE [DISTWIDTH] IN BLOOD BY AUTOMATED COUNT: 18 % (ref 12.3–15.4)
GLOBULIN UR ELPH-MCNC: 3 GM/DL
GLUCOSE SERPL-MCNC: 93 MG/DL (ref 65–99)
HCT VFR BLD AUTO: 38.4 % (ref 34–46.6)
HGB BLD-MCNC: 14 G/DL (ref 12–15.9)
HOLD SPECIMEN: NORMAL
HOLD SPECIMEN: NORMAL
IMM GRANULOCYTES # BLD AUTO: 0.02 10*3/MM3 (ref 0–0.05)
IMM GRANULOCYTES NFR BLD AUTO: 0.3 % (ref 0–0.5)
LYMPHOCYTES # BLD AUTO: 1.85 10*3/MM3 (ref 0.7–3.1)
LYMPHOCYTES NFR BLD AUTO: 30.5 % (ref 19.6–45.3)
MCH RBC QN AUTO: 31.7 PG (ref 26.6–33)
MCHC RBC AUTO-ENTMCNC: 36.5 G/DL (ref 31.5–35.7)
MCV RBC AUTO: 87.1 FL (ref 79–97)
MONOCYTES # BLD AUTO: 0.28 10*3/MM3 (ref 0.1–0.9)
MONOCYTES NFR BLD AUTO: 4.6 % (ref 5–12)
NEUTROPHILS NFR BLD AUTO: 3.64 10*3/MM3 (ref 1.7–7)
NEUTROPHILS NFR BLD AUTO: 60.2 % (ref 42.7–76)
NRBC BLD AUTO-RTO: 0 /100 WBC (ref 0–0.2)
PLATELET # BLD AUTO: 168 10*3/MM3 (ref 140–450)
PMV BLD AUTO: 12.8 FL (ref 6–12)
POTASSIUM SERPL-SCNC: 4.4 MMOL/L (ref 3.5–5.2)
PROT SERPL-MCNC: 7.2 G/DL (ref 6–8.5)
RBC # BLD AUTO: 4.41 10*6/MM3 (ref 3.77–5.28)
SODIUM SERPL-SCNC: 142 MMOL/L (ref 136–145)
WBC NRBC COR # BLD: 6.06 10*3/MM3 (ref 3.4–10.8)
WHOLE BLOOD HOLD COAG: NORMAL
WHOLE BLOOD HOLD SPECIMEN: NORMAL

## 2023-06-02 PROCEDURE — 93005 ELECTROCARDIOGRAM TRACING: CPT | Performed by: NURSE PRACTITIONER

## 2023-06-02 PROCEDURE — 99283 EMERGENCY DEPT VISIT LOW MDM: CPT

## 2023-06-02 PROCEDURE — 25010000002 CALCIUM GLUCONATE 2-0.675 GM/100ML-% SOLUTION: Performed by: EMERGENCY MEDICINE

## 2023-06-02 PROCEDURE — 36415 COLL VENOUS BLD VENIPUNCTURE: CPT

## 2023-06-02 PROCEDURE — 96365 THER/PROPH/DIAG IV INF INIT: CPT

## 2023-06-02 PROCEDURE — 80053 COMPREHEN METABOLIC PANEL: CPT | Performed by: NURSE PRACTITIONER

## 2023-06-02 PROCEDURE — 85025 COMPLETE CBC W/AUTO DIFF WBC: CPT | Performed by: NURSE PRACTITIONER

## 2023-06-02 PROCEDURE — 82330 ASSAY OF CALCIUM: CPT | Performed by: NURSE PRACTITIONER

## 2023-06-02 PROCEDURE — 96366 THER/PROPH/DIAG IV INF ADDON: CPT

## 2023-06-02 RX ORDER — CALCIUM GLUCONATE 20 MG/ML
2 INJECTION, SOLUTION INTRAVENOUS ONCE
Status: COMPLETED | OUTPATIENT
Start: 2023-06-02 | End: 2023-06-02

## 2023-06-02 RX ORDER — SODIUM CHLORIDE 0.9 % (FLUSH) 0.9 %
10 SYRINGE (ML) INJECTION AS NEEDED
Status: DISCONTINUED | OUTPATIENT
Start: 2023-06-02 | End: 2023-06-02 | Stop reason: HOSPADM

## 2023-06-02 RX ADMIN — CALCIUM GLUCONATE 2 G: 20 INJECTION, SOLUTION INTRAVENOUS at 16:35

## 2023-06-02 NOTE — ED PROVIDER NOTES
Time: 3:04 PM EDT  Date of encounter:  2023  Independent Historian/Clinical History and Information was obtained by:   Patient  Chief Complaint   Patient presents with   • Abnormal Lab     Thinks her calcium is low       History is limited by: N/A    History of Present Illness:  Patient is a 42 y.o. year old female who presents to the emergency department for evaluation of possible low calcium. Patient reports recurrent issues with her calcium and has to be seen here frequently for calcium infusion. Thyroidectomy in the past. [place] and has not found endocrinologist. Reports eye twitching, hand tingling and hand lindsay. Calcium is at 5.5 today. She has tingling and face twitching.    HPI    Patient Care Team  Primary Care Provider: Provider, No Known    Past Medical History:     Allergies   Allergen Reactions   • Latex Rash and Itching     Past Medical History:   Diagnosis Date   • Disease of thyroid gland    • Hypocalcemia      Past Surgical History:   Procedure Laterality Date   • ADENOIDECTOMY     • CARPAL TUNNEL RELEASE     •  SECTION     • GASTRIC BYPASS     • HYSTERECTOMY     • THYROID SURGERY     • TONSILLECTOMY       History reviewed. No pertinent family history.    Home Medications:  Prior to Admission medications    Medication Sig Start Date End Date Taking? Authorizing Provider   brompheniramine-pseudoephedrine-DM 30-2-10 MG/5ML syrup Take 10 mL by mouth 4 (Four) Times a Day As Needed for Congestion, Cough or Allergies. 23   Diomedes Amaro PA   busPIRone (BUSPAR) 7.5 MG tablet Take 1 tablet by mouth.    ProviderLivier MD   calcitriol (ROCALTROL) 0.5 MCG capsule Take 1 mcg by mouth 4 (Four) Times a Day.    Livier Walden MD   Calcium Citrate-Vitamin D3 (CITRACAL) 315-6.25 MG-MCG tablet tablet Take 1 tablet by mouth Daily.    Livier Walden MD   ergocalciferol (ERGOCALCIFEROL) 1.25 MG (65851 UT) capsule Take 1 capsule by mouth Every 7  (Seven) Days. 11/10/22   Livier Walden MD   Ergocalciferol (VITAMIN D2 PO) Take 1.25 mg by mouth Daily.    Livier Walden MD   estradiol (ESTRACE) 2 MG tablet Take 2 mg by mouth Daily.    Livier Walden MD   estradiol (ESTRACE) 2 MG tablet estradiol 2 mg oral tablet take 1 tablet (2 mg) by oral route once daily for 30 days   Active    Livier Walden MD   levothyroxine (SYNTHROID, LEVOTHROID) 112 MCG tablet levothyroxine 112 mcg oral tablet take 1 tablet (112 mcg) by oral route once daily for 30 days   Suspended    Livier Walden MD   levothyroxine (SYNTHROID, LEVOTHROID) 200 MCG tablet Take 400 mcg by mouth Daily.    Livier Walden MD   levothyroxine (SYNTHROID, LEVOTHROID) 200 MCG tablet Take 1 tablet by mouth.    Livier Walden MD   loratadine (Claritin) 10 MG tablet Take 10 mg by mouth Daily.    Livier Walden MD   loratadine-pseudoephedrine (Claritin-D 24 Hour)  MG per 24 hr tablet Take 1 tablet by mouth Daily.    Livier Walden MD   spironolactone (ALDACTONE) 50 MG tablet Take 50 mg by mouth Daily.    Livier Walden MD        Social History:   Social History     Tobacco Use   • Smoking status: Every Day     Packs/day: 0.50     Types: Cigarettes   Vaping Use   • Vaping Use: Never used   Substance Use Topics   • Alcohol use: Yes     Comment: Socially   • Drug use: Never         Review of Systems:  Review of Systems   Constitutional: Negative for chills and fever.   HENT: Negative for congestion, rhinorrhea and sore throat.    Eyes: Negative for pain and visual disturbance.   Respiratory: Negative for apnea, cough, chest tightness and shortness of breath.    Cardiovascular: Negative for chest pain and palpitations.   Gastrointestinal: Negative for abdominal pain, diarrhea, nausea and vomiting.   Genitourinary: Negative for difficulty urinating and dysuria.   Musculoskeletal: Negative for joint swelling, myalgias and neck pain.        Hand  "lindsay   Skin: Negative for color change.   Neurological: Positive for tremors. Negative for seizures and headaches.   Psychiatric/Behavioral: Negative.    All other systems reviewed and are negative.       Physical Exam:  /83   Pulse 65   Temp 97.8 °F (36.6 °C) (Oral)   Resp 16   Ht 160 cm (62.99\")   Wt 131 kg (288 lb 2.3 oz)   SpO2 90%   BMI 51.05 kg/m²     Physical Exam  Vitals and nursing note reviewed.   Constitutional:       General: She is not in acute distress.     Appearance: Normal appearance. She is not toxic-appearing.   HENT:      Head: Normocephalic and atraumatic.      Jaw: There is normal jaw occlusion.      Mouth/Throat:      Mouth: Mucous membranes are moist.   Eyes:      General: Lids are normal.      Extraocular Movements: Extraocular movements intact.      Conjunctiva/sclera: Conjunctivae normal.      Pupils: Pupils are equal, round, and reactive to light.   Cardiovascular:      Rate and Rhythm: Normal rate and regular rhythm.      Pulses: Normal pulses.      Heart sounds: Normal heart sounds.   Pulmonary:      Effort: Pulmonary effort is normal. No respiratory distress.      Breath sounds: Normal breath sounds. No wheezing or rhonchi.   Abdominal:      General: Abdomen is flat.      Palpations: Abdomen is soft.      Tenderness: There is no abdominal tenderness. There is no guarding or rebound.   Musculoskeletal:         General: Normal range of motion.      Cervical back: Normal range of motion and neck supple.      Right lower leg: No edema.      Left lower leg: No edema.   Skin:     General: Skin is warm and dry.   Neurological:      General: No focal deficit present.      Mental Status: She is alert and oriented to person, place, and time. Mental status is at baseline.      Comments: Positive chvostek sign and positive Trousseau sign.   Psychiatric:         Mood and Affect: Mood normal.         Behavior: Behavior normal.                  Procedures:  Procedures      Medical " Decision Making:      Comorbidities that affect care:    Parathyroid disease    External Notes reviewed:    Previous Clinic Note: Urgent care evaluation for sore throat      The following orders were placed and all results were independently analyzed by me:  Orders Placed This Encounter   Procedures   • [place] Draw   • Comprehensive Metabolic Panel   • Calcium, Ionized   • CBC Auto Differential   • ECG 12 Lead Electrolyte Imbalance   • Insert peripheral IV   • Green Top (Gel)   • Lavender Top   • Gold Top - SST   • Light Blue Top   • CBC & Differential       Medications Given in the Emergency Department:  Medications   sodium chloride 0.9 % flush 10 mL (has no administration in time range)   calcium gluconate 2-0.675 GM/100ML NACL IVPB (0 g Intravenous Stopped 6/2/23 1812)        ED Course:    The patient was initially evaluated in the triage area where orders were placed. The patient was later dispositioned by Kahlil Rodriguez MD.      The patient was advised to stay for completion of workup which includes but is not limited to communication of labs and radiological results, reassessment and plan. The patient was advised that leaving prior to disposition by a provider could result in critical findings that are not communicated to the patient.     ED Course as of 06/02/23 1838 Fri Jun 02, 2023   1503 The patient was seen and examined by me, HOA Solorzano, while in triage. Orders placed. Patient is awaiting disposition.   [AR]      ED Course User Index  [AR] Lin Mckeon APRN       Labs:    Lab Results (last 24 hours)     Procedure Component Value Units Date/Time    CBC & Differential [[id number]]  (Abnormal) Collected: 06/02/23 1400    Specimen: Blood Updated: 06/02/23 1515    Narrative:      The following orders were created for panel order CBC & Differential.  Procedure                               Abnormality         Status                     ---------                               -----------          ------                     CBC Auto Differential[[id number]]        Abnormal            Final result                 Please view results for these tests on the individual orders.    Comprehensive Metabolic Panel [[id number]]  (Abnormal) Collected: 06/02/23 1400    Specimen: Blood Updated: 06/02/23 1534     Glucose 93 mg/dL      BUN 21 mg/dL      Creatinine 1.30 mg/dL      Sodium 142 mmol/L      Potassium 4.4 mmol/L      Chloride 100 mmol/L      CO2 26.9 mmol/L      Calcium 5.5 mg/dL      Total Protein 7.2 g/dL      Albumin 4.2 g/dL      ALT (SGPT) 28 U/L      AST (SGOT) 38 U/L      Alkaline Phosphatase 91 U/L      Total Bilirubin 0.3 mg/dL      Globulin 3.0 gm/dL      A/G Ratio 1.4 g/dL      BUN/Creatinine Ratio 16.2     Anion Gap 15.1 mmol/L      eGFR 52.8 mL/min/1.73     Narrative:      GFR Normal >60  Chronic Kidney Disease <60  Kidney Failure <15      CBC Auto Differential [[id number]]  (Abnormal) Collected: 06/02/23 1400    Specimen: Blood Updated: 06/02/23 1515     WBC 6.06 10*3/mm3      RBC 4.41 10*6/mm3      Hemoglobin 14.0 g/dL      Hematocrit 38.4 %      MCV 87.1 fL      MCH 31.7 pg      MCHC 36.5 g/dL      RDW 18.0 %      RDW-SD 57.4 fl      MPV 12.8 fL      Platelets 168 10*3/mm3      Neutrophil % 60.2 %      Lymphocyte % 30.5 %      Monocyte % 4.6 %      Eosinophil % 3.6 %      Basophil % 0.8 %      Immature Grans % 0.3 %      Neutrophils, Absolute 3.64 10*3/mm3      Lymphocytes, Absolute 1.85 10*3/mm3      Monocytes, Absolute 0.28 10*3/mm3      Eosinophils, Absolute 0.22 10*3/mm3      Basophils, Absolute 0.05 10*3/mm3      Immature Grans, Absolute 0.02 10*3/mm3      nRBC 0.0 /100 WBC     Calcium, Ionized [[id number]] Collected: 06/02/23 1631    Specimen: Blood Updated: 06/02/23 1736           Imaging:    No Radiology Exams Resulted Within Past 24 Hours      Differential Diagnosis and Discussion:      Metabolic: Differential diagnosis includes but is not limited to hypertension, hyperglycemia,  hyperkalemia, hypocalcemia, metabolic acidosis, hypokalemia, hypoglycemia, malnutrition, hypothyroidism, hyperthyroidism, and adrenal insufficiency.     All labs were reviewed and interpreted by me.    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  CBC independently reviewed by me and shows no critical abnormalities.  CMP reveals hypocalcemia at 5.5.  Patient has received IV calcium gluconate with improvement of her symptoms.  Very strict return to ER and follow-up instructions have been provided to the patient.             Patient Care Considerations:    CONSULT: I considered consulting Endocrinology, however This is a recurrent problem for the patient      Consultants/Shared Management Plan:    None    Social Determinants of Health:    Patient is independent, reliable, and has access to care.       Disposition and Care Coordination:    Discharged: The patient is suitable and stable for discharge with no need for consideration of observation or admission.    I have explained the patient´s condition, diagnoses and treatment plan based on the information available to me at this time. I have answered questions and addressed any concerns. The patient has a good  understanding of the patient´s diagnosis, condition, and treatment plan as can be expected at this point. The vital signs have been stable. The patient´s condition is stable and appropriate for discharge from the emergency department.      The patient will pursue further outpatient evaluation with the primary care physician or other designated or consulting physician as outlined in the discharge instructions. They are agreeable to this plan of care and follow-up instructions have been explained in detail. The patient has received these instructions in written format and have expressed an  understanding of the discharge instructions. The patient is aware that any significant change in condition or worsening of symptoms should prompt an immediate return to this or the closest emergency department or call to 911.  I have explained discharge medications and the need for follow up with the patient/caretakers. This was also printed in the discharge instructions. Patient was discharged with the following medications and follow up:      Medication List      No changes were made to your prescriptions during this visit.      No follow-up provider specified.     Final diagnoses:   Hypocalcemia        ED Disposition     ED Disposition   Discharge    Condition   Stable    Comment   --           Documentation assistance provided by Lennox Patel acting as scribe for Kahlil Rodriguez MD. Information recorded by the scribe was done at my direction and has been verified and validated by me.       This medical record created using voice recognition software.           Lennox Patel  06/02/23 3980       Lennox Patel  06/02/23 2713       Kahlil Rodriguez MD  06/02/23 6037

## 2023-06-02 NOTE — Clinical Note
[facility] EMERGENCY ROOM  [address]  [place] [address]  Phone: [phone]    Patricia Milligan was seen and treated in our emergency department on 6/2/2023.  She may return to work on 06/05/2023.         Thank you for choosing [facility].    Kahlil Rodriguez MD

## 2023-06-03 LAB
CA-I BLDA-SCNC: 0.66 MMOL/L (ref 1.13–1.32)
QT INTERVAL: 472 MS

## 2023-07-04 PROBLEM — I31.39 PERICARDIAL EFFUSION: Status: ACTIVE | Noted: 2023-07-04

## 2023-07-25 ENCOUNTER — OFFICE VISIT (OUTPATIENT)
Dept: FAMILY MEDICINE CLINIC | Facility: CLINIC | Age: 43
End: 2023-07-25
Payer: MEDICAID

## 2023-07-25 VITALS
SYSTOLIC BLOOD PRESSURE: 110 MMHG | DIASTOLIC BLOOD PRESSURE: 80 MMHG | TEMPERATURE: 97.1 F | BODY MASS INDEX: 46.6 KG/M2 | HEART RATE: 90 BPM | HEIGHT: 63 IN | OXYGEN SATURATION: 96 % | WEIGHT: 263 LBS

## 2023-07-25 DIAGNOSIS — R06.2 WHEEZING: ICD-10-CM

## 2023-07-25 DIAGNOSIS — T82.898A EXTRAVASATION INJURY OF IV CATHETER SITE WITH OTHER COMPLICATION, INITIAL ENCOUNTER: ICD-10-CM

## 2023-07-25 DIAGNOSIS — E20.9 HYPOPARATHYROIDISM, UNSPECIFIED HYPOPARATHYROIDISM TYPE: ICD-10-CM

## 2023-07-25 DIAGNOSIS — E89.0 POSTSURGICAL HYPOTHYROIDISM: Primary | ICD-10-CM

## 2023-07-25 PROCEDURE — 99214 OFFICE O/P EST MOD 30 MIN: CPT

## 2023-07-25 PROCEDURE — 1160F RVW MEDS BY RX/DR IN RCRD: CPT

## 2023-07-25 PROCEDURE — 1159F MED LIST DOCD IN RCRD: CPT

## 2023-07-25 NOTE — PROGRESS NOTES
Chief Complaint  Chief Complaint   Patient presents with    Follow-up    Hypothyroidism       Subjective      Patricia Milligan presents to [facility]  History of Present Illness  Patient presents today for 2 week follow up.  She had previously been seen to establish care and for hospital follow-up.  At that time she had recently been discharged from [facility] where she was transferred from [facility] for pericardial effusion.  Pertinent medical history includes total thyroidectomy for goiter in July 2019.  She had been out of work and uninsured so she had not been taking her medications.  When she presented to [facility] she had shortness of breath and chest pain with exertion.  There was concern for pericardial effusion and patient was transferred for possible percutaneous drainage.  Echocardiogram revealed EF of 51 to 55%, normal LV diastolic function, suggestive of precardiac tamponade.  Cardiology had planned to repeat TTE and if effusion has resolved no further procedure needed.  If effusion remains present, pericardiocentesis will be performed.  TSH has been persistently elevated and during hospitalization had been greater than 80 with a T4 of less than 0.10.  She was restarted on levothyroxine 200 mcg daily at time of discharge and reports that she has been compliant with taking that.    Patient was referred over to [facility] cardiology and endocrinology for ongoing monitoring of hypothyroidism, hypoparathyroidism, pericardial effusion. She saw Dr. Coombs on 7/18 for repeat echocardiogram and effusion showed slight improvement. She will follow up with cardiology again in October to repeat ultrasound and continue monitoring.  Renal function is improving.    Patient had IV infiltration with extravasation of medication during hospitalization.  She was advised to use warm compresses, elevate her arm and was prescribed Keflex for soft tissue infection of  the area at the time of her last visit.  Bilateral arms continue to have edema and painful, hardened areas where medication infiltrated.  Patient was given IV calcium and potassium during her hospital stay and she is unsure which medication had infiltrated.  She denies any decreased sensation to her hands or fingers and has full range of motion of her upper extremities.    Objective     Medical History:  Past Medical History:   Diagnosis Date    ADHD (attention deficit hyperactivity disorder) 1985    Or  i was 5 or 6 when diagnosed    Allergic     Seasonal allergies, Latex allergy    Anxiety 2020    On 7.5 mg Buspirone    Disease of thyroid gland     Hyperthyroidism     Hypocalcemia     Hypothyroidism 2016’::    Renal insufficiency      Past Surgical History:   Procedure Laterality Date    ADENOIDECTOMY      BARIATRIC SURGERY  11/15/2018    495lbs had gastric bypass surgery    CARDIAC CATHETERIZATION  0    CARPAL TUNNEL RELEASE       SECTION      GASTRIC BYPASS      HYSTERECTOMY      THYROID SURGERY      TONSILLECTOMY      TUBAL ABDOMINAL LIGATION  2009      Social History     Tobacco Use    Smoking status: Every Day     Packs/day: 0.25     Years: 15.00     Pack years: 3.75     Types: Cigarettes     Start date: 1992    Smokeless tobacco: Never   Vaping Use    Vaping Use: Never used   Substance Use Topics    Alcohol use: Yes     Alcohol/week: 2.0 standard drinks     Types: 2 Glasses of wine per week     Comment: Dinner or while cooking  social drinker    Drug use: Never     Family History   Problem Relation Age of Onset    Thyroid disease Mother     Diabetes Father         Type 2    Hyperlipidemia Father     Diabetes Maternal Grandfather          in his early 30's from Diabetes    Cancer Maternal Grandmother     Diabetes Paternal Grandfather          at early age from diabetes    Diabetes Maternal Uncle         Type 1 , lost right leg  due to diabetes  "      Medications:  Prior to Admission medications    Medication Sig Start Date End Date Taking? Authorizing Provider   busPIRone (BUSPAR) 7.5 MG tablet Take 1 tablet by mouth 3 (Three) Times a Day. 7/11/23  Yes Rachael Flores APRN   calcitriol (ROCALTROL) 0.5 MCG capsule Take 1 capsule by mouth 2 (Two) Times a Day. 7/11/23  Yes Rachael Flores APRN   Calcium Carbonate 1250 MG/5ML Take 5 mL by mouth 2 (Two) Times a Day.   Yes Livier Walden MD   Cyanocobalamin ER 1000 MCG tablet controlled-release 1,000 mcg. 7/8/23  Yes Livier Walden MD   ergocalciferol (ERGOCALCIFEROL) 1.25 MG (64689 UT) capsule Take 1 capsule by mouth Every 7 (Seven) Days. 7/11/23  Yes Rachael Flores APRN   levothyroxine (SYNTHROID, LEVOTHROID) 200 MCG tablet Take 1 tablet by mouth Daily. 7/11/23  Yes Rachael Flores APRN   loratadine (CLARITIN) 10 MG tablet Take 1 tablet by mouth Daily.   Yes Livier Walden MD        Allergies:   Latex    Health Maintenance Due   Topic Date Due    COVID-19 Vaccine (1) Never done    Pneumococcal Vaccine 0-64 (1 - PCV) Never done    TDAP/TD VACCINES (1 - Tdap) Never done    ANNUAL PHYSICAL  Never done         Vital Signs:   /80   Pulse 90   Temp 97.1 °F (36.2 °C)   Ht 160 cm (63\")   Wt 119 kg (263 lb)   SpO2 96%   BMI 46.59 kg/m²     Wt Readings from Last 3 Encounters:   07/25/23 119 kg (263 lb)   07/11/23 127 kg (279 lb)   07/04/23 134 kg (296 lb 1.2 oz)     BP Readings from Last 3 Encounters:   07/25/23 110/80   07/11/23 108/70   07/04/23 104/64              Physical Exam  Vitals reviewed.   Constitutional:       Appearance: Normal appearance. She is well-developed. She is morbidly obese.   HENT:      Head: Normocephalic and atraumatic.   Eyes:      Conjunctiva/sclera: Conjunctivae normal.      Pupils: Pupils are equal, round, and reactive to light.   Cardiovascular:      Rate and Rhythm: Normal rate and regular rhythm.      Heart sounds: No " murmur heard.    No friction rub. No gallop.   Pulmonary:      Effort: Pulmonary effort is normal. No tachypnea or respiratory distress.      Breath sounds: Normal breath sounds. Examination of the right-lower field reveals wheezing. No wheezing or rhonchi.   Abdominal:      General: Bowel sounds are normal. There is no distension.      Palpations: Abdomen is soft.      Tenderness: There is no abdominal tenderness.   Skin:     General: Skin is warm and dry.      Findings: Signs of injury present. No erythema.      Comments: Edema and induration to right forearm from IV infiltration with extravasation of medication.   Neurological:      Mental Status: She is alert and oriented to person, place, and time.      Cranial Nerves: No cranial nerve deficit.   Psychiatric:         Mood and Affect: Mood and affect normal.         Behavior: Behavior normal.         Thought Content: Thought content normal.         Judgment: Judgment normal.        Result Review :    The following data was reviewed by HOA Ramirez on 07/25/23 at 14:18 EDT:    Common labs          7/4/2023    00:04 7/4/2023    14:15 7/11/2023    11:16 7/18/2023    12:21   Common Labs   Glucose 98  82  83     BUN 27  24  25     Creatinine 1.51  1.51  1.43     Sodium 142  144  139     Potassium 4.0  3.9  4.0     Chloride 100  98  95     Calcium 5.7  6.3  9.0     Albumin 4.2  4.7  4.5     Total Bilirubin 0.3   0.6     Alkaline Phosphatase 104   106     AST (SGOT) 33   23     ALT (SGPT) 25   13     WBC 6.90   8.55     Hemoglobin 11.3   11.8     Hematocrit 33.8   35.4     Platelets 145   204     Total Cholesterol   169     Total Cholesterol    131       Triglycerides   113  141       HDL Cholesterol   50  36       LDL Cholesterol    99        Details          This result is from an external source.               CT Chest With Contrast Diagnostic    Result Date: 7/4/2023    1. No acute pulmonary embolic disease. 2. Moderate-sized pericardial effusion.   The heart is enlarged. 3. Subsegmental atelectasis in both lungs as described.     CLAY RUSS MD       Electronically Signed and Approved By: CLAY RUSS MD on 7/04/2023 at 12:23             XR Chest 1 View    Result Date: 7/4/2023    Mild bilateral airspace disease is seen.  The findings may represent pulmonary edema.  Infectious multifocal pneumonia cannot be excluded.  Atelectasis alone is possible.  There is suspected mild cardiomegaly.      Please note that portions of this note were completed with a voice recognition program.  JACKELYN WESLEY JR, MD       Electronically Signed and Approved By: JACKELYN WESLEY JR, MD on 7/04/2023 at 1:09              XR Chest 1 View    Result Date: 4/23/2023   No acute cardiopulmonary abnormality.       OZZIE JOHNSON MD       Electronically Signed and Approved By: OZZIE JOHNSON MD on 4/23/2023 at 16:50                             Assessment and Plan    Diagnoses and all orders for this visit:    1. Postsurgical hypothyroidism (Primary)    2. Hypoparathyroidism, unspecified hypoparathyroidism type    3. Extravasation injury of IV catheter site with other complication, initial encounter  -     US Nonvascular Extremity Limited; Future    4. Wheezing  -     XR Chest PA & Lateral; Future    Patient will follow-up with me in 1 month.  At that time we will repeat labs and ensure that thyroid profile is improving with consistent use of levothyroxine and that calcium levels are maintaining.  Patient denies any shortness of air or dyspnea on exertion but does have some adventitious lung sounds to the right lower lobe.  Given previous chest x-ray and chest CT findings patient will have repeat chest x-ray to rule out any underlying pneumonia.  Patient to have ultrasound of bilateral arms for evaluation of tissue injury from IV infiltration and extravasation.                  Follow Up   Return in about 1 month (around 8/25/2023) for Next scheduled follow up.  Patient was given instructions  and counseling regarding her condition or for health maintenance advice. Please see specific information pulled into the AVS if appropriate.     Please note that portions of this note were completed with a voice recognition program.

## 2023-07-31 ENCOUNTER — TELEPHONE (OUTPATIENT)
Dept: FAMILY MEDICINE CLINIC | Facility: CLINIC | Age: 43
End: 2023-07-31

## 2023-07-31 NOTE — TELEPHONE ENCOUNTER
Caller: Milligan, Patricia    Relationship: Self    Best call back number: [phone]     Who are you requesting to speak with (clinical staff, provider,  specific staff member):     What was the call regarding: PATIENT WOULD LIKE TO KNOW THE STATUS OF THE ULTRASOUND REFERRAL. SHE HAS NOT RECEIVED A CALL TO SCHEDULE THE ULTRASOUND. PLEASE CALL PATIENT TO ADVISE.

## 2023-08-01 ENCOUNTER — TELEPHONE (OUTPATIENT)
Dept: FAMILY MEDICINE CLINIC | Facility: CLINIC | Age: 43
End: 2023-08-01

## 2023-08-01 RX ORDER — LEVOTHYROXINE SODIUM 0.2 MG/1
200 TABLET ORAL DAILY
Qty: 30 TABLET | Refills: 5 | Status: SHIPPED | OUTPATIENT
Start: 2023-08-01

## 2023-08-01 RX ORDER — BUSPIRONE HYDROCHLORIDE 7.5 MG/1
7.5 TABLET ORAL 3 TIMES DAILY
Qty: 90 TABLET | Refills: 2 | Status: SHIPPED | OUTPATIENT
Start: 2023-08-01

## 2023-08-01 RX ORDER — ERGOCALCIFEROL 1.25 MG/1
1 CAPSULE ORAL
Qty: 5 CAPSULE | Refills: 11 | Status: SHIPPED | OUTPATIENT
Start: 2023-08-01

## 2023-08-01 RX ORDER — CALCIUM CARBONATE 1250 MG/5ML
1250 SUSPENSION ORAL 2 TIMES DAILY
Qty: 450 ML | Refills: 2 | Status: SHIPPED | OUTPATIENT
Start: 2023-08-01

## 2023-08-01 RX ORDER — LORATADINE 10 MG/1
10 TABLET ORAL DAILY
Qty: 90 TABLET | Refills: 1 | Status: SHIPPED | OUTPATIENT
Start: 2023-08-01

## 2023-08-01 RX ORDER — CALCITRIOL 0.5 UG/1
0.5 CAPSULE, LIQUID FILLED ORAL 2 TIMES DAILY
Qty: 60 CAPSULE | Refills: 2 | Status: SHIPPED | OUTPATIENT
Start: 2023-08-01

## 2023-08-01 NOTE — TELEPHONE ENCOUNTER
Provider: STEPHEN MALAVE  Caller: DAVIDE  Relationship to Patient: SELF  Phone Number: [phone]  Reason for Call:   THE PATIENT STATED SHE RECEIVED A CALL FROM THE REFERRAL DEPARTMENT. SHE HAS AN APPOINTMENT WITH HER ENDOCRINOLOGIST 08/18/23 AT 9AM

## 2023-08-07 ENCOUNTER — OFFICE VISIT (OUTPATIENT)
Dept: FAMILY MEDICINE CLINIC | Facility: CLINIC | Age: 43
End: 2023-08-07
Payer: MEDICAID

## 2023-08-07 VITALS
HEIGHT: 63 IN | TEMPERATURE: 98.4 F | HEART RATE: 78 BPM | DIASTOLIC BLOOD PRESSURE: 86 MMHG | BODY MASS INDEX: 47.41 KG/M2 | OXYGEN SATURATION: 100 % | WEIGHT: 267.6 LBS | SYSTOLIC BLOOD PRESSURE: 128 MMHG

## 2023-08-07 DIAGNOSIS — R20.2 NUMBNESS AND TINGLING IN RIGHT HAND: ICD-10-CM

## 2023-08-07 DIAGNOSIS — R20.0 NUMBNESS AND TINGLING IN RIGHT HAND: ICD-10-CM

## 2023-08-07 DIAGNOSIS — T82.898D EXTRAVASATION INJURY OF INTRAVENOUS CATHETER SITE WITH OTHER COMPLICATION, SUBSEQUENT ENCOUNTER: Primary | ICD-10-CM

## 2023-08-07 PROCEDURE — 99214 OFFICE O/P EST MOD 30 MIN: CPT

## 2023-08-07 NOTE — PROGRESS NOTES
Chief Complaint  Chief Complaint   Patient presents with    Arm Pain     Pain in RT arm after calcium infusion, PT is losing feeling in arm, tingling up arm, PT has US scheduled for 8/14/23          Subjective      Patricia Milligan presents to [facility]  Arm Pain     Patient presents today with ongoing complaints of right arm pain and tingling following IV infiltration and extravasation.  Following hospitalization from 7/4/2023 through 7/8/2023 at [facility] patient began to notice redness and pain to her right arm where she had IVs and received medications, calcium and possibly other IV medications. She stated she had been given IV calcium and potassium during her hospital stay and is unsure which medication had infiltrated. Patient states that IV was known to be infiltrated in the hospital, IV catheter was removed and warm compresses applied to the area.  At time of hospital follow-up on 7/11/2023 patient was prescribed oral Keflex.  At 2-week follow-up visit patient continued to complain of pain and swelling to the area.  An ultrasound of the arm was ordered and is scheduled for 8/14/2023. Patient now reports that she is feeling a decrease in sensation and tingling that radiates up her arm.  She currently rates her pain a 7 out of 10.    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 She has noticed that this causes an increase in arm pain and numbness with tingling sensation. She denies any decrease in strength.     Objective     Medical History:  Past Medical History:   Diagnosis Date    ADHD (attention deficit hyperactivity disorder) 01/01/1985    Or 1986 i was 5 or 6 when diagnosed    Allergic     Seasonal allergies, Latex allergy    Anxiety 07/01/2020    On 7.5 mg Buspirone    Disease of thyroid gland     Hyperthyroidism 01/01/216    Hypocalcemia     Hypothyroidism 2016'::    Renal  insufficiency      Past Surgical History:   Procedure Laterality Date    ADENOIDECTOMY      BARIATRIC SURGERY  11/15/2018    495lbs had gastric bypass surgery    CARDIAC CATHETERIZATION  0    CARPAL TUNNEL RELEASE       SECTION      GASTRIC BYPASS      HYSTERECTOMY      THYROID SURGERY      TONSILLECTOMY      TUBAL ABDOMINAL LIGATION  2009      Social History     Tobacco Use    Smoking status: Every Day     Packs/day: 0.25     Years: 15.00     Pack years: 3.75     Types: Cigarettes     Start date: 1992    Smokeless tobacco: Never   Vaping Use    Vaping Use: Never used   Substance Use Topics    Alcohol use: Yes     Alcohol/week: 2.0 standard drinks     Types: 2 Glasses of wine per week     Comment: Dinner or while cooking  social drinker    Drug use: Never     Family History   Problem Relation Age of Onset    Thyroid disease Mother     Diabetes Father         Type 2    Hyperlipidemia Father     Diabetes Maternal Grandfather          in his early 30's from Diabetes    Cancer Maternal Grandmother     Diabetes Paternal Grandfather          at early age from diabetes    Diabetes Maternal Uncle         Type 1 , lost right leg  due to diabetes       Medications:  Prior to Admission medications    Medication Sig Start Date End Date Taking? Authorizing Provider   busPIRone (BUSPAR) 7.5 MG tablet Take 1 tablet by mouth 3 (Three) Times a Day. 23  Yes Rachael Flores APRN   calcitriol (ROCALTROL) 0.5 MCG capsule Take 1 capsule by mouth 2 (Two) Times a Day. 23  Yes Rachael Flores APRN   Calcium Carbonate 1250 MG/5ML Take 5 mL by mouth 2 (Two) Times a Day. 23  Yes Rachael Flores APRN   Cyanocobalamin ER 1000 MCG tablet controlled-release Take 1,000 mcg by mouth Daily. 23  Yes Rachael Flores APRN   ergocalciferol (ERGOCALCIFEROL) 1.25 MG (16685 UT) capsule Take 1 capsule by mouth Every 7 (Seven) Days. 23  Yes Rachael Flores APRN  "  levothyroxine (SYNTHROID, LEVOTHROID) 200 MCG tablet Take 1 tablet by mouth Daily. 8/1/23  Yes Rachael Flores APRN   loratadine (CLARITIN) 10 MG tablet Take 1 tablet by mouth Daily. 8/1/23  Yes Rachael Flores APRN        Allergies:   Latex    Health Maintenance Due   Topic Date Due    Pneumococcal Vaccine 0-64 (1 - PCV) Never done    TDAP/TD VACCINES (1 - Tdap) Never done    ANNUAL PHYSICAL  Never done         Vital Signs:   /86   Pulse 78   Temp 98.4 øF (36.9 øC)   Ht 160 cm (63\")   Wt 121 kg (267 lb 9.6 oz)   SpO2 100%   BMI 47.40 kg/mý     Wt Readings from Last 3 Encounters:   08/07/23 121 kg (267 lb 9.6 oz)   07/25/23 119 kg (263 lb)   07/11/23 127 kg (279 lb)     BP Readings from Last 3 Encounters:   08/07/23 128/86   07/25/23 110/80   07/11/23 108/70              Physical Exam  Vitals reviewed.   Constitutional:       Appearance: Normal appearance. She is well-developed. She is morbidly obese.   HENT:      Head: Normocephalic and atraumatic.   Eyes:      Conjunctiva/sclera: Conjunctivae normal.      Pupils: Pupils are equal, round, and reactive to light.   Cardiovascular:      Rate and Rhythm: Normal rate and regular rhythm.      Heart sounds: No murmur heard.    No friction rub. No gallop.   Pulmonary:      Effort: Pulmonary effort is normal. No tachypnea or respiratory distress.      Breath sounds: Normal breath sounds. No wheezing or rhonchi.   Abdominal:      General: Bowel sounds are normal. There is no distension.      Palpations: Abdomen is soft.      Tenderness: There is no abdominal tenderness.   Skin:     General: Skin is warm and dry.      Findings: Signs of injury present. No erythema.      Comments: Edema and induration to right forearm from IV infiltration with extravasation of medication.  No decrease in capillary refill.   Neurological:      Mental Status: She is alert and oriented to person, place, and time.      Cranial Nerves: No cranial nerve deficit.      " Sensory: Sensation is intact.      Motor: Weakness present.   Psychiatric:         Mood and Affect: Mood and affect normal.         Behavior: Behavior normal.         Thought Content: Thought content normal.         Judgment: Judgment normal.        Result Review :    The following data was reviewed by HOA Ramirez on 08/07/23 at 12:17 EDT:                             Assessment and Plan    Diagnoses and all orders for this visit:    1. Extravasation injury of intravenous catheter site with other complication, subsequent encounter (Primary)    2. Numbness and tingling in right hand    Ultrasound of arm able to be rescheduled for tomorrow to ensure that there are no clots and arm is neurovascularly intact.  Patient was encouraged to continue use of warm compresses.  Updated wound images to be found in chart comparing initial observation on 7/11/2023 to today.                  Follow Up   No follow-ups on file.  Patient was given instructions and counseling regarding her condition or for health maintenance advice. Please see specific information pulled into the AVS if appropriate.     Please note that portions of this note were completed with a voice recognition program.

## 2023-08-08 ENCOUNTER — HOSPITAL ENCOUNTER (OUTPATIENT)
Dept: CARDIOLOGY | Facility: HOSPITAL | Age: 43
Discharge: HOME | End: 2023-08-08
Payer: MEDICAID

## 2023-08-08 ENCOUNTER — HOSPITAL ENCOUNTER (OUTPATIENT)
Dept: ULTRASOUND IMAGING | Facility: HOSPITAL | Age: 43
Discharge: HOME | End: 2023-08-08
Payer: MEDICAID

## 2023-08-08 ENCOUNTER — HOSPITAL ENCOUNTER (OUTPATIENT)
Dept: GENERAL RADIOLOGY | Facility: HOSPITAL | Age: 43
Discharge: HOME | End: 2023-08-08
Payer: MEDICAID

## 2023-08-08 DIAGNOSIS — R20.0 NUMBNESS AND TINGLING IN RIGHT HAND: ICD-10-CM

## 2023-08-08 DIAGNOSIS — T82.898D EXTRAVASATION INJURY OF INTRAVENOUS CATHETER SITE WITH OTHER COMPLICATION, SUBSEQUENT ENCOUNTER: ICD-10-CM

## 2023-08-08 DIAGNOSIS — R20.2 NUMBNESS AND TINGLING IN RIGHT HAND: ICD-10-CM

## 2023-08-08 DIAGNOSIS — T82.898A EXTRAVASATION INJURY OF IV CATHETER SITE WITH OTHER COMPLICATION, INITIAL ENCOUNTER: ICD-10-CM

## 2023-08-08 DIAGNOSIS — T82.898D EXTRAVASATION INJURY OF INTRAVENOUS CATHETER SITE WITH OTHER COMPLICATION, SUBSEQUENT ENCOUNTER: Primary | ICD-10-CM

## 2023-08-08 DIAGNOSIS — R06.2 WHEEZING: ICD-10-CM

## 2023-08-08 LAB
BH CV UPPER VENOUS LEFT AXILLARY AUGMENT: NORMAL
BH CV UPPER VENOUS LEFT AXILLARY COMPRESS: NORMAL
BH CV UPPER VENOUS LEFT AXILLARY PHASIC: NORMAL
BH CV UPPER VENOUS LEFT AXILLARY SPONT: NORMAL
BH CV UPPER VENOUS LEFT BASILIC FOREARM COMPRESS: NORMAL
BH CV UPPER VENOUS LEFT BASILIC UPPER COMPRESS: NORMAL
BH CV UPPER VENOUS LEFT BRACHIAL AUGMENT: NORMAL
BH CV UPPER VENOUS LEFT BRACHIAL COMPRESS: NORMAL
BH CV UPPER VENOUS LEFT BRACHIAL PHASIC: NORMAL
BH CV UPPER VENOUS LEFT BRACHIAL SPONT: NORMAL
BH CV UPPER VENOUS LEFT CEPHALIC FOREARM COMPRESS: NORMAL
BH CV UPPER VENOUS LEFT CEPHALIC UPPER COLOR: 1
BH CV UPPER VENOUS LEFT CEPHALIC UPPER COMPRESS: NORMAL
BH CV UPPER VENOUS LEFT INTERNAL JUGULAR AUGMENT: NORMAL
BH CV UPPER VENOUS LEFT INTERNAL JUGULAR COMPRESS: NORMAL
BH CV UPPER VENOUS LEFT INTERNAL JUGULAR PHASIC: NORMAL
BH CV UPPER VENOUS LEFT INTERNAL JUGULAR SPONT: NORMAL
BH CV UPPER VENOUS LEFT RADIAL AUGMENT: NORMAL
BH CV UPPER VENOUS LEFT RADIAL COMPRESS: NORMAL
BH CV UPPER VENOUS LEFT RADIAL PHASIC: NORMAL
BH CV UPPER VENOUS LEFT RADIAL SPONT: NORMAL
BH CV UPPER VENOUS LEFT SUBCLAVIAN AUGMENT: NORMAL
BH CV UPPER VENOUS LEFT SUBCLAVIAN COMPRESS: NORMAL
BH CV UPPER VENOUS LEFT SUBCLAVIAN PHASIC: NORMAL
BH CV UPPER VENOUS LEFT SUBCLAVIAN SPONT: NORMAL
BH CV UPPER VENOUS LEFT ULNAR AUGMENT: NORMAL
BH CV UPPER VENOUS LEFT ULNAR COMPRESS: NORMAL
BH CV UPPER VENOUS LEFT ULNAR PHASIC: NORMAL
BH CV UPPER VENOUS LEFT ULNAR SPONT: NORMAL
BH CV UPPER VENOUS RIGHT AXILLARY AUGMENT: NORMAL
BH CV UPPER VENOUS RIGHT AXILLARY COMPRESS: NORMAL
BH CV UPPER VENOUS RIGHT AXILLARY PHASIC: NORMAL
BH CV UPPER VENOUS RIGHT AXILLARY SPONT: NORMAL
BH CV UPPER VENOUS RIGHT BASILIC FOREARM COMPRESS: NORMAL
BH CV UPPER VENOUS RIGHT BASILIC UPPER COLOR: 1
BH CV UPPER VENOUS RIGHT BASILIC UPPER COMPRESS: NORMAL
BH CV UPPER VENOUS RIGHT BRACHIAL AUGMENT: NORMAL
BH CV UPPER VENOUS RIGHT BRACHIAL COMPRESS: NORMAL
BH CV UPPER VENOUS RIGHT BRACHIAL PHASIC: NORMAL
BH CV UPPER VENOUS RIGHT BRACHIAL SPONT: NORMAL
BH CV UPPER VENOUS RIGHT CEPHALIC FOREARM COLOR: 1
BH CV UPPER VENOUS RIGHT CEPHALIC FOREARM COMPRESS: NORMAL
BH CV UPPER VENOUS RIGHT CEPHALIC UPPER COLOR: 1
BH CV UPPER VENOUS RIGHT CEPHALIC UPPER COMPRESS: NORMAL
BH CV UPPER VENOUS RIGHT INTERNAL JUGULAR AUGMENT: NORMAL
BH CV UPPER VENOUS RIGHT INTERNAL JUGULAR COMPRESS: NORMAL
BH CV UPPER VENOUS RIGHT INTERNAL JUGULAR PHASIC: NORMAL
BH CV UPPER VENOUS RIGHT INTERNAL JUGULAR SPONT: NORMAL
BH CV UPPER VENOUS RIGHT RADIAL AUGMENT: NORMAL
BH CV UPPER VENOUS RIGHT RADIAL COMPRESS: NORMAL
BH CV UPPER VENOUS RIGHT RADIAL PHASIC: NORMAL
BH CV UPPER VENOUS RIGHT RADIAL SPONT: NORMAL
BH CV UPPER VENOUS RIGHT SUBCLAVIAN AUGMENT: NORMAL
BH CV UPPER VENOUS RIGHT SUBCLAVIAN COMPRESS: NORMAL
BH CV UPPER VENOUS RIGHT SUBCLAVIAN PHASIC: NORMAL
BH CV UPPER VENOUS RIGHT SUBCLAVIAN SPONT: NORMAL
BH CV UPPER VENOUS RIGHT ULNAR AUGMENT: NORMAL
BH CV UPPER VENOUS RIGHT ULNAR COMPRESS: NORMAL
BH CV UPPER VENOUS RIGHT ULNAR PHASIC: NORMAL
BH CV UPPER VENOUS RIGHT ULNAR SPONT: NORMAL

## 2023-08-08 PROCEDURE — 93970 EXTREMITY STUDY: CPT | Performed by: SURGERY

## 2023-08-08 PROCEDURE — 93970 EXTREMITY STUDY: CPT

## 2023-08-08 PROCEDURE — 71046 X-RAY EXAM CHEST 2 VIEWS: CPT

## 2023-08-09 DIAGNOSIS — I82.90 VENOUS THROMBOEMBOLISM: Primary | ICD-10-CM

## 2023-08-09 DIAGNOSIS — I80.8 THROMBOPHLEBITIS ARM: ICD-10-CM

## 2023-08-14 DIAGNOSIS — I80.8 THROMBOPHLEBITIS ARM: Primary | ICD-10-CM

## 2023-09-05 ENCOUNTER — OFFICE VISIT (OUTPATIENT)
Dept: FAMILY MEDICINE CLINIC | Facility: CLINIC | Age: 43
End: 2023-09-05
Payer: MEDICAID

## 2023-09-05 VITALS
HEIGHT: 63 IN | BODY MASS INDEX: 44.86 KG/M2 | HEART RATE: 75 BPM | OXYGEN SATURATION: 99 % | SYSTOLIC BLOOD PRESSURE: 106 MMHG | DIASTOLIC BLOOD PRESSURE: 62 MMHG | TEMPERATURE: 98.5 F | WEIGHT: 253.2 LBS

## 2023-09-05 DIAGNOSIS — Z23 NEED FOR PNEUMOCOCCAL VACCINE: ICD-10-CM

## 2023-09-05 DIAGNOSIS — Z23 FLU VACCINE NEED: ICD-10-CM

## 2023-09-05 DIAGNOSIS — E66.01 CLASS 3 SEVERE OBESITY DUE TO EXCESS CALORIES WITH SERIOUS COMORBIDITY AND BODY MASS INDEX (BMI) OF 40.0 TO 44.9 IN ADULT: ICD-10-CM

## 2023-09-05 DIAGNOSIS — Z00.00 ANNUAL PHYSICAL EXAM: Primary | ICD-10-CM

## 2023-09-05 DIAGNOSIS — I80.8 THROMBOPHLEBITIS ARM: ICD-10-CM

## 2023-09-05 DIAGNOSIS — T82.898D EXTRAVASATION INJURY OF INTRAVENOUS CATHETER SITE WITH OTHER COMPLICATION, SUBSEQUENT ENCOUNTER: ICD-10-CM

## 2023-09-05 DIAGNOSIS — E89.0 POSTSURGICAL HYPOTHYROIDISM: ICD-10-CM

## 2023-09-05 DIAGNOSIS — I82.90 VENOUS THROMBOEMBOLISM: ICD-10-CM

## 2023-09-05 DIAGNOSIS — Z23 NEED FOR TDAP VACCINATION: ICD-10-CM

## 2023-09-05 DIAGNOSIS — Z12.31 ENCOUNTER FOR SCREENING MAMMOGRAM FOR MALIGNANT NEOPLASM OF BREAST: ICD-10-CM

## 2023-09-05 DIAGNOSIS — E20.9 HYPOPARATHYROIDISM, UNSPECIFIED HYPOPARATHYROIDISM TYPE: ICD-10-CM

## 2023-09-05 NOTE — PROGRESS NOTES
Chief Complaint  Chief Complaint   Patient presents with    hypoparathyroidism    postsurgical hypothyroidism    extravastion injury      Right arm       Subjective      Patricia Milligan presents to [facility]  The patient presents today for a 1-month follow-up.     She was seen by endocrinology since her last visit and reports that her blood work is doing well and trending in the right direction. Her thyroid and calcium levels are still low but have improved. She will follow up with endocrinology on 09/22/2023 and believes that her appointment with cardiology is in 10/2023. She will be having an ultrasound and if the results show no issues, there will be no further long-term follow-ups. Since her medications were adjusted, she has had no issues and has improved. She denies any dyspnea or chest pains.     She advised that an ultrasound of her right upper extremity was performed since her last visit that showed superficial venous thrombosis.  She has thrombi at the same location in both upper extremities. She states that she has not received a call from her referral to vascular surgery to schedule a consultation for the venous issues with numbness and the sensation of extreme heat. She is unable to  and turn objects such as a doorknob with her right hand. She had carpal tunnel surgery and does not believe this is associated with carpal tunnel. 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 She confirms that she is taking Eliquis as prescribed.     She is currently taking calcium, vitamin D, Synthroid, Eliquis, Claritin as needed for allergies, and BuSpar as needed for anxiety. She has decreased her weight from 267 pounds to 253 pounds. Her appetite has changed, and she has 4 to 5 ounces of food at a time throughout the day. She drinks 1 large, iced coffee throughout the day from [employer].      She has never had a mammogram and needs to schedule that. She needs to have a Tdap, influenza, and pneumonia vaccine.     Objective     Medical History:  Past Medical History:   Diagnosis Date    ADHD (attention deficit hyperactivity disorder) 1985    Or  i was 5 or 6 when diagnosed    Allergic     Seasonal allergies, Latex allergy    Anxiety 2020    On 7.5 mg Buspirone    Disease of thyroid gland     Hyperthyroidism     Hypocalcemia     Hypothyroidism 2016’::    Renal insufficiency      Past Surgical History:   Procedure Laterality Date    ADENOIDECTOMY      BARIATRIC SURGERY  11/15/2018    495lbs had gastric bypass surgery    CARDIAC CATHETERIZATION  0    CARPAL TUNNEL RELEASE       SECTION      GASTRIC BYPASS      HYSTERECTOMY      THYROID SURGERY      TONSILLECTOMY      TUBAL ABDOMINAL LIGATION  2009      Social History     Tobacco Use    Smoking status: Every Day     Packs/day: 0.25     Years: 15.00     Pack years: 3.75     Types: Cigarettes     Start date: 1992    Smokeless tobacco: Never   Vaping Use    Vaping Use: Never used   Substance Use Topics    Alcohol use: Yes     Alcohol/week: 2.0 standard drinks     Types: 2 Glasses of wine per week     Comment: Dinner or while cooking  social drinker    Drug use: Never     Family History   Problem Relation Age of Onset    Thyroid disease Mother     Diabetes Father         Type 2    Hyperlipidemia Father     Diabetes Maternal Grandfather          in his early 30's from Diabetes    Cancer Maternal Grandmother     Diabetes Paternal Grandfather          at early age from diabetes    Diabetes Maternal Uncle         Type 1 , lost right leg  due to diabetes       Medications:  Prior to Admission medications    Medication Sig Start Date End Date Taking? Authorizing Provider   apixaban (ELIQUIS) 5 MG tablet tablet Take 2 tablets twice daily for 7 days, then take 1 tablet twice daily. 23   Rachael Flores,  "HOA   busPIRone (BUSPAR) 7.5 MG tablet Take 1 tablet by mouth 3 (Three) Times a Day. 8/1/23   Rachael Flores APRN   calcitriol (ROCALTROL) 0.5 MCG capsule Take 1 capsule by mouth 2 (Two) Times a Day. 8/1/23   Rachael Flores APRN   Calcium Carbonate 1250 MG/5ML Take 5 mL by mouth 2 (Two) Times a Day. 8/1/23   Rachael Flores APRN   Cyanocobalamin ER 1000 MCG tablet controlled-release Take 1,000 mcg by mouth Daily. 8/1/23   Rachael Flores APRN   ergocalciferol (ERGOCALCIFEROL) 1.25 MG (54278 UT) capsule Take 1 capsule by mouth Every 7 (Seven) Days. 8/1/23   Rachael Flores APRN   levothyroxine (SYNTHROID, LEVOTHROID) 200 MCG tablet Take 1 tablet by mouth Daily. 8/1/23   Rachael Flores APRN   loratadine (CLARITIN) 10 MG tablet Take 1 tablet by mouth Daily. 8/1/23   Rachael Flores APRN        Allergies:   Latex    There are no preventive care reminders to display for this patient.        Vital Signs:   /62 (BP Location: Left arm, Patient Position: Sitting, Cuff Size: Adult)   Pulse 75   Temp 98.5 °F (36.9 °C) (Oral)   Ht 160 cm (63\")   Wt 115 kg (253 lb 3.2 oz)   SpO2 99%   BMI 44.85 kg/m²     Wt Readings from Last 3 Encounters:   09/05/23 115 kg (253 lb 3.2 oz)   08/07/23 121 kg (267 lb 9.6 oz)   07/25/23 119 kg (263 lb)     BP Readings from Last 3 Encounters:   09/05/23 106/62   08/07/23 128/86   07/25/23 110/80       Class 3 Severe Obesity (BMI >=40). Obesity-related health conditions include the following: dyslipidemias. Obesity is improving with lifestyle modifications. BMI is is above average; BMI management plan is completed. We discussed portion control and increasing exercise.       Physical Exam  Vitals reviewed.   Constitutional:       Appearance: Normal appearance. She is well-developed. She is morbidly obese.   HENT:      Head: Normocephalic and atraumatic.   Eyes:      Conjunctiva/sclera: Conjunctivae normal.      Pupils: Pupils are " equal, round, and reactive to light.   Cardiovascular:      Rate and Rhythm: Normal rate and regular rhythm.      Heart sounds: No murmur heard.    No friction rub. No gallop.   Pulmonary:      Effort: Pulmonary effort is normal. No tachypnea or respiratory distress.      Breath sounds: Normal breath sounds. No wheezing or rhonchi.   Abdominal:      General: Bowel sounds are normal. There is no distension.      Palpations: Abdomen is soft.      Tenderness: There is no abdominal tenderness.   Skin:     General: Skin is warm and dry.      Findings: Signs of injury present. No erythema.      Comments: Edema and induration to right forearm from IV infiltration with extravasation of medication.  No decrease in capillary refill.   Neurological:      Mental Status: She is alert and oriented to person, place, and time.      Cranial Nerves: No cranial nerve deficit.      Sensory: Sensation is intact.      Motor: Weakness present.   Psychiatric:         Mood and Affect: Mood and affect normal.         Behavior: Behavior normal.         Thought Content: Thought content normal.         Judgment: Judgment normal.       Result Review :    The following data was reviewed by HOA Ramirez on 09/05/23 at 16:28 EDT:    Common labs          7/4/2023    00:04 7/4/2023    14:15 7/11/2023    11:16 7/18/2023    12:21   Common Labs   Glucose 98  82  83     BUN 27  24  25     Creatinine 1.51  1.51  1.43     Sodium 142  144  139     Potassium 4.0  3.9  4.0     Chloride 100  98  95     Calcium 5.7  6.3  9.0     Albumin 4.2  4.7  4.5     Total Bilirubin 0.3   0.6     Alkaline Phosphatase 104   106     AST (SGOT) 33   23     ALT (SGPT) 25   13     WBC 6.90   8.55     Hemoglobin 11.3   11.8     Hematocrit 33.8   35.4     Platelets 145   204     Total Cholesterol   169     Total Cholesterol    131       Triglycerides   113  141       HDL Cholesterol   50  36       LDL Cholesterol    99        Details          This result is from an  external source.               CT Chest With Contrast Diagnostic    Result Date: 7/4/2023    1. No acute pulmonary embolic disease. 2. Moderate-sized pericardial effusion.  The heart is enlarged. 3. Subsegmental atelectasis in both lungs as described.     CLAY RUSS MD       Electronically Signed and Approved By: CLAY RUSS MD on 7/04/2023 at 12:23             XR Chest 1 View    Result Date: 7/4/2023    Mild bilateral airspace disease is seen.  The findings may represent pulmonary edema.  Infectious multifocal pneumonia cannot be excluded.  Atelectasis alone is possible.  There is suspected mild cardiomegaly.      Please note that portions of this note were completed with a voice recognition program.  JACKELYN WESLEY JR, MD       Electronically Signed and Approved By: JACKELYN WESLEY JR, MD on 7/04/2023 at 1:09              XR Chest PA & Lateral    Result Date: 8/8/2023   No acute cardiopulmonary process.     TERRY BENEDICT MD       Electronically Signed and Approved By: TERRY BENEDICT MD on 8/08/2023 at 15:40                        Assessment and Plan    Diagnoses and all orders for this visit:    1. Annual physical exam (Primary)    2. Extravasation injury of intravenous catheter site with other complication, subsequent encounter    3. Thrombophlebitis arm  -     apixaban (ELIQUIS) 5 MG tablet tablet; Take 1 tablet by mouth Every 12 (Twelve) Hours.  Dispense: 60 tablet; Refill: 1    4. Postsurgical hypothyroidism    5. Hypoparathyroidism, unspecified hypoparathyroidism type    6. Class 3 severe obesity due to excess calories with serious comorbidity and body mass index (BMI) of 40.0 to 44.9 in adult    7. Encounter for screening mammogram for malignant neoplasm of breast  -     Mammo Screening Digital Tomosynthesis Bilateral With CAD; Future    8. Need for Tdap vaccination  -     Tdap Vaccine => 8yo IM (BOOSTRIX)    9. Need for pneumococcal vaccine  -     Pneumococcal Conjugate Vaccine 20-Valent (PCV20)    10.  Flu vaccine need  -     Fluzone (or Fluarix & Flulaval for VFC) >6 Mos (5971-5721)    11. Venous thromboembolism  -     apixaban (ELIQUIS) 5 MG tablet tablet; Take 1 tablet by mouth Every 12 (Twelve) Hours.  Dispense: 60 tablet; Refill: 1       IV infiltration.  Her previous referral to vascular surgery is still active from her previous visit, 08/14/2023. She was given the number to call to schedule an appointment. Continue taking Eliquis for 2 more months. She was educated on the risks of taking a blood thinner regarding injuries and lack of clotting.     Vitamin D deficiency.  She was advised that 1-year of refills was sent to her pharmacy 1 month ago. She will check with the pharmacy.     Hypothyroidism.  Continue to follow up with endocrinology.     Cardiac.  Continue to follow up with cardiology.     Preventative care.  A mammogram will be ordered. She will also have the Tdap, influenza, and pneumonia vaccines today. Her last glucose and cholesterol levels were normal; her last hemoglobin A1c was 5.8 percent.         Smoking Cessation:    Patricia Milligan  reports that she has been smoking cigarettes. She started smoking about 31 years ago. She has a 3.75 pack-year smoking history. She has never used smokeless tobacco.. I have educated her on the risk of diseases from using tobacco products such as cancer, COPD, and heart disease.     I advised her to quit and she is not willing to quit.    I spent 3  minutes counseling the patient.            Follow Up   No follow-ups on file.  Patient was given instructions and counseling regarding her condition or for health maintenance advice. Please see specific information pulled into the AVS if appropriate.     Please note that portions of this note were completed with a voice recognition program.    Transcribed from ambient dictation for HOA Ramirez by Malka Ellison.  09/05/23   14:30 EDT    Patient or patient representative verbalized consent to the  visit recording.  I have personally performed the services described in this document as transcribed by the above individual, and it is both accurate and complete.

## 2023-09-14 ENCOUNTER — OFFICE VISIT (OUTPATIENT)
Dept: VASCULAR SURGERY | Facility: HOSPITAL | Age: 43
End: 2023-09-14
Payer: MEDICAID

## 2023-09-14 VITALS
OXYGEN SATURATION: 97 % | HEART RATE: 75 BPM | RESPIRATION RATE: 18 BRPM | DIASTOLIC BLOOD PRESSURE: 70 MMHG | TEMPERATURE: 97.2 F | SYSTOLIC BLOOD PRESSURE: 110 MMHG

## 2023-09-14 DIAGNOSIS — I80.8 THROMBOPHLEBITIS ARM: Primary | ICD-10-CM

## 2023-09-14 PROCEDURE — G0463 HOSPITAL OUTPT CLINIC VISIT: HCPCS | Performed by: SURGERY

## 2023-09-14 NOTE — PROGRESS NOTES
[facility]   HISTORY AND PHYSICAL    Patient Name: Patricia Milligan  : 1980  MRN: [id number]  Primary Care Physician:  Rachael Flores APRN      Subjective   Subjective     Chief Complaint: Right arm thrombophlebitis and pain    HPI:    Patricia Milligan is a 42 y.o. female who was admitted in the hospital about 3 months ago.  She had an IV in the right forearm.  She had 2 lines.  She tells me that the lines infiltrated and caused significant extravasation.  She had a cordlike structure in the right forearm on the medial side.  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  She also says fever feels weak in her arm off and on but this is not consistent.  She is worried about the cordlike structure.  She is also worried that one of the access point took 3 months to heal.  The patient has not had any prior DVT in herself or family.  She is a current smoker.  No known diabetes.    Review of Systems  As above    Personal History     Past Medical History:   Diagnosis Date    ADHD (attention deficit hyperactivity disorder) 1985    Or  i was 5 or 6 when diagnosed    Allergic     Seasonal allergies, Latex allergy    Anxiety 2020    On 7.5 mg Buspirone    Disease of thyroid gland     Hyperthyroidism     Hypocalcemia     Hypothyroidism ’::    Renal insufficiency        Past Surgical History:   Procedure Laterality Date    ADENOIDECTOMY      BARIATRIC SURGERY  11/15/2018    495lbs had gastric bypass surgery    CARDIAC CATHETERIZATION  0    CARPAL TUNNEL RELEASE       SECTION      GASTRIC BYPASS      HYSTERECTOMY      THYROID SURGERY      TONSILLECTOMY      TUBAL ABDOMINAL LIGATION  2009       Family History: family history includes Cancer in her maternal grandmother; Diabetes in her father, maternal grandfather, maternal uncle, and paternal grandfather; Hyperlipidemia in her father; Thyroid disease in her mother.  Otherwise pertinent FHx was reviewed and not pertinent to current issue.    Social History:  reports that she has been smoking cigarettes. She started smoking about 31 years ago. She has a 3.75 pack-year smoking history. She has never used smokeless tobacco. She reports current alcohol use of about 2.0 standard drinks per week. She reports that she does not use drugs.    Home Medications:  Current Outpatient Medications on File Prior to Visit   Medication Sig    apixaban (ELIQUIS) 5 MG tablet tablet Take 1 tablet by mouth Every 12 (Twelve) Hours.    busPIRone (BUSPAR) 7.5 MG tablet Take 1 tablet by mouth 3 (Three) Times a Day.    calcitriol (ROCALTROL) 0.5 MCG capsule Take 1 capsule by mouth 2 (Two) Times a Day.    Calcium Carbonate 1250 MG/5ML Take 5 mL by mouth 2 (Two) Times a Day.    Cyanocobalamin ER 1000 MCG tablet controlled-release Take 1,000 mcg by mouth Daily.    ergocalciferol (ERGOCALCIFEROL) 1.25 MG (81006 UT) capsule Take 1 capsule by mouth Every 7 (Seven) Days.    levothyroxine (SYNTHROID, LEVOTHROID) 200 MCG tablet Take 1 tablet by mouth Daily.    loratadine (CLARITIN) 10 MG tablet Take 1 tablet by mouth Daily.     No current facility-administered medications on file prior to visit.          Allergies:  Allergies   Allergen Reactions    Latex Rash and Itching       Objective   Objective     Vitals:   Temp:  [97.2 °F (36.2 °C)] 97.2 °F (36.2 °C)  Heart Rate:  [75] 75  Resp:  [18] 18  BP: (110)/(70) 110/70    Physical Exam   Patient is morbidly obese.  Appears stated age.  No acute distress.  Nonlabored breathing.  Right forearm has a cordlike structure on the medial side.  This is consistent with chronic phlebitis.  No cellulitis noted.  Palpable radial pulse.  Good  strength in the hand.  Hand is warm and well-perfused.  Scab on the dorsal side of the forearm in the midpoint from prior access supposedly.  This is healing     Result Review    Most notable findings include: None  available    Assessment & Plan   Assessment / Plan     Brief Patient Summary:  Patricia Milligan is a 42 y.o. female who has right forearm thrombophlebitis from IV infiltration.  I explained to her that this is a common issue.  There is no long-term consequences from this.  The cordlike structure should improve with time.  I would not recommend any intervention for this.  I cannot explain her intermittent weakness and pain because of this.  I think that healing is probably not optimal because of smoking.  I encouraged her to do some lifestyle changes.    Diagnoses and all orders for this visit:    1. Thrombophlebitis arm (Primary)         Plan:   Follow-up as needed.  No intervention needed from my standpoint.        Electronically signed by Nasim Amaya MD, 09/14/23, 9:07 AM EDT.

## 2023-11-13 ENCOUNTER — APPOINTMENT (OUTPATIENT)
Dept: CT IMAGING | Facility: HOSPITAL | Age: 43
End: 2023-11-13
Payer: MEDICAID

## 2023-11-13 ENCOUNTER — HOSPITAL ENCOUNTER (EMERGENCY)
Facility: HOSPITAL | Age: 43
Discharge: HOME | End: 2023-11-13
Attending: EMERGENCY MEDICINE | Admitting: EMERGENCY MEDICINE
Payer: MEDICAID

## 2023-11-13 VITALS
RESPIRATION RATE: 20 BRPM | BODY MASS INDEX: 43.91 KG/M2 | HEART RATE: 70 BPM | OXYGEN SATURATION: 93 % | WEIGHT: 247.8 LBS | HEIGHT: 63 IN | DIASTOLIC BLOOD PRESSURE: 58 MMHG | TEMPERATURE: 97.8 F | SYSTOLIC BLOOD PRESSURE: 92 MMHG

## 2023-11-13 DIAGNOSIS — R10.31 RIGHT LOWER QUADRANT ABDOMINAL PAIN: Primary | ICD-10-CM

## 2023-11-13 LAB
ALBUMIN SERPL-MCNC: 4.3 G/DL (ref 3.5–5.2)
ALBUMIN/GLOB SERPL: 1.2 G/DL
ALP SERPL-CCNC: 148 U/L (ref 39–117)
ALT SERPL W P-5'-P-CCNC: 11 U/L (ref 1–33)
ANION GAP SERPL CALCULATED.3IONS-SCNC: 13.5 MMOL/L (ref 5–15)
AST SERPL-CCNC: 18 U/L (ref 1–32)
BACTERIA UR QL AUTO: NORMAL /HPF
BASOPHILS # BLD AUTO: 0.05 10*3/MM3 (ref 0–0.2)
BASOPHILS NFR BLD AUTO: 0.6 % (ref 0–1.5)
BILIRUB SERPL-MCNC: 0.2 MG/DL (ref 0–1.2)
BILIRUB UR QL STRIP: NEGATIVE
BUN SERPL-MCNC: 19 MG/DL (ref 6–20)
BUN/CREAT SERPL: 20.9 (ref 7–25)
CALCIUM SPEC-SCNC: 7.1 MG/DL (ref 8.6–10.5)
CHLORIDE SERPL-SCNC: 99 MMOL/L (ref 98–107)
CLARITY UR: CLEAR
CO2 SERPL-SCNC: 26.5 MMOL/L (ref 22–29)
COLOR UR: YELLOW
CREAT SERPL-MCNC: 0.91 MG/DL (ref 0.57–1)
DEPRECATED RDW RBC AUTO: 47.1 FL (ref 37–54)
EGFRCR SERPLBLD CKD-EPI 2021: 80.9 ML/MIN/1.73
EOSINOPHIL # BLD AUTO: 0.37 10*3/MM3 (ref 0–0.4)
EOSINOPHIL NFR BLD AUTO: 4.3 % (ref 0.3–6.2)
ERYTHROCYTE [DISTWIDTH] IN BLOOD BY AUTOMATED COUNT: 17.5 % (ref 12.3–15.4)
GLOBULIN UR ELPH-MCNC: 3.6 GM/DL
GLUCOSE SERPL-MCNC: 95 MG/DL (ref 65–99)
GLUCOSE UR STRIP-MCNC: NEGATIVE MG/DL
HCG INTACT+B SERPL-ACNC: 4.09 MIU/ML
HCT VFR BLD AUTO: 39.6 % (ref 34–46.6)
HGB BLD-MCNC: 12.5 G/DL (ref 12–15.9)
HGB UR QL STRIP.AUTO: NEGATIVE
HOLD SPECIMEN: NORMAL
HOLD SPECIMEN: NORMAL
HYALINE CASTS UR QL AUTO: NORMAL /LPF
IMM GRANULOCYTES # BLD AUTO: 0.03 10*3/MM3 (ref 0–0.05)
IMM GRANULOCYTES NFR BLD AUTO: 0.3 % (ref 0–0.5)
KETONES UR QL STRIP: NEGATIVE
LEUKOCYTE ESTERASE UR QL STRIP.AUTO: ABNORMAL
LIPASE SERPL-CCNC: 64 U/L (ref 13–60)
LYMPHOCYTES # BLD AUTO: 2.28 10*3/MM3 (ref 0.7–3.1)
LYMPHOCYTES NFR BLD AUTO: 26.4 % (ref 19.6–45.3)
MCH RBC QN AUTO: 23.8 PG (ref 26.6–33)
MCHC RBC AUTO-ENTMCNC: 31.6 G/DL (ref 31.5–35.7)
MCV RBC AUTO: 75.3 FL (ref 79–97)
MONOCYTES # BLD AUTO: 0.51 10*3/MM3 (ref 0.1–0.9)
MONOCYTES NFR BLD AUTO: 5.9 % (ref 5–12)
NEUTROPHILS NFR BLD AUTO: 5.4 10*3/MM3 (ref 1.7–7)
NEUTROPHILS NFR BLD AUTO: 62.5 % (ref 42.7–76)
NITRITE UR QL STRIP: NEGATIVE
NRBC BLD AUTO-RTO: 0 /100 WBC (ref 0–0.2)
PH UR STRIP.AUTO: 5.5 [PH] (ref 5–8)
PLATELET # BLD AUTO: 311 10*3/MM3 (ref 140–450)
PMV BLD AUTO: 11.2 FL (ref 6–12)
POTASSIUM SERPL-SCNC: 4.2 MMOL/L (ref 3.5–5.2)
PROT SERPL-MCNC: 7.9 G/DL (ref 6–8.5)
PROT UR QL STRIP: NEGATIVE
RBC # BLD AUTO: 5.26 10*6/MM3 (ref 3.77–5.28)
RBC # UR STRIP: NORMAL /HPF
REF LAB TEST METHOD: NORMAL
SODIUM SERPL-SCNC: 139 MMOL/L (ref 136–145)
SP GR UR STRIP: 1.02 (ref 1–1.03)
SQUAMOUS #/AREA URNS HPF: NORMAL /HPF
UROBILINOGEN UR QL STRIP: ABNORMAL
WBC # UR STRIP: NORMAL /HPF
WBC NRBC COR # BLD: 8.64 10*3/MM3 (ref 3.4–10.8)
WHOLE BLOOD HOLD COAG: NORMAL
WHOLE BLOOD HOLD SPECIMEN: NORMAL

## 2023-11-13 PROCEDURE — 85025 COMPLETE CBC W/AUTO DIFF WBC: CPT

## 2023-11-13 PROCEDURE — 74177 CT ABD & PELVIS W/CONTRAST: CPT

## 2023-11-13 PROCEDURE — 25010000002 ONDANSETRON PER 1 MG

## 2023-11-13 PROCEDURE — 80053 COMPREHEN METABOLIC PANEL: CPT

## 2023-11-13 PROCEDURE — 84702 CHORIONIC GONADOTROPIN TEST: CPT

## 2023-11-13 PROCEDURE — 99285 EMERGENCY DEPT VISIT HI MDM: CPT

## 2023-11-13 PROCEDURE — 25510000001 IOPAMIDOL PER 1 ML: Performed by: EMERGENCY MEDICINE

## 2023-11-13 PROCEDURE — 81001 URINALYSIS AUTO W/SCOPE: CPT

## 2023-11-13 PROCEDURE — 96374 THER/PROPH/DIAG INJ IV PUSH: CPT

## 2023-11-13 PROCEDURE — 83690 ASSAY OF LIPASE: CPT

## 2023-11-13 RX ORDER — ONDANSETRON 2 MG/ML
4 INJECTION INTRAMUSCULAR; INTRAVENOUS ONCE
Status: COMPLETED | OUTPATIENT
Start: 2023-11-13 | End: 2023-11-13

## 2023-11-13 RX ORDER — SODIUM CHLORIDE 0.9 % (FLUSH) 0.9 %
10 SYRINGE (ML) INJECTION AS NEEDED
Status: DISCONTINUED | OUTPATIENT
Start: 2023-11-13 | End: 2023-11-14 | Stop reason: HOSPADM

## 2023-11-13 RX ADMIN — ONDANSETRON 4 MG: 2 INJECTION INTRAMUSCULAR; INTRAVENOUS at 21:05

## 2023-11-13 RX ADMIN — IOPAMIDOL 100 ML: 755 INJECTION, SOLUTION INTRAVENOUS at 22:21

## 2023-11-14 NOTE — ED PROVIDER NOTES
Time: 8:52 PM EST  Date of encounter:  2023  Independent Historian/Clinical History and Information was obtained by:   Patient    History is limited by: N/A    Chief Complaint   Patient presents with    Abdominal Pain         History of Present Illness:  Patient is a 42 y.o. year old female who presents to the emergency department for evaluation of right sided abdominal pain. Pt has had gastric bypass so she is unable to vomit but she is nauseated. Pain rated as a 4.  Patient reports the pain is worse with coughing or sneezing or when straining to use the bathroom.      Patient Care Team  Primary Care Provider: Rachael Flores APRN    Past Medical History:     Allergies   Allergen Reactions    Latex Rash and Itching     Past Medical History:   Diagnosis Date    ADHD (attention deficit hyperactivity disorder) 1985    Or  i was 5 or 6 when diagnosed    Allergic     Seasonal allergies, Latex allergy    Anxiety 2020    On 7.5 mg Buspirone    Disease of thyroid gland     Hyperthyroidism     Hypocalcemia     Hypothyroidism ’::    Renal insufficiency      Past Surgical History:   Procedure Laterality Date    ADENOIDECTOMY      BARIATRIC SURGERY  11/15/2018    495lbs had gastric bypass surgery    CARDIAC CATHETERIZATION  0    CARPAL TUNNEL RELEASE       SECTION      GASTRIC BYPASS      HYSTERECTOMY      THYROID SURGERY      TONSILLECTOMY      TUBAL ABDOMINAL LIGATION  2009     Family History   Problem Relation Age of Onset    Thyroid disease Mother     Diabetes Father         Type 2    Hyperlipidemia Father     Diabetes Maternal Grandfather          in his early 30's from Diabetes    Cancer Maternal Grandmother     Diabetes Paternal Grandfather          at early age from diabetes    Diabetes Maternal Uncle         Type 1 , lost right leg  due to diabetes       Home Medications:  Prior to Admission medications    Medication Sig Start Date End Date Taking?  Authorizing Provider   apixaban (ELIQUIS) 5 MG tablet tablet Take 1 tablet by mouth Every 12 (Twelve) Hours. 9/5/23   Rachael Flores APRN   busPIRone (BUSPAR) 7.5 MG tablet Take 1 tablet by mouth 3 (Three) Times a Day. 8/1/23   Rachael Flores APRN   calcitriol (ROCALTROL) 0.5 MCG capsule Take 1 capsule by mouth 2 (Two) Times a Day. 8/1/23   Rachael Flores APRN   Calcium Carbonate 1250 MG/5ML Take 5 mL by mouth 2 (Two) Times a Day. 8/1/23   Rachael Flores APRN   Cyanocobalamin ER 1000 MCG tablet controlled-release Take 1,000 mcg by mouth Daily. 8/1/23   Rachael Flores APRN   ergocalciferol (ERGOCALCIFEROL) 1.25 MG (20019 UT) capsule Take 1 capsule by mouth Every 7 (Seven) Days. 8/1/23   Rachael Flores APRN   levothyroxine (SYNTHROID, LEVOTHROID) 200 MCG tablet Take 1 tablet by mouth Daily. 8/1/23   Rachael Flores APRN   loratadine (CLARITIN) 10 MG tablet Take 1 tablet by mouth Daily. 8/1/23   Rachael Flores APRN        Social History:   Social History     Tobacco Use    Smoking status: Every Day     Packs/day: 0.25     Years: 15.00     Additional pack years: 0.00     Total pack years: 3.75     Types: Cigarettes     Start date: 1/1/1992    Smokeless tobacco: Never   Vaping Use    Vaping Use: Never used   Substance Use Topics    Alcohol use: Yes     Alcohol/week: 2.0 standard drinks of alcohol     Types: 2 Glasses of wine per week     Comment: Dinner or while cooking  social drinker    Drug use: Never         Review of Systems:  Review of Systems   Constitutional:  Negative for fever.   HENT:  Negative for sore throat.    Eyes: Negative.    Respiratory:  Negative for cough and shortness of breath.    Cardiovascular:  Negative for chest pain.   Gastrointestinal:  Positive for abdominal pain and nausea. Negative for abdominal distention, constipation, diarrhea and vomiting.   Genitourinary:  Negative for dysuria.   Musculoskeletal:  Negative for neck  "pain.   Skin:  Negative for rash.   Allergic/Immunologic: Negative.    Neurological:  Negative for weakness, numbness and headaches.   Hematological: Negative.    Psychiatric/Behavioral: Negative.     All other systems reviewed and are negative.       Physical Exam:  BP 92/58   Pulse 70   Temp 97.8 °F (36.6 °C) (Oral)   Resp 20   Ht 160 cm (63\")   Wt 112 kg (247 lb 12.8 oz)   SpO2 93%   BMI 43.90 kg/m²         Physical Exam  Vitals and nursing note reviewed.   Constitutional:       General: She is not in acute distress.     Appearance: Normal appearance. She is not toxic-appearing.   HENT:      Head: Normocephalic and atraumatic.      Jaw: There is normal jaw occlusion.   Eyes:      General: Lids are normal.      Extraocular Movements: Extraocular movements intact.      Conjunctiva/sclera: Conjunctivae normal.      Pupils: Pupils are equal, round, and reactive to light.   Cardiovascular:      Rate and Rhythm: Normal rate and regular rhythm.      Pulses: Normal pulses.      Heart sounds: Normal heart sounds.   Pulmonary:      Effort: Pulmonary effort is normal. No respiratory distress.      Breath sounds: Normal breath sounds. No wheezing or rhonchi.   Abdominal:      General: Abdomen is flat.      Palpations: Abdomen is soft.      Tenderness: There is abdominal tenderness in the right lower quadrant. There is no guarding or rebound.   Musculoskeletal:         General: Normal range of motion.      Cervical back: Normal range of motion and neck supple.      Right lower leg: No edema.      Left lower leg: No edema.   Skin:     General: Skin is warm and dry.   Neurological:      Mental Status: She is alert and oriented to person, place, and time. Mental status is at baseline.   Psychiatric:         Mood and Affect: Mood normal.            Procedures:  Procedures      Medical Decision Making:      Comorbidities that affect care:    History of gastric bypass, Thyroid Disease    External Notes reviewed:    Previous " Clinic Note: Urgent care visits from 9/17/2023 and 10/30/2020      The following orders were placed and all results were independently analyzed by me:  Orders Placed This Encounter   Procedures    CT Abdomen Pelvis With Contrast    [place] Draw    Comprehensive Metabolic Panel    Lipase    Urinalysis With Microscopic If Indicated (No Culture) - Urine, Clean Catch    CBC Auto Differential    Urinalysis, Microscopic Only - Urine, Clean Catch    hCG, Quantitative, Pregnancy    NPO Diet NPO Type: Strict NPO    Undress & Gown    Insert Peripheral IV    CBC & Differential    Green Top (Gel)    Lavender Top    Gold Top - SST    Light Blue Top       Medications Given in the Emergency Department:  Medications   sodium chloride 0.9 % flush 10 mL (has no administration in time range)   ondansetron (ZOFRAN) injection 4 mg (4 mg Intravenous Given 11/13/23 2105)   iopamidol (ISOVUE-370) 76 % injection 100 mL (100 mL Intravenous Given 11/13/23 2221)        ED Course:    The patient was initially evaluated in the triage area where orders were placed. The patient was later dispositioned by HOA Yi.      The patient was advised to stay for completion of workup which includes but is not limited to communication of labs and radiological results, reassessment and plan. The patient was advised that leaving prior to disposition by a provider could result in critical findings that are not communicated to the patient.     ED Course as of 11/13/23 2314 Mon Nov 13, 2023 2053   --- PROVIDER IN TRIAGE NOTE ---    Patient was seen and evaluated in triage by HOA [place].  Orders were written and the patient is currently awaiting disposition.   [MS]      ED Course User Index  [MS] Taylor Kamara APRN       Labs:    Lab Results (last 24 hours)       Procedure Component Value Units Date/Time    CBC & Differential [[id number]]  (Abnormal) Collected: 11/13/23 1933    Specimen: Blood Updated: 11/13/23 1945     Narrative:      The following orders were created for panel order CBC & Differential.  Procedure                               Abnormality         Status                     ---------                               -----------         ------                     CBC Auto Differential[[id number]]        Abnormal            Final result                 Please view results for these tests on the individual orders.    Comprehensive Metabolic Panel [[id number]]  (Abnormal) Collected: 11/13/23 1933    Specimen: Blood Updated: 11/13/23 2003     Glucose 95 mg/dL      BUN 19 mg/dL      Creatinine 0.91 mg/dL      Sodium 139 mmol/L      Potassium 4.2 mmol/L      Chloride 99 mmol/L      CO2 26.5 mmol/L      Calcium 7.1 mg/dL      Total Protein 7.9 g/dL      Albumin 4.3 g/dL      ALT (SGPT) 11 U/L      AST (SGOT) 18 U/L      Alkaline Phosphatase 148 U/L      Total Bilirubin 0.2 mg/dL      Globulin 3.6 gm/dL      A/G Ratio 1.2 g/dL      BUN/Creatinine Ratio 20.9     Anion Gap 13.5 mmol/L      eGFR 80.9 mL/min/1.73     Narrative:      GFR Normal >60  Chronic Kidney Disease <60  Kidney Failure <15      Lipase [[id number]]  (Abnormal) Collected: 11/13/23 1933    Specimen: Blood Updated: 11/13/23 2003     Lipase 64 U/L     Urinalysis With Microscopic If Indicated (No Culture) - Urine, Clean Catch [[id number]]  (Abnormal) Collected: 11/13/23 1933    Specimen: Urine, Clean Catch Updated: 11/13/23 1950     Color, UA Yellow     Appearance, UA Clear     pH, UA 5.5     Specific Gravity, UA 1.016     Glucose, UA Negative     Ketones, UA Negative     Bilirubin, UA Negative     Blood, UA Negative     Protein, UA Negative     Leuk Esterase, UA Trace     Nitrite, UA Negative     Urobilinogen, UA 1.0 E.U./dL    CBC Auto Differential [[id number]]  (Abnormal) Collected: 11/13/23 1933    Specimen: Blood Updated: 11/13/23 1945     WBC 8.64 10*3/mm3      RBC 5.26 10*6/mm3      Hemoglobin 12.5 g/dL      Hematocrit 39.6 %      MCV 75.3 fL      MCH 23.8 pg       MCHC 31.6 g/dL      RDW 17.5 %      RDW-SD 47.1 fl      MPV 11.2 fL      Platelets 311 10*3/mm3      Neutrophil % 62.5 %      Lymphocyte % 26.4 %      Monocyte % 5.9 %      Eosinophil % 4.3 %      Basophil % 0.6 %      Immature Grans % 0.3 %      Neutrophils, Absolute 5.40 10*3/mm3      Lymphocytes, Absolute 2.28 10*3/mm3      Monocytes, Absolute 0.51 10*3/mm3      Eosinophils, Absolute 0.37 10*3/mm3      Basophils, Absolute 0.05 10*3/mm3      Immature Grans, Absolute 0.03 10*3/mm3      nRBC 0.0 /100 WBC     Urinalysis, Microscopic Only - Urine, Clean Catch [[id number]] Collected: 11/13/23 1933    Specimen: Urine, Clean Catch Updated: 11/13/23 1951     RBC, UA 0-2 /HPF      WBC, UA 0-2 /HPF      Bacteria, UA None Seen /HPF      Squamous Epithelial Cells, UA 0-2 /HPF      Hyaline Casts, UA None Seen /LPF      Methodology Automated Microscopy    hCG, Quantitative, Pregnancy [[id number]] Collected: 11/13/23 1933    Specimen: Blood Updated: 11/13/23 2114     HCG Quantitative 4.09 mIU/mL     Narrative:      HCG Ranges by Gestational Age    Females - non-pregnant premenopausal   </= 1mIU/mL HCG  Females - postmenopausal               </= 7mIU/mL HCG    3 Weeks       5.4   -      72 mIU/mL  4 Weeks      10.2   -     708 mIU/mL  5 Weeks       217   -   8,245 mIU/mL  6 Weeks       152   -  32,177 mIU/mL  7 Weeks     4,059   - 153,767 mIU/mL  8 Weeks    31,366   - 149,094 mIU/mL  9 Weeks    59,109   - 135,901 mIU/mL  10 Weeks   44,186   - 170,409 mIU/mL  12 Weeks   27,107   - 201,615 mIU/mL  14 Weeks   24,302   -  93,646 mIU/mL  15 Weeks   12,540   -  69,747 mIU/mL  16 Weeks    8,904   -  55,332 mIU/mL  17 Weeks    8,240   -  51,793 mIU/mL  18 Weeks    9,649   -  55,271 mIU/mL               Imaging:    CT Abdomen Pelvis With Contrast    Result Date: 11/13/2023  PROCEDURE: CT ABDOMEN PELVIS W CONTRAST  COMPARISON: 1/18/2008.  INDICATIONS: right-sided abdominal pain  TECHNIQUE: After obtaining the patient's consent, 770 CT  images were created with non-ionic intravenous contrast material.  Oral contrast was also administered for the exam.  The oral contrast preparation is incomplete.  PROTOCOL:   Standard CT imaging protocol performed.    RADIATION:   Total DLP: 1,287.7 mGy*cm.   Automated exposure control was utilized to minimize radiation dose. CONTRAST: 100 mL Isovue 370 I.V.  FINDINGS:  The patient has undergone cholecystectomy and hysterectomy as well as sleeve gastrectomy.  No acute findings are identified.  No acute pancreatitis.  No choledocholithiasis.  No biliary ductal dilatation.  No mechanical bowel obstruction.  No pathologic bowel wall thickening.  No pneumoperitoneum or pneumatosis.  No portal or mesenteric venous gas.  The appendix is within normal limits, well seen on axial image 142 of series 201 and adjacent images.  No acute colitis or diverticulitis.  There is colonic diverticulosis.  No renal/ureteral stones or hydronephrosis is seen.  No obstructive uropathy is identified.  No acute pyelonephritis.  No ascites.  No aneurysmal dilatation of the aortoiliac arterial system.  No acute intraperitoneal or retroperitoneal hemorrhage.  No enlarged intra-abdominal or intrapelvic lymph nodes.  No adrenal mass.  No acute findings are seen with regard to the liver or spleen.  There is hepatomegaly.  No splenomegaly.  No acute fracture.  No aggressive osseous lesion.  No acute infiltrate is seen in the partially imaged lung bases.  The urinary bladder is underdistended, limiting its assessment.  No definite urinary bladder calculi.  There are incidental pelvic phleboliths.  There are postoperative changes of the anterior abdominal and pelvic wall.        No acute findings are seen in the abdomen or pelvis by enhanced CT examination.    Please note that portions of this note were completed with a voice recognition program.  JACKELYN WESLEY JR, MD       Electronically Signed and Approved By: JACKELYN WESLEY JR, MD on 11/13/2023  at 22:41                 Differential Diagnosis and Discussion:      Abdominal Pain: Based on the patient's signs and symptoms, I considered abdominal aortic aneurysm, small bowel obstruction, pancreatitis, acute cholecystitis, acute appendecitis, peptic ulcer disease, gastritis, colitis, endocrine disorders, irritable bowel syndrome and other differential diagnosis an etiology of the patient's abdominal pain.    All labs were reviewed and interpreted by me.  CT scan radiology impression was interpreted by me.    MDM     Amount and/or Complexity of Data Reviewed  Clinical lab tests: reviewed    The patient is resting comfortably and feels better, is alert and in no distress. Repeat examination is unremarkable and benign; in particular, there's no discomfort at McBurney's point and there is no pulsatile mass. The history, exam, diagnostic testing, and current condition does not suggest acute appendicitis, bowel obstruction, acute cholecystitis, bowel perforation, major gastrointestinal bleeding, severe diverticulitis, abdominal aortic aneurysm, mesenteric ischemia, volvulus, sepsis, or other significant pathology that warrants further testing, continued ED treatment, admission, for surgical evaluation at this point. The vital signs have been stable. The patient does not have uncontrollable pain, intractable vomiting, or other significant symptoms. The patient's condition is stable and appropriate for discharge from the emergency department.            Patient Care Considerations:    CONSULT: I considered consulting general surgery, however no appendicitis found on CT.      Consultants/Shared Management Plan:    None    Social Determinants of Health:    Patient is independent, reliable, and has access to care.       Disposition and Care Coordination:    Discharged: The patient is suitable and stable for discharge with no need for consideration of observation or admission.    I have explained the patient´s condition,  diagnoses and treatment plan based on the information available to me at this time. I have answered questions and addressed any concerns. The patient has a good  understanding of the patient´s diagnosis, condition, and treatment plan as can be expected at this point. The vital signs have been stable. The patient´s condition is stable and appropriate for discharge from the emergency department.      The patient will pursue further outpatient evaluation with the primary care physician or other designated or consulting physician as outlined in the discharge instructions. They are agreeable to this plan of care and follow-up instructions have been explained in detail. The patient has received these instructions in written format and have expressed an understanding of the discharge instructions. The patient is aware that any significant change in condition or worsening of symptoms should prompt an immediate return to this or the closest emergency department or call to 911.  I have explained discharge medications and the need for follow up with the patient/caretakers. This was also printed in the discharge instructions. Patient was discharged with the following medications and follow up:      Medication List      No changes were made to your prescriptions during this visit.      Rachael Flores, APRN  [address] [address]  [phone]    Call in 2 days         Final diagnoses:   Right lower quadrant abdominal pain        ED Disposition       ED Disposition   Discharge    Condition   Stable    Comment   --               This medical record created using voice recognition software.             Lilian Roth, HOA  11/13/23 9405

## 2023-11-14 NOTE — DISCHARGE INSTRUCTIONS
Make sure to follow-up with your primary care provider if your symptoms do not improve the next 2 to 3 days.  Be cautious with any heavy lifting, pushing, or pulling.  It is safe to resume activity as tolerated.  Please return to the ED for worsening symptoms or if he should develop fevers of 100.5 or higher.

## 2023-12-26 ENCOUNTER — HOSPITAL ENCOUNTER (OUTPATIENT)
Dept: MAMMOGRAPHY | Facility: HOSPITAL | Age: 43
Discharge: HOME | End: 2023-12-26
Payer: MEDICAID

## 2023-12-26 DIAGNOSIS — Z12.31 ENCOUNTER FOR SCREENING MAMMOGRAM FOR MALIGNANT NEOPLASM OF BREAST: ICD-10-CM

## 2023-12-26 PROCEDURE — 77067 SCR MAMMO BI INCL CAD: CPT

## 2023-12-26 PROCEDURE — 77063 BREAST TOMOSYNTHESIS BI: CPT

## (undated) DEVICE — GLOVE ORANGE PI 7   MSG9070

## (undated) DEVICE — YANKAUER,BULB TIP,W/O VENT,RIGID,STERILE: Brand: MEDLINE

## (undated) DEVICE — 3M™ TRANSPORE™ WHITE SURGICAL TAPE 1534-2, 2 INCH X 10 YARD (5CM X 9,1M), 6 ROLLS/CARTON 10 CARTONS/CASE: Brand: 3M™ TRANSPORE™

## (undated) DEVICE — GOWN,SIRUS,NON REINFRCD,LARGE,SET IN SL: Brand: MEDLINE

## (undated) DEVICE — TUBING, SUCTION, 1/4" X 20', STRAIGHT: Brand: MEDLINE INDUSTRIES, INC.

## (undated) DEVICE — BREAST HERNIA PACK: Brand: MEDLINE INDUSTRIES, INC.

## (undated) DEVICE — BLADE LARYNSCP SZ 3 ENH DIR INTUB GLIDESCOPE MCGRATH MAC

## (undated) DEVICE — 450 ML BOTTLE OF 0.05% CHLORHEXIDINE GLUCONATE IN 99.95% STERILE WATER FOR IRRIGATION, USP AND APPLICATOR.: Brand: IRRISEPT ANTIMICROBIAL WOUND LAVAGE

## (undated) DEVICE — APPLIER CLP L9.38IN M LIG TI DISP STR RNG HNDL LIGACLP

## (undated) DEVICE — SOLUTION IV 1000ML LAC RINGERS PH 6.5 INJ USP VIAFLX PLAS

## (undated) DEVICE — SHEARS ENDOSCP L9CM CRV HARM FOCS +

## (undated) DEVICE — MARKER,SKIN,WI/RULER AND LABELS: Brand: MEDLINE

## (undated) DEVICE — GAUZE,SPONGE,8"X4",12PLY,XRAY,STRL,LF: Brand: MEDLINE

## (undated) DEVICE — BLANKET WRM W29.9XL79.1IN UP BODY FORC AIR MISTRAL-AIR

## (undated) DEVICE — AGENT HEMSTAT W2XL4IN OXIDIZED REGENERATED CELOS ABSRB SFT

## (undated) DEVICE — CRADLE POS 3X5X24IN RASPBERRY ARM PRONE FOAM DISP